# Patient Record
Sex: FEMALE
[De-identification: names, ages, dates, MRNs, and addresses within clinical notes are randomized per-mention and may not be internally consistent; named-entity substitution may affect disease eponyms.]

---

## 2018-07-06 ENCOUNTER — HOSPITAL ENCOUNTER (INPATIENT)
Dept: HOSPITAL 42 - ED | Age: 58
LOS: 3 days | Discharge: HOME | DRG: 127 | End: 2018-07-09
Attending: INTERNAL MEDICINE | Admitting: FAMILY MEDICINE
Payer: MEDICAID

## 2018-07-06 VITALS — BODY MASS INDEX: 44.9 KG/M2

## 2018-07-06 DIAGNOSIS — I50.23: ICD-10-CM

## 2018-07-06 DIAGNOSIS — R13.10: ICD-10-CM

## 2018-07-06 DIAGNOSIS — Z86.73: ICD-10-CM

## 2018-07-06 DIAGNOSIS — E11.9: ICD-10-CM

## 2018-07-06 DIAGNOSIS — I11.0: Primary | ICD-10-CM

## 2018-07-06 DIAGNOSIS — I27.20: ICD-10-CM

## 2018-07-06 DIAGNOSIS — I42.0: ICD-10-CM

## 2018-07-06 DIAGNOSIS — E66.01: ICD-10-CM

## 2018-07-06 DIAGNOSIS — I08.3: ICD-10-CM

## 2018-07-06 DIAGNOSIS — E78.5: ICD-10-CM

## 2018-07-06 DIAGNOSIS — E03.9: ICD-10-CM

## 2018-07-06 DIAGNOSIS — Z87.891: ICD-10-CM

## 2018-07-06 DIAGNOSIS — Z79.82: ICD-10-CM

## 2018-07-06 LAB
ALBUMIN SERPL-MCNC: 4.6 G/DL
ALBUMIN/GLOB SERPL: 1.3 {RATIO}
ALT SERPL-CCNC: 27 U/L
APPEARANCE UR: CLEAR
APTT BLD: 31.1 SECONDS
AST SERPL-CCNC: 30 U/L
BACTERIA #/AREA URNS HPF: (no result) /[HPF]
BASOPHILS # BLD AUTO: 0.04 K/MM3
BASOPHILS NFR BLD: 0.3 %
BILIRUB UR-MCNC: NEGATIVE MG/DL
BNP SERPL-MCNC: 3940 PG/ML
BUN SERPL-MCNC: 15 MG/DL
CALCIUM SERPL-MCNC: 9.6 MG/DL
COLOR UR: YELLOW
D DIMER PPP FEU-MCNC: 1108 NG/ML
EOSINOPHIL # BLD: 0.1 10*3/UL
EOSINOPHIL NFR BLD: 0.5 %
ERYTHROCYTE [DISTWIDTH] IN BLOOD BY AUTOMATED COUNT: 13.9 %
GFR NON-AFRICAN AMERICAN: > 60
GLUCOSE UR STRIP-MCNC: NEGATIVE MG/DL
GRANULOCYTES # BLD: 10.5 10*3/UL
GRANULOCYTES NFR BLD: 78.4 %
HDLC SERPL-MCNC: 43 MG/DL
HGB BLD-MCNC: 15.2 G/DL
INR PPP: 1.06
LDLC SERPL-MCNC: 113 MG/DL
LEUKOCYTE ESTERASE UR-ACNC: NEGATIVE LEU/UL
LIPASE SERPL-CCNC: 60 U/L
LYMPHOCYTES # BLD: 2 10*3/UL
LYMPHOCYTES NFR BLD AUTO: 14.7 %
MCH RBC QN AUTO: 30.2 PG
MCHC RBC AUTO-ENTMCNC: 33.3 G/DL
MCV RBC AUTO: 90.7 FL
MONOCYTES # BLD AUTO: 0.8 10*3/UL
MONOCYTES NFR BLD: 6.1 %
PH UR STRIP: 6.5 [PH]
PLATELET # BLD: 249 10^3/UL
PMV BLD AUTO: 12.2 FL
PROT UR STRIP-MCNC: 100 MG/DL
PROTHROMBIN TIME: 12.1 SECONDS
RBC # BLD AUTO: 5.04 10^6/UL
RBC # UR STRIP: (no result) /UL
SP GR UR STRIP: 1.02
T3 SERPL-MCNC: 1.12 NG/ML
T4 FREE SERPL-MCNC: 1.21 NG/DL
TROPONIN I SERPL-MCNC: 0.03 NG/ML
UROBILINOGEN UR STRIP-ACNC: 0.2 E.U./DL
WBC # BLD AUTO: 13.4 10^3/UL
WBC #/AREA URNS HPF: (no result) /HPF

## 2018-07-06 RX ADMIN — INSULIN HUMAN SCH: 100 INJECTION, SOLUTION PARENTERAL at 22:00

## 2018-07-06 NOTE — RAD
HISTORY:

sob  



COMPARISON:

No prior. 



FINDINGS:



LUNGS:

Technically limited due to patient body habitus. No infiltrate.



PLEURA:

No significant pleural effusion identified, no pneumothorax apparent.



CARDIOVASCULAR:

Normal.



OSSEOUS STRUCTURES:

No significant abnormalities.



VISUALIZED UPPER ABDOMEN:

Normal.



OTHER FINDINGS:

None.



IMPRESSION:

No active disease.

## 2018-07-06 NOTE — CP.PCM.HP
<Chai Aguilar - Last Filed: 18 19:24>





History of Present Illness





- History of Present Illness


History of Present Illness: 





CC: Shortness of breath, chest pain





HPI: Patient is a 59 yo F with PMH of HTN, DM, hypothyroidism, chronic systolic 

CHF, dilated cardiomyopathy, and TIA presents to the ED due to shortness of 

breath and chest pain for the past day. Patient states that the chest pain 

started last night around 9 pm last night, which is located mid-sternal, 

pressure-like, constant, but does not radiate. Patient denies prior occurrences 

or any inciting activities that caused the chest pain. Patient also complains 

of increasing shortness of breath during this period. At baseline, patient is 

unable to walk one block without becoming short of breath and requires 2 

pillows at night to sleep. Patient is also complaining of a dry cough. Patient 

denies n/v/d, abdominal pain, fever, chills, HA, dizziness, dysuria, hematuria, 

or heat/cold intolerance.





PMD: Carter


Cardio: Clemente





PMH: HTN, DM, hypothyroidism, chronic systolic CHF, dilated cardiomyopathy, and 

TIA


Surg: C-sections x4, appendectomy, tonsillectomy


All: NKDA


SH: Former smoker (3 cig/day for 15 yrs), quit 6 years ago; Denied EtOH or 

illicit drug use


FHx: Mother, , h/o DM; Father, unknown





Medications:


Lasix 20 mg daily


Venlafaxine 37.5 daily


Ambien 5 mg HS


Valsartan 160 mg daily


Coreg 25 mg daily








Present on Admission





- Present on Admission


Any Indicators Present on Admission: No





Review of Systems





- Review of Systems


All systems: reviewed and no additional remarkable complaints except (12 point 

ROS reviewed and is negative other than what is stated in HPI.)





Past Patient History





- Infectious Disease


Hx of Infectious Diseases: None





- Tetanus Immunizations


Tetanus Immunization: Unknown





- Past Social History


Smoking Status: Former Smoker





- CARDIAC


Hx Cardiac Disorders: Yes


Hx Congestive Heart Failure: Yes


Hx Hypertension: Yes


Hx Peripheral Edema: Yes





- PULMONARY


Hx Respiratory Disorders: Yes


Other/Comment: CHF





- NEUROLOGICAL


Hx Neurological Disorder: No





- HEENT


Hx HEENT Problems: No





- RENAL


Hx Chronic Kidney Disease: No





- ENDOCRINE/METABOLIC


Hx Endocrine Disorders: Yes


Hx Diabetes Mellitus Type 2: Yes





- HEMATOLOGICAL/ONCOLOGICAL


Hx Blood Disorders: No





- INTEGUMENTARY


Hx Dermatological Problems: No





- MUSCULOSKELETAL/RHEUMATOLOGICAL


Hx Musculoskeletal Disorders: Yes


Hx Arthritis: Yes


Hx Falls: Yes





- GASTROINTESTINAL


Hx Gastrointestinal Disorders: No





- GENITOURINARY/GYNECOLOGICAL


Hx Genitourinary Disorders: No





- PSYCHIATRIC


Hx Psychophysiologic Disorder: Yes


Hx Anxiety: Yes


Hx Depression: Yes


Hx Substance Use: No





- SURGICAL HISTORY


Hx Appendectomy: Yes


Hx Cholecystectomy: Yes





Meds


Allergies/Adverse Reactions: 


 Allergies











Allergy/AdvReac Type Severity Reaction Status Date / Time


 


No Known Allergies Allergy   Verified 18 21:10














Physical Exam





- Constitutional


Appears: No Acute Distress





- Head Exam


Head Exam: ATRAUMATIC, NORMAL INSPECTION, NORMOCEPHALIC





- Eye Exam


Eye Exam: EOMI, Normal appearance, PERRL





- ENT Exam


ENT Exam: Mucous Membranes Moist, Normal Exam





- Neck Exam


Neck exam: Positive for: Normal Inspection





- Respiratory Exam


Respiratory Exam: Clear to Auscultation Bilateral.  absent: Decreased Breath 

Sounds, Rales, Rhonchi, Wheezes





- Cardiovascular Exam


Cardiovascular Exam: RRR, +S1, +S2.  absent: Bradycardia, Tachycardia, 

Diastolic murmur, Gallop, JVD, Rubs, Systolic Murmur


Additional comments: 





LE edema 3+/4





- GI/Abdominal Exam


GI & Abdominal Exam: Soft, Tenderness (epigastric).  absent: Distended, Firm, 

Guarding, Mass, Rebound





- Extremities Exam


Extremities exam: Positive for: normal capillary refill, pedal edema, pedal 

pulses present.  Negative for: joint swelling, tenderness





- Back Exam


Back exam: NORMAL INSPECTION





- Neurological Exam


Neurological exam: Alert, CN II-XII Intact, Oriented x3





- Psychiatric Exam


Psychiatric exam: Normal Affect, Normal Mood





- Skin


Skin Exam: Dry, Intact, Normal Color, Warm





Results





- Vital Signs


Recent Vital Signs: 





 Last Vital Signs











Temp  98.1 F   18 18:30


 


Pulse  85   18 18:30


 


Resp  18   18 18:30


 


BP  166/97 H  18 18:30


 


Pulse Ox  92 L  18 18:30














- Labs


Result Diagrams: 


 18 16:08





 18 16:08





Assessment & Plan





- Assessment and Plan (Free Text)


Assessment: 





59 yo F with PMH of HTN, DM, hypothyroidism, dilated cardiomyopathy, and TIA 

presents to [facility] [place] for chest pain and shortness of breath. Admitted for evaluation 

and treatment for acute systolic CHF exacerbation.





Plan: 





1. Acute systolic CHF exacerbation


- BNP 3940


- Pt currently refusing ABG


- Echo () showed EF of 27.9%, 4 chamber dilation, mild-mod MR/TR/AR


- Echo ordered


- Head of bed 30 degrees


- Strict I's and O's


- Daily weights


- Lasix 40 mg IVP BID


- Coreg 25 mg PO BID


- Losartan 100 mg PO daily


- Cardio consulted





2. Chest pain r/o ACS


- EKG showed sinus tachycardia at rate of 105, LVH, no acute ST-T wave changes


- CXR showed no active disease


- Troponin 0.03, will trend x2


- AM EKG ordered


- TSH, A1c, Lipid profile ordered


- ASA 81 mg po daily


- Cardio consulted





3. Elevated D-Dimer


- D-Dimer 1108


- Wells Criteria 1.5 points, low risk for PE


- Patient refusing CTA at this time





3. LE edema/swelling


- Likely 2/2 to CHF


- B/L LE venous duplex ordered to r/o DVT





4. HTN


- Cont Losartan and Coreg





5. DM


- Hold Metoformin


- ISS-low


- Accuchecks ACHS





6. H/o Hypothyroidism


- Thyroid studies ordered





GI/DVT PPx


- Protonix


- Heparin





Pt seen and discussed in detail with Dr. Bennett.


Timo Aguilar, PGY2








<Stephanie Bennett - Last Filed: 18 16:25>





Results





- Vital Signs


Recent Vital Signs: 





 Last Vital Signs











Temp  97.7 F   18 12:00


 


Pulse  68   18 14:00


 


Resp  18   18 12:00


 


BP  140/92 H  18 12:00


 


Pulse Ox  95   18 09:00














- Labs


Result Diagrams: 


 18 04:20





 18 04:20


Labs: 





 Laboratory Results - last 24 hr











  18





  18:00 18:00 21:38


 


WBC   


 


RBC   


 


Hgb   


 


Hct   


 


MCV   


 


MCH   


 


MCHC   


 


RDW   


 


Plt Count   


 


MPV   


 


Sodium   


 


Potassium   


 


Chloride   


 


Carbon Dioxide   


 


Anion Gap   


 


BUN   


 


Creatinine   


 


Est GFR ( Amer)   


 


Est GFR (Non-Af Amer)   


 


POC Glucose (mg/dL)   


 


Random Glucose   


 


Calcium   


 


Phosphorus   


 


Magnesium   


 


Total Bilirubin   


 


AST   


 


ALT   


 


Alkaline Phosphatase   


 


Troponin I    0.05  D


 


Total Protein   


 


Albumin   


 


Globulin   


 


Albumin/Globulin Ratio   


 


Triglycerides  150  


 


Cholesterol  182  


 


LDL Cholesterol Direct  113  


 


HDL Cholesterol  43  


 


Free T4   1.21 


 


Total T3   1.12 


 


TSH 3rd Generation   0.24 L 














  18





  21:51 04:20 04:20


 


WBC    9.2  D


 


RBC    4.92


 


Hgb    14.7


 


Hct    45.7


 


MCV    92.9


 


MCH    29.9


 


MCHC    32.2


 


RDW    14.4


 


Plt Count    206


 


MPV    12.1 H


 


Sodium   143 


 


Potassium   3.7 


 


Chloride   98 


 


Carbon Dioxide   37 H 


 


Anion Gap   12 


 


BUN   16 


 


Creatinine   0.7 


 


Est GFR ( Amer)   > 60 


 


Est GFR (Non-Af Amer)   > 60 


 


POC Glucose (mg/dL)  172 H  


 


Random Glucose   121 H 


 


Calcium   9.4 


 


Phosphorus   4.8 H 


 


Magnesium   2.0 


 


Total Bilirubin   0.9 


 


AST   24 


 


ALT   33 


 


Alkaline Phosphatase   60 


 


Troponin I   0.05 


 


Total Protein   7.5 


 


Albumin   4.1 


 


Globulin   3.4 


 


Albumin/Globulin Ratio   1.2 


 


Triglycerides   


 


Cholesterol   


 


LDL Cholesterol Direct   


 


HDL Cholesterol   


 


Free T4   


 


Total T3   


 


TSH 3rd Generation   














  18





  07:31 11:27


 


WBC  


 


RBC  


 


Hgb  


 


Hct  


 


MCV  


 


MCH  


 


MCHC  


 


RDW  


 


Plt Count  


 


MPV  


 


Sodium  


 


Potassium  


 


Chloride  


 


Carbon Dioxide  


 


Anion Gap  


 


BUN  


 


Creatinine  


 


Est GFR ( Amer)  


 


Est GFR (Non-Af Amer)  


 


POC Glucose (mg/dL)  112 H  128 H


 


Random Glucose  


 


Calcium  


 


Phosphorus  


 


Magnesium  


 


Total Bilirubin  


 


AST  


 


ALT  


 


Alkaline Phosphatase  


 


Troponin I  


 


Total Protein  


 


Albumin  


 


Globulin  


 


Albumin/Globulin Ratio  


 


Triglycerides  


 


Cholesterol  


 


LDL Cholesterol Direct  


 


HDL Cholesterol  


 


Free T4  


 


Total T3  


 


TSH 3rd Generation  














Attending/Attestation





- Attestation


I have personally seen and examined this patient.: Yes


I have fully participated in the care of the patient.: Yes


I have reviewed all pertinent clinical information: Yes


Notes (Text): 


Patient seen and examined by me at 6:00PM 18 with resident at bedside. Case 

including physical assessment and plan discussed with resident. Agree with 

above with following additions/changes.


Patient is a 58-year-old female with past medical history significant for 

essential hypertension, type 2 diabetes, hypothyroidism (patient is not taking 

any medications), chronic systolic CHF, dilated cardiomyopathy, and TIA the 

presents to emergency room with chest pain and shortness of breath. She states 

that she is feeling short of breath for approximately one month. She states 

that she is short of breath at rest and with exertion. She is only able to 

ambulate a short distance. She sleeps on 2 pillows and is unable to lie flat. 

Patient does not use home oxygen. She states that she started to have pressure-

like midsternal chest pain last night. She states the pain is intermittent and 

does not radiate. She denies associated diaphoresis or nausea. No vomiting. 

Patient feels much better in the emergency room after receiving Lasix. She 

states that the oxygen via nasal cannula is also helping. No headaches or 

dizziness. No nausea, vomiting, or abdominal pain. No fevers or chills. No 

dysuria. No diarrhea or constipation.


14 point review of systems reviewed by me. Please see HPI. All other systems 

are negative. Home medications reviewed with patient by me.


Physical exam:


Gen: Patient is awake and alert sitting up in bed in no acute distress


HEENT: Normocephalic atraumatic, extraocular muscles intact, pupils equal 

reactive, oropharynx is pink and moist, no pharyngeal erythema or exudate 

appreciated,  neck is supple. Hearing grossly intact. Ears and nose externally 

unremarkable.


Cardiovascular: Normal rhythm, normal S1-S2, no murmurs rubs or gallops 

appreciated


Pulmonary: Normal respiratory effort. Decreased breath sounds. Scattered 

crackles. No rhonchi or wheezing appreciated


Gastrointestinal: Soft, obese abdomen, positive mild epigastric tenderness, 

nondistended, positive bowel sounds all 4 quadrants, no guarding


Musculoskeletal: Moves all extremities, no calf tenderness, no CVA tenderness


Central nervous system: AAO 3


Dermatologic: Skin warm and dry


Assessment and plan: Patient is a 58-year-old female that presented to 

emergency room with chest pain and shortness of breath. She found to have acute 

on current systolic CHF exacerbation and elevated d-dimer.


1. Acute on chronic systolic CHF exacerbation. 2-D echo in  showed an EF of 

27.9%. BNP elevated Repeat 2-D echo. Consult cardiology, follow-up 

recommendations. Strict I's and O's. Monitor daily weights. Place on Lasix 40 

mg IV twice a day. Continue home spironolactone, Coreg and losartan. O2 via 

nasal cannula as needed. Monitor on telemetry.


2. Chest pain. Rule out acute coronary syndrome. Follow up 2-D echo. Heart 

aortic consult a follow-up recommendations. Continue aspirin and Coreg. Follow-

up TSH, hemoglobin A1c, and lipid panel. Monitor serial troponins. Monitor on 

telemetry


3. Elevated d-dimer. Patient is refusing CTA. Importance of having this test 

done discussed at length with patient. Patient still refusing. We'll check 

lower extremity Dopplers to rule out DVT.


4. Bilateral lower extremity edema. Likely secondary to CHF. Placed on Lasix 40 

mg IV twice a day. Follow-up lower extremity Dopplers to rule out DVT.


5. Essential hypertension. Continue Coreg and losartan.


6. Type 2 diabetes, non-insulin-dependent. Place on insulin sliding scale. 

Diabetic Diet. Monitor Accu-Cheks. Patient counseled on diet and exercise. 

Follow-up hemoglobin A1c


7. History of hypothyroidism. Patient states that she stopped her medication 

because she did not want to take too many medications. Importance of compliance 

of medications discussed in detail with patient. Check TSH, free T4, and T3.


8. Leukocytosis. Likely reactive. No signs of infection. No indication for 

antibiotics. Follow up repeat labs in a.m.


9. GI and DVT prophylaxis. Protonix and heparin 5000 units subcutaneous every 8 

hours


10. Patient is a full code


Case was discussed in detail with the patient and medical resident regarding 

current diagnosis and treatment plan

## 2018-07-06 NOTE — ED PDOC
Arrival/HPI





- General


Chief Complaint: Abdominal Pain


Time Seen by Provider: 07/06/18 15:20


Historian: Patient, EMS





- Critical Care


Critical Care Minutes: 30 minutes





- History of Present Illness


Time/Duration: Other (last night)


Symptom Onset: Gradual


Symptom Course: Worsening


Quality: Pressure, Tightness


Severity Level: Severe


Activities at Onset: Rest


Associated Symptoms (Text): 





07/06/18 15:35


Patient complains of severe worsening shortness of breath since last night 

accompanied by pressure-like epigastric abdominal pain. Patient believes this 

is related to a hernia. No diaphoresis. No dizziness or lightheadedness. No 

nausea or vomiting. She is in severe distress. She is tachypneic and 

tachycardic with accessory muscle use and retractions. Morbidly obese.





Past Medical History





- Infectious Disease


Hx of Infectious Diseases: None





- Tetanus Immunization


Tetanus Immunization: Unknown





- Cardiac


Hx Cardiac Disorders: Yes


Hx Congestive Heart Failure: Yes


Hx Hypertension: Yes


Hx Peripheral Edema: Yes





- Pulmonary


Hx Respiratory Disorders: Yes


Other/Comment: CHF





- Neurological


Hx Neurological Disorder: No





- HEENT


Hx HEENT Disorder: No





- Renal


Hx Renal Disorder: No





- Endocrine/Metabolic


Hx Endocrine Disorders: Yes


Hx Diabetes Mellitus Type 2: Yes





- Hematological/Oncological


Hx Blood Disorders: No





- Integumentary


Hx Dermatological Disorder: No





- Musculoskeletal/Rheumatological


Hx Musculoskeletal Disorders: Yes


Hx Arthritis: Yes


Hx Falls: Yes





- Gastrointestinal


Hx Gastrointestinal Disorders: No





- Genitourinary/Gynecological


Hx Genitourinary Disorders: No





- Psychiatric


Hx Psychophysiologic Disorder: Yes


Hx Anxiety: Yes


Hx Depression: Yes


Hx Substance Use: No





- Surgical History


Hx Appendectomy: Yes


Hx Cholecystectomy: Yes





- Suicidal Assessment


Feels Threatened In Home Enviroment: No





Family/Social History





- Physician Review


Nursing Documentation Reviewed: Yes


Family/Social History: Unknown Family HX


Smoking Status: Former Smoker


Hx Alcohol Use: No


Hx Substance Use: No





Allergies/Home Meds


Allergies/Adverse Reactions: 


Allergies





No Known Allergies Allergy (Verified 07/06/18 15:07)


 








Home Medications: 


 Home Meds











 Medication  Instructions  Recorded  Confirmed


 


Aspirin [Aspir 81] 81 mg PO DAILY 08/06/13 07/06/18


 


Carvedilol [Coreg] 25 mg PO BID 08/06/13 07/06/18


 


Potassium [Potassimin] 20 mg PO DAILY 08/06/13 07/06/18


 


Spironolactone 25 mg PO BID 08/06/13 07/06/18














Review of Systems





- Review of Systems


Systems not reviewed;Unavailable: Respiratory Distress





Physical Exam


Vital Signs











  Temp Pulse Resp BP Pulse Ox


 


 07/06/18 17:45   87  18  172/96 H  95


 


 07/06/18 17:17   92 H   155/94 H 


 


 07/06/18 16:50    18  173/108 H 


 


 07/06/18 15:53     193/125 H 


 


 07/06/18 15:09  98.6 F  103 H  23  175/126 H  90 L











Temperature: Afebrile


Blood Pressure: Hypertensive


Pulse: Tachycardic


Respiratory Rate: Tachypneic


Appearance: Positive for: Non-Toxic, Ill-Appearing, Uncomfortable, Other (obese)


Pain Distress: Mild


Mental Status: Positive for: Alert and Oriented X 3





- Systems Exam


Head: Present: Atraumatic, Normocephalic


Pupils: Present: PERRL


Extroacular Muscles: Present: EOMI


Conjunctiva: Present: Normal


Mouth: Present: Moist Mucous Membranes


Pharnyx: No: ERYTHEMA, EXUDATE, TONSILS ENLARGED


Respiratory/Chest: Present: Respiratory Distress, Accessory Muscle Use, 

Decreased Breath Sounds, Rales, Retracting, Rhonchi, Tachypneic


Cardiovascular: Present: Regular Rate and Rhythm, Normal S1, S2, Tachycardic.  

No: Murmurs


Abdomen: No: Tenderness, Distention, Peritoneal Signs, Rebound, Guarding


Upper Extremity: Present: Normal Inspection.  No: Cyanosis, Edema


Lower Extremity: Present: Normal Inspection.  No: Edema


Neurological: Present: GCS=15, CN II-XII Intact, Speech Normal, Motor Func 

Grossly Intact


Skin: Present: Warm, Dry, Normal Color.  No: Rashes


Psychiatric: Present: Alert, Oriented x 3, Normal Insight, Normal Concentration





Medical Decision Making


ED Course and Treatment: 





07/06/18 15:37


EKG showed sinus tachycardia rate approximately 105 with LVH and no acute ST or 

T-wave changes


07/06/18 15:47


Patient adamantly refuses ABG.


07/06/18 18:00


Patient adamantly refuses CT chest. She states she has had before and she is 

afraid. I discussed in detail with the patient that if she had a pulmonary 

embolus she could potentially die from this problem. She does not care and 

still refuses the CT scan. I explained a VQ scan to her and she also refuses to 

have a VQ scan done. She is awake alert and able to make such a decision.





- Lab Interpretations


Lab Results: 








 07/06/18 16:08 





 07/06/18 16:08 





 Lab Results





07/06/18 16:15: Urine Color Yellow, Urine Appearance Clear, Urine pH 6.5, Ur 

Specific Gravity 1.020, Urine Protein 100 H, Urine Glucose (UA) Negative, Urine 

Ketones Negative, Urine Blood Trace-intact H, Urine Nitrate Negative, Urine 

Bilirubin Negative, Urine Urobilinogen 0.2, Ur Leukocyte Esterase Negative, 

Urine RBC 1 - 3, Urine WBC 0 - 2, Ur Epithelial Cells 3 - 4, Urine Bacteria Few


07/06/18 16:08: Sodium 142, Potassium 4.3, Chloride 98, Carbon Dioxide 31, 

Anion Gap 16, BUN 15, Creatinine 0.7, Est GFR (African Amer) > 60, Est GFR (Non-

Af Amer) > 60, Random Glucose 120 H, Calcium 9.6, Magnesium 2.1, Total 

Bilirubin 0.8, AST 30, ALT 27, Alkaline Phosphatase 71, Lactate Dehydrogenase 

592, Total Creatine Kinase 52, Troponin I 0.03, NT-Pro-B Natriuret Pep 3940 H, 

Total Protein 8.2, Albumin 4.6, Globulin 3.6, Albumin/Globulin Ratio 1.3, 

Lipase 60


07/06/18 16:08: PT 12.1, INR 1.06, APTT 31.1, D-Dimer, Quantitative 1108 H


07/06/18 16:08: WBC 13.4 H, RBC 5.04, Hgb 15.2, Hct 45.7, MCV 90.7, MCH 30.2, 

MCHC 33.3, RDW 13.9, Plt Count 249, MPV 12.2 H, Gran % 78.4 H, Lymph % (Auto) 

14.7 L, Mono % (Auto) 6.1 H, Eos % (Auto) 0.5 L, Baso % (Auto) 0.3, Gran # 

10.50 H, Lymph # (Auto) 2.0, Mono # (Auto) 0.8 H, Eos # (Auto) 0.1, Baso # (Auto

) 0.04











- RAD Interpretation


Radiology Orders: 








07/06/18 15:29


CHEST PORTABLE [RAD] Stat 








Chest one view shows cardiomegaly and CHF.


: ED Physician





- Medication Orders


Current Medication Orders: 











Discontinued Medications





Albuterol/Ipratropium (Duoneb 3 Mg/0.5 Mg (3 Ml) Ud)  3 ml IH STAT STA


   Stop: 07/06/18 15:30


   Last Admin: 07/06/18 15:43  Dose: 3 ml





Aspirin (Aspirin Chewable)  324 mg PO ONCE ONE


   Stop: 07/06/18 15:34


   Last Admin: 07/06/18 15:43  Dose:  





Furosemide (Lasix)  60 mg IVP STAT STA


   Stop: 07/06/18 15:30


   Last Admin: 07/06/18 15:53  Dose: 60 mg





MAR Blood Pressure


 Document     07/06/18 15:53  SF  (Rec: 07/06/18 15:53  SF  [id number])


     Blood Pressure


      Blood Pressure (100//90 mm Hg)       193/125


IVP Administration


 Document     07/06/18 15:53  SF  (Rec: 07/06/18 15:53  SF  [id number])


     Charges for Administration


      # of IVP Administrations                   1





Metoprolol Tartrate (Lopressor)  5 mg IVP STAT STA


   Stop: 07/06/18 16:50


   Last Admin: 07/06/18 17:17  Dose: 5 mg





IVP Administration


 Document     07/06/18 17:17  [facility] [place] 07/06/18 17:17  [id number]


     Charges for Administration


      # of IVP Administrations                   1


MAR Pulse and Blood Pressure


 Document     07/06/18 17:17  LM  (Rec: 07/06/18 17:17  [id number]


     Pulse


      Pulse Rate (60-90 beats/min)               92


     Blood Pressure


      Blood Pressure (100//90 mm Hg)       155/94





Nitroglycerin (Nitro-Bid 2% Oint)  1 ea TOP STAT STA


   Stop: 07/06/18 15:34


   Last Admin: 07/06/18 15:44  Dose: 1 ea











Disposition/Present on Arrival





- Present on Arrival


Any Indicators Present on Arrival: No


History of DVT/PE: No


History of Uncontrolled Diabetes: No


Urinary Catheter: No


History of Decub. Ulcer: No


History Surgical Site Infection Following: None





- Disposition


Have Diagnosis and Disposition been Completed?: Yes


Diagnosis: 


 Congestive heart failure, Hypertension, Chest pain, Elevated troponin, Acute 

dyspnea, Hypoxia, Tachycardia, Leukocytosis, Obesity





Disposition: HOSPITALIZED


Disposition Time: 16:55


Patient Plan: Admission, Telemetry


Patient Problems: 


 Current Active Problems











Problem Status Onset


 


Acute dyspnea Acute  


 


Chest pain Acute  


 


Congestive heart failure Acute  


 


Elevated troponin Acute  


 


Hypertension Acute  


 


Hypoxia Acute  


 


Leukocytosis Acute  


 


Obesity Acute  


 


Tachycardia Acute  











Condition: SERIOUS

## 2018-07-07 LAB
ALBUMIN SERPL-MCNC: 4.1 G/DL
ALBUMIN/GLOB SERPL: 1.2 {RATIO}
ALT SERPL-CCNC: 33 U/L
AST SERPL-CCNC: 24 U/L
BUN SERPL-MCNC: 16 MG/DL
CALCIUM SERPL-MCNC: 9.4 MG/DL
ERYTHROCYTE [DISTWIDTH] IN BLOOD BY AUTOMATED COUNT: 14.4 %
GFR NON-AFRICAN AMERICAN: > 60
HGB BLD-MCNC: 14.7 G/DL
MCH RBC QN AUTO: 29.9 PG
MCHC RBC AUTO-ENTMCNC: 32.2 G/DL
MCV RBC AUTO: 92.9 FL
PLATELET # BLD: 206 10^3/UL
PMV BLD AUTO: 12.1 FL
RBC # BLD AUTO: 4.92 10^6/UL
TROPONIN I SERPL-MCNC: 0.05 NG/ML
WBC # BLD AUTO: 9.2 10^3/UL

## 2018-07-07 RX ADMIN — INSULIN HUMAN SCH: 100 INJECTION, SOLUTION PARENTERAL at 11:43

## 2018-07-07 RX ADMIN — INSULIN HUMAN SCH: 100 INJECTION, SOLUTION PARENTERAL at 17:13

## 2018-07-07 RX ADMIN — INSULIN HUMAN SCH: 100 INJECTION, SOLUTION PARENTERAL at 07:34

## 2018-07-07 RX ADMIN — PANTOPRAZOLE SODIUM SCH MG: 40 TABLET, DELAYED RELEASE ORAL at 05:14

## 2018-07-07 RX ADMIN — INSULIN HUMAN SCH: 100 INJECTION, SOLUTION PARENTERAL at 21:49

## 2018-07-07 NOTE — CARD
--------------- APPROVED REPORT --------------





EKG Measurement

Heart [id number]

NE [id number]

[id number]

[id number]

[id number]



<Conclusion>

Sinus tachycardia

Biatrial enlargement

Left axis deviation

PRWP

LVH

STTW changes c/w ischemia

No change

## 2018-07-07 NOTE — CON
DATE:  07/07/2018



CARDIOLOGY CONSULTATION



REASON FOR CONSULTATION:  Exacerbation of congestive heart failure.



HISTORY OF PRESENT ILLNESS:  The patient is a 58-year-old  female

who presented because of shortness of breath and bilateral leg swelling. 

The patient is being followed by a cardiologist at [facility], who advised her cardiac catheterization, but she refused.  The

patient is unaware of any history of heart attack in the past.



SOCIAL HISTORY:  The patient is a former smoker.



MEDICATIONS:  Coreg 25 mg once a day, Cozaar 100 mg once a day, aspirin 81

mg once a day, heparin 5000 units subcutaneous every 8 hours, Lasix 40 mg

intravenous twice a day, Protonix 40 mg p.o. once a day.



REVIEW OF SYSTEMS:  No fever or chills.  The patient complained of

productive cough.



PHYSICAL EXAMINATION:

GENERAL:  The patient is a middle-aged female who is mildly tachypneic, but

does not appear to be in acute distress.

VITAL SIGNS:  Blood pressure 141/81, heart rate 66, respirations 18,

temperature 98.

HEENT:  Normocephalic.

CHEST:  Bibasilar rales.

HEART:  S1 and S2 regular.

ABDOMEN:  Mild ascites.

EXTREMITIES:  2+ pitting edema.



DIAGNOSTIC STUDIES:  EKG revealed sinus tachycardia at rate 104, biatrial

enlargement, left axis deviation, poor R wave progression, ST-T wave

changes consistent with ischemia.



Chest x-ray revealed cardiomegaly, right lower lobe infiltrate. 

Preliminary review of the echocardiographic study revealed severely

depressed ejection fraction which was measured at 20%.



LABORATORY DATA:  Today's hemoglobin and hematocrit, white count and

platelet count are within normal limit.



Today's SMA-7 is within normal limits except for glucose of 121 and carbon

dioxide of 37.



Three sets of troponins are 0.03, 0.05, and 0.05.



ProBNP is 3940.



Lipid profile is within normal limit.



TSH level is 0.24.



ASSESSMENT:

1.  Exacerbation of congestive heart failure.

2.  Consider right lower lobe pneumonia.

3.  Hypertension.

4.  Morbid obesity.



RECOMMENDATIONS:  Continue IV Lasix at 40 mg twice a day, subcutaneous

heparin 5000 units every 8 hours, Cozaar 100 mg once a day, Coreg 25 mg

twice a day.  I will start Aldactone at 25 mg orally once a day.  The

patient refuses the idea of cardiac catheterization.







__________________________________________

Carlos Friedman MD



DD:  07/07/2018 12:15:32

DT:  07/07/2018 12:16:56

[facility] # [id number]

## 2018-07-07 NOTE — CP.PCM.PN
<Deysi Dia L - Last Filed: 07/07/18 19:53>





Subjective





- Date & Time of Evaluation


Date of Evaluation: 07/07/18


Time of Evaluation: 07:00





- Subjective


Subjective: 





No acute events overnight. States that her chest pain and shortness of breath 

has improved. Denies abdominal pain, diarrhea/constipation, urinary urgency. 

Patient also expressed concern about CTA. States she cannot lie flat as this 

exacerbates her shortness of breath. 





Objective





- Vital Signs/Intake and Output


Vital Signs (last 24 hours): 


 











Temp Pulse Resp BP Pulse Ox


 


 97.7 F   68   18   140/92 H  95 


 


 07/07/18 12:00  07/07/18 14:00  07/07/18 12:00  07/07/18 12:00  07/07/18 09:00








Intake and Output: 


 











 07/07/18 07/07/18





 06:59 18:59


 


Intake Total 360 


 


Balance 360 














- Medications


Medications: 


 Current Medications





Aspirin (Ecotrin)  81 mg PO DAILY [facility]


   Last Admin: 07/07/18 09:24 Dose:  81 mg


Carvedilol (Coreg)  25 mg PO BID [facility]


   Last Admin: 07/07/18 09:24 Dose:  25 mg


Furosemide (Lasix)  40 mg IVP BID [facility]


   Last Admin: 07/07/18 09:23 Dose:  40 mg


Heparin Sodium (Porcine) (Heparin)  5,000 units SC Q8 [facility]


   PRN Reason: Protocol


   Last Admin: 07/07/18 14:54 Dose:  5,000 units


Insulin Human Regular (Humulin R Low)  0 units SC ACHS [facility]


   PRN Reason: Protocol


   Last Admin: 07/07/18 11:43 Dose:  Not Given


Losartan Potassium (Cozaar)  100 mg PO DAILY [facility]


   Last Admin: 07/07/18 09:24 Dose:  100 mg


Pantoprazole Sodium (Protonix Ec Tab)  40 mg PO 0600 [facility]


   Last Admin: 07/07/18 05:14 Dose:  40 mg


Spironolactone (Aldactone)  25 mg PO DAILY [facility]











- Labs


Labs: 


 





 07/07/18 04:20 





 07/07/18 04:20 





 











PT  12.1 SECONDS (9.4-12.5)   07/06/18  16:08    


 


INR  1.06  (0.93-1.08)   07/06/18  16:08    


 


APTT  31.1 Seconds (25.1-36.5)   07/06/18  16:08    














- Additional Findings


Additional findings: 








- Constitutional


Appears: No Acute Distress





- Head Exam


Head Exam: ATRAUMATIC, NORMAL INSPECTION, NORMOCEPHALIC





- Eye Exam


Eye Exam: EOMI, Normal appearance, PERRL





- ENT Exam


ENT Exam: Mucous Membranes Moist, Normal Exam





- Neck Exam


Neck exam: Positive for: Normal Inspection





- Respiratory Exam


Respiratory Exam: Clear to Auscultation Bilateral.  absent: Decreased Breath 

Sounds, Rales, Rhonchi, Wheezes





- Cardiovascular Exam


Cardiovascular Exam: RRR, +S1, +S2.  absent: Bradycardia, Tachycardia, 

Diastolic murmur, Gallop, JVD, Rubs, Systolic Murmur


Additional comments: 





LE edema 1+/4





- GI/Abdominal Exam


GI & Abdominal Exam: Soft, Tenderness (epigastric).  absent: Distended, Firm, 

Guarding, Mass, Rebound





- Extremities Exam


Extremities exam: Positive for: normal capillary refill, pedal edema, pedal 

pulses present.  Negative for: joint swelling, tenderness





- Back Exam


Back exam: NORMAL INSPECTION





- Neurological Exam


Neurological exam: Alert, CN II-XII Intact, Oriented x3





- Psychiatric Exam


Psychiatric exam: Normal Affect, Normal Mood





- Skin


Skin Exam: Dry, Intact, Normal Color, Warm





Assessment and Plan





- Assessment and Plan (Free Text)


Assessment: 





Acute  on chronic systolic CHF exacerbation


- BNP 3940


- Echo (2013) showed EF of 27.9%, 4 chamber dilation, mild-mod MR/TR/AR


- Echo results pending


- Head of bed 30 degrees


- Strict I's and O's


- Daily weights


- Lasix 40 mg IVP BID


- Coreg 25 mg PO BID


- Losartan 100 mg PO daily


- Cardio consulted


- Patient improving clinically 





Chest pain r/o ACS


- Resolving


- EKG showed sinus tachycardia at rate of 105, LVH, no acute ST-T wave changes


- CXR showed no active disease


- Troponin 0.05 x 2 


- TSH low, T3/T4 normal 


- A1c ordered 


- Lipid profile normal 


- Continue ASA 81 mg po daily


- Cardio consulted





Elevated D-Dimer


- D-Dimer 1108


- Wells Criteria 1.5 points, low risk for PE


- Spoke to patient regarding importance of CTA. Still refusing at this time. 

Will attempt V/Q scan. 





LE edema/swelling


- Resolving


- Likely 2/2 to CHF


- B/L LE venous duplex ordered to r/o DVT





HTN


- Continue Losartan and Coreg





DM


- POC glucose 112 this morning 


- Hold Metformin


- ISS-low


- Accuchecks ACHS





Hypothyroidism


- TSH low, T3/T4 normal 





GI/DVT PPx


- Protonix


- Heparin





Patient seen and discussed with Dr. Donald Dia PGY 1 





<Stephanie Bennett R - Last Filed: 07/07/18 21:07>





Objective





- Vital Signs/Intake and Output


Vital Signs (last 24 hours): 


 











Temp Pulse Resp BP Pulse Ox


 


 98.2 F   73   20   141/90   95 


 


 07/07/18 17:31  07/07/18 18:00  07/07/18 17:31  07/07/18 17:43  07/07/18 09:00








Intake and Output: 


 











 07/07/18 07/08/18





 18:59 06:59


 


Intake Total 1180 


 


Balance 1180 














- Medications


Medications: 


 Current Medications





Aspirin (Ecotrin)  81 mg PO DAILY [facility]


   Last Admin: 07/07/18 09:24 Dose:  81 mg


Carvedilol (Coreg)  25 mg PO BID [facility]


   Last Admin: 07/07/18 17:43 Dose:  25 mg


Furosemide (Lasix)  40 mg IVP BID [facility]


   Last Admin: 07/07/18 17:43 Dose:  40 mg


Heparin Sodium (Porcine) (Heparin)  5,000 units SC Q8 [facility]


   PRN Reason: Protocol


   Last Admin: 07/07/18 14:54 Dose:  5,000 units


Insulin Human Regular (Humulin R Low)  0 units SC ACHS [facility]


   PRN Reason: Protocol


   Last Admin: 07/07/18 17:13 Dose:  Not Given


Losartan Potassium (Cozaar)  100 mg PO DAILY [facility]


   Last Admin: 07/07/18 09:24 Dose:  100 mg


Pantoprazole Sodium (Protonix Ec Tab)  40 mg PO 0600 [facility]


   Last Admin: 07/07/18 05:14 Dose:  40 mg


Spironolactone (Aldactone)  25 mg PO DAILY [facility]











- Labs


Labs: 


 





 07/07/18 04:20 





 07/07/18 04:20 





 











PT  12.1 SECONDS (9.4-12.5)   07/06/18  16:08    


 


INR  1.06  (0.93-1.08)   07/06/18  16:08    


 


APTT  31.1 Seconds (25.1-36.5)   07/06/18  16:08    














Attending/Attestation





- Attestation


I have personally seen and examined this patient.: Yes


I have fully participated in the care of the patient.: Yes


I have reviewed all pertinent clinical information, including history, physical 

exam and plan: Yes


Notes (Text): 


Patient seen and examined by me at 11:15AM with resident at bedside. Case 

including physical assessment and plan discussed with resident. Agree with 

above with following additions/changes.


Patient states she is feeling better today. Shortness of breath improved. 

Patient states she feels like "the liquid has come off of me." Patient feels 

less swollen. Chest pain has resolved. Patient continues to refuse CTA of 

chest. Importance discussed again. Patient still refusing. No headaches or 

dizziness. No nausea, vomiting, or abdominal pain. No fevers or chills. No 

dysuria. No diarrhea or constipation.


Physical exam:


Gen: Patient is awake and alert sitting up in bed in no acute distress


HEENT: Normocephalic atraumatic, extraocular muscles intact, pupils equal 

reactive, oropharynx is pink and moist, no pharyngeal erythema or exudate 

appreciated,  neck is supple. 


Cardiovascular: Normal rhythm, normal S1-S2, no murmurs rubs or gallops 

appreciated, positive lower extremity pitting edema. 


Pulmonary: Normal respiratory effort. Decreased breath sounds. No rhonchi, rales

, or wheezing appreciated


Gastrointestinal: Soft, obese abdomen, nontender, nondistended, positive bowel 

sounds all 4 quadrants, no guarding


Musculoskeletal: Moves all extremities, no calf tenderness,


Central nervous system: AAO 3


Dermatologic: Skin warm and dry


Assessment and plan: Patient is a 58-year-old female that presented to 

emergency room with chest pain and shortness of breath. She found to have acute 

on current systolic CHF exacerbation and elevated d-dimer.


1. Acute on chronic systolic CHF exacerbation. Improving. Pending 2-D echo 

results. Cardiology consulted, recommendations appreciated. Patient refusing 

cardiac cath. Continue strict I's and O's. Continue to monitor daily weights. 

Continue Lasix 40 mg IV twice a day. Continue  spironolactone, Coreg and 

losartan. O2 via nasal cannula as needed. Continue to monitor on telemetry


2. Chest pain. Rule out acute coronary syndrome. 2d echo results pending. 

Troponins within normal limits. Cardiology following, recommendations 

appreciated. Patient refusing cardiac cath. Continue aspirin and Coreg. TSH 

within normal limits. hemoglobin A1c pending. Lipid panel within normal limits. 

Continue to monitor on telemetry


3. Elevated d-dimer. Patient continues to refuse CTA. Importance of having this 

test done discussed again at length with patient. Patient still refusing. 

Pending lower extremity Dopplers results to rule out DVT.


4. Bilateral lower extremity edema. Likely secondary to CHF. Improving. 

Continue Lasix 40 mg IV twice a day. Follow-up lower extremity Doppler results.


5. Essential hypertension. Continue Coreg and losartan.


6. Type 2 diabetes, non-insulin-dependent. Continue insulin sliding scale. 

Continue diabetic diet. Cotinue to monitor Accu-Cheks. Patient counseled on 

diet and exercise. Follow-up hemoglobin A1c


7. History of hypothyroidism. Patient states that she stopped her medication 

because she did not want to take too many medications. Importance of compliance 

of medications discussed in detail with patient. Check TSH and Free T4 within 

normal limits. 


8. Leukocytosis. Resolved. Likely reactive. 


9. GI and DVT prophylaxis. Protonix and heparin 5000 units subcutaneous every 8 

hours


10. Patient is a full code


Case was discussed in detail with the patient and medical resident regarding 

current diagnosis and treatment plan

## 2018-07-08 LAB
ALBUMIN SERPL-MCNC: 4 G/DL
ALBUMIN/GLOB SERPL: 1.2 {RATIO}
ALT SERPL-CCNC: 33 U/L
AST SERPL-CCNC: 29 U/L
BUN SERPL-MCNC: 21 MG/DL
CALCIUM SERPL-MCNC: 8.7 MG/DL
ERYTHROCYTE [DISTWIDTH] IN BLOOD BY AUTOMATED COUNT: 13.8 %
GFR NON-AFRICAN AMERICAN: > 60
HGB BLD-MCNC: 14.3 G/DL
MCH RBC QN AUTO: 29.4 PG
MCHC RBC AUTO-ENTMCNC: 32.3 G/DL
MCV RBC AUTO: 91 FL
PLATELET # BLD: 221 10^3/UL
PMV BLD AUTO: 11.9 FL
RBC # BLD AUTO: 4.87 10^6/UL
WBC # BLD AUTO: 8.5 10^3/UL

## 2018-07-08 RX ADMIN — INSULIN HUMAN SCH: 100 INJECTION, SOLUTION PARENTERAL at 21:17

## 2018-07-08 RX ADMIN — INSULIN HUMAN SCH: 100 INJECTION, SOLUTION PARENTERAL at 11:33

## 2018-07-08 RX ADMIN — PANTOPRAZOLE SODIUM SCH MG: 40 TABLET, DELAYED RELEASE ORAL at 05:00

## 2018-07-08 RX ADMIN — INSULIN HUMAN SCH: 100 INJECTION, SOLUTION PARENTERAL at 07:30

## 2018-07-08 RX ADMIN — INSULIN HUMAN SCH: 100 INJECTION, SOLUTION PARENTERAL at 16:24

## 2018-07-08 NOTE — CARD
--------------- APPROVED REPORT --------------





EXAM: Two-dimensional and M-mode echocardiogram with Doppler and 

color Doppler.



Other Information 

Quality : FairRhythm : 



INDICATION

Congestive Heart Failure 



2D DIMENSIONS 

IVSd1.2   (0.7-1.1cm)LVDd6.2   (3.9-5.9cm)

PWd1.2   (0.7-1.1cm)LVDs5.6   (2.5-4.0cm)

FS (%) 9.5   %LVEF (%)20.0   (>50%)



M-Mode DIMENSIONS 

Left Atrium (MM)4.70   (2.5-4.0cm)Aortic Root3.20   (2.2-3.7cm)

Aortic Cusp Exc.1.60   (1.5-2.0cm)



Aortic Valve

AoV Peak Tabxygkm790.0cm/Khadijah P 1/2 [id number]



Mitral Valve

MV E Dllgrncl70.8cm/sMV A Xzptahmh82.5cm/sE/A ratio1.2



TDI

Lateral E' Peak V4.14cm/sMedial E' Peak V3.14cm/sE/Lateral E'23.1

E/Medial E'30.5



Tricuspid Valve

TR Peak [id number]/sRAP [id number] Peak Gr.41mmHg

[id number]



 LEFT VENTRICLE 

The Left Ventricle is moderately dilated. There is normal left 

ventricular wall thickness. Left ventricle systolic function is 

severely impaired. The Ejection Fraction is -20%. There is severe 

global hypokinesis.



 RIGHT VENTRICLE 

The right ventricle is normal size.



 ATRIA 

The left atrium is moderately dilated. The right atrium is borderline 

dilated. The interatrial septum is intact with no evidence for an 

atrial septal defect.



 AORTIC VALVE 

The aortic valve is normal in structure. There is moderate aortic 

regurgitation.



 MITRAL VALVE 

The mitral valve is normal in structure. Mitral regurgitation is mild.



 TRICUSPID VALVE 

The tricuspid valve is normal in structure. There is mild tricuspid 

regurgitation. There is moderate pulmonary hypertension.



 PULMONIC VALVE 

The pulmonic valve is not well visualized.



 GREAT VESSELS 

The aortic root is normal in size.



 PERICARDIAL EFFUSION 

There is no pericardial effusion.



<Conclusion>

The Left Ventricle is moderately dilated. 

There is normal left ventricular wall thickness.

Left ventricle systolic function is severely impaired.

The Ejection Fraction is -20%.

There is severe global hypokinesis.

There is moderate aortic regurgitation. 

Mitral regurgitation is mild.

There is mild tricuspid regurgitation.

There is moderate pulmonary hypertension.

## 2018-07-08 NOTE — CP.PCM.PN
<Deysi Dia L - Last Filed: 07/08/18 15:21>





Subjective





- Date & Time of Evaluation


Date of Evaluation: 07/08/18


Time of Evaluation: 07:00





- Subjective


Subjective: 





No acute events overnight. Patient resting comfortably this morning. States 

continuous improvement in shortness of breath. Admit to some epigastric 

discomfort which is associated with meals. She also states a sensation of food 

being "stuck." Denies chest pain, N/V, changes in bowel movements, urinary 

urgency.





Objective





- Vital Signs/Intake and Output


Vital Signs (last 24 hours): 


 











Temp Pulse Resp BP Pulse Ox


 


 98.2 F   69   17   124/80   97 


 


 07/08/18 12:00  07/08/18 12:00  07/08/18 12:00  07/08/18 12:00  07/08/18 05:48








Intake and Output: 


 











 07/08/18 07/08/18





 06:59 18:59


 


Intake Total 1560 


 


Balance 1560 














- Medications


Medications: 


 Current Medications





Aspirin (Ecotrin)  81 mg PO DAILY [facility]


   Last Admin: 07/08/18 10:14 Dose:  81 mg


Carvedilol (Coreg)  25 mg PO BID [facility]


   Last Admin: 07/08/18 10:14 Dose:  25 mg


Furosemide (Lasix)  40 mg IVP BID [facility]


   Last Admin: 07/08/18 10:13 Dose:  40 mg


Heparin Sodium (Porcine) (Heparin)  5,000 units SC Q8 [facility]


   PRN Reason: Protocol


   Last Admin: 07/08/18 05:01 Dose:  Not Given


Insulin Human Regular (Humulin R Low)  0 units SC ACHS [facility]


   PRN Reason: Protocol


   Last Admin: 07/08/18 11:33 Dose:  Not Given


Losartan Potassium (Cozaar)  100 mg PO DAILY [facility]


   Last Admin: 07/08/18 10:14 Dose:  100 mg


Pantoprazole Sodium (Protonix Ec Tab)  40 mg PO 0600 [facility]


   Last Admin: 07/08/18 05:00 Dose:  40 mg


Spironolactone (Aldactone)  25 mg PO DAILY [facility]


   Last Admin: 07/08/18 10:14 Dose:  25 mg











- Labs


Labs: 


 





 07/08/18 06:00 





 07/08/18 06:00 





 











PT  12.1 SECONDS (9.4-12.5)   07/06/18  16:08    


 


INR  1.06  (0.93-1.08)   07/06/18  16:08    


 


APTT  31.1 Seconds (25.1-36.5)   07/06/18  16:08    














- Additional Findings


Additional findings: 





- Constitutional


Appears: No Acute Distress





- Head Exam


Head Exam: ATRAUMATIC, NORMAL INSPECTION, NORMOCEPHALIC





- Eye Exam


Eye Exam: EOMI, Normal appearance, PERRL





- ENT Exam


ENT Exam: Mucous Membranes Moist, Normal Exam





- Neck Exam


Neck exam: Positive for: Normal Inspection





- Respiratory Exam


Respiratory Exam: Bibasilar crackles.  absent: Decreased Breath Sounds, Rales, 

Rhonchi, Wheezes





- Cardiovascular Exam


Cardiovascular Exam: RRR, +S1, +S2.  absent: Bradycardia, Tachycardia, 

Diastolic murmur, Gallop, JVD, Rubs, Systolic Murmur


Additional comments: 





LE edema 1+/4





- GI/Abdominal Exam


GI & Abdominal Exam: Soft, Tenderness (epigastric).  absent: Distended, Firm, 

Guarding, Mass, Rebound





- Extremities Exam


Extremities exam: Positive for: normal capillary refill, pedal edema, pedal 

pulses present.  Negative for: joint swelling, tenderness





- Back Exam


Back exam: NORMAL INSPECTION





- Neurological Exam


Neurological exam: Alert, CN II-XII Intact, Oriented x3





- Psychiatric Exam


Psychiatric exam: Normal Affect, Normal Mood





- Skin


Skin Exam: Dry, Intact, Normal Color, Warm





Assessment and Plan





- Assessment and Plan (Free Text)


Assessment: 


Patient is a 59 yo female admitted for workup and management of SOB likely 

secondary to acute systolic CHF, chest pain. 





Plan: 





Acute  on chronic systolic CHF exacerbation


- BNP 3940


- ECHO shows EF 20%, severe global hypokinesis, mild MR/TR, mod AR/pulmonary 

hypertension 


- ECHO from 2013 showed EF 27.9%, 4 chamber dilation, mild-mod MR/TR/AR


- Head of bed 30 degrees


- Strict I's and O's


- Daily weights


- Lasix 40 mg IVP BID


- Coreg 25 mg PO BID


- Losartan 100 mg PO daily


- Cardio consulted. Recommended continuation of lasix, heparin, cozaar, and 

coreg and addition of aldactone


- Patient improving clinically 


- Physical therapy consulted





Chest pain r/o ACS


- Resolving


- EKG showed sinus tachycardia at rate of 105, LVH, no acute ST-T wave changes


- CXR showed no active disease


- Troponin 0.05 x 2 


- TSH low, T3/T4 normal 


- A1c 6.6


- Lipid profile normal 


- Continue ASA 81 mg po daily


- Cardio consulted. Recs appreciated. Per Dr. Domínguez statin is not indicated 

at this time.  





Elevated D-Dimer


- D-Dimer 1108


- Wells Criteria 1.5 points, low risk for PE


- Spoke to patient regarding importance of CTA. Still refusing at this time. 

Will attempt V/Q scan. 





Epigastric pain


- Likely due to gastroesophageal reflux disease


- GI consulted, Dr. White


- Continue protonix 





LE edema/swelling


- Resolving


- Likely 2/2 to CHF


- B/L LE venous duplex shows negative DVT bilaterally





HTN


- Continue losartan, coreg, lasix, cozaar, aldactone





DM


- POC glucose 119 this morning 


- Hold Metformin


- ISS-low


- Accuchecks ACHS





Hypothyroidism


- TSH low, T3/T4 normal 





GI/DVT PPx


- Protonix


- Heparin





Patient seen and discussed with Dr. Donald Dia PGY 1 








<Stephanie Bennett R - Last Filed: 07/08/18 16:33>





Objective





- Vital Signs/Intake and Output


Vital Signs (last 24 hours): 


 











Temp Pulse Resp BP Pulse Ox


 


 98.2 F   69   17   124/80   97 


 


 07/08/18 12:00  07/08/18 12:00  07/08/18 12:00  07/08/18 12:00  07/08/18 05:48








Intake and Output: 


 











 07/08/18 07/08/18





 06:59 18:59


 


Intake Total 1560 


 


Balance 1560 














- Medications


Medications: 


 Current Medications





Aspirin (Ecotrin)  81 mg PO DAILY [facility]


   Last Admin: 07/08/18 10:14 Dose:  81 mg


Carvedilol (Coreg)  25 mg PO BID [facility]


   Last Admin: 07/08/18 10:14 Dose:  25 mg


Furosemide (Lasix)  40 mg IVP BID [facility]


   Last Admin: 07/08/18 10:13 Dose:  40 mg


Heparin Sodium (Porcine) (Heparin)  5,000 units SC Q8 [facility]


   PRN Reason: Protocol


   Last Admin: 07/08/18 14:10 Dose:  Not Given


Insulin Human Regular (Humulin R Low)  0 units SC ACHS [facility]


   PRN Reason: Protocol


   Last Admin: 07/08/18 11:33 Dose:  Not Given


Losartan Potassium (Cozaar)  100 mg PO DAILY [facility]


   Last Admin: 07/08/18 10:14 Dose:  100 mg


Pantoprazole Sodium (Protonix Ec Tab)  40 mg PO 0600 [facility]


   Last Admin: 07/08/18 05:00 Dose:  40 mg


Spironolactone (Aldactone)  25 mg PO DAILY [facility]


   Last Admin: 07/08/18 10:14 Dose:  25 mg











- Labs


Labs: 


 





 07/08/18 06:00 





 07/08/18 06:00 





 











PT  12.1 SECONDS (9.4-12.5)   07/06/18  16:08    


 


INR  1.06  (0.93-1.08)   07/06/18  16:08    


 


APTT  31.1 Seconds (25.1-36.5)   07/06/18  16:08    














Attending/Attestation





- Attestation


I have personally seen and examined this patient.: Yes


I have fully participated in the care of the patient.: Yes


I have reviewed all pertinent clinical information, including history, physical 

exam and plan: Yes


Notes (Text): 


Patient seen and examined by me at 09:30AM with resident at bedside. Case 

including physical assessment and plan discussed with resident. Agree with 

above with following additions/changes.


Patient states she is feeling okay. States she was unable to sleep overnight. 

Patient states that she normally sleeps during the day. Shortness of breath 

improved. Patient states that she is feeling chest pressure and points to her 

epigastric region whenever she eats. She states she feels like "the food is not 

going all the way down." Patient denies chest pain. Patient continues to refuse 

CTA of chest.  No headaches or dizziness. No nausea, vomiting, or abdominal 

pain. No fevers or chills. No dysuria. No diarrhea or constipation.


Physical exam:


Gen: Patient is awake and alert sitting up in bed in no acute distress


HEENT: Normocephalic atraumatic, extraocular muscles intact, pupils equal 

reactive, oropharynx is pink and moist, no pharyngeal erythema or exudate 

appreciated,  neck is supple. 


Cardiovascular: Normal rhythm, normal S1-S2, no murmurs rubs or gallops 

appreciated, improved lower extremity pitting edema. 


Pulmonary: Normal respiratory effort. Decreased breath sounds. No rhonchi or 

wheezing appreciated. Mild crackles at bases.


Gastrointestinal: Soft, obese abdomen, nontender, nondistended, positive bowel 

sounds all 4 quadrants, no guarding


Musculoskeletal: Moves all extremities, no calf tenderness


Central nervous system: AAO 3


Dermatologic: Skin warm and dry


Assessment and plan: Patient is a 58-year-old female that presented to 

emergency room with chest pain and shortness of breath. She found to have acute 

on current systolic CHF exacerbation and elevated d-dimer.


1. Acute on chronic systolic CHF exacerbation. Continues to improve. 2-D echo 

per cardiologist shows left ventricle moderately dilated, left ventricular 

systolic function severely impaired, ejection fraction approximately 20%, 

severe global hypokinesis, moderate pulmonary hypertension. Cardiology following

, recommendations appreciated. Patient refusing cardiac cath. Continue strict I'

s and O's. Continue to monitor daily weights. Continue Lasix 40 mg IV twice a 

day. Continue spironolactone, Coreg and losartan. O2 via nasal cannula as 

needed. Continue to monitor on telemetry


2. Dysphagia. Patient complaining of chest pressure after eating food. Feels 

the food is not going all the way down. GI consult, follow-up recommendations. 

Continue Protonix.


3. Chest pain. 2-D echo as above. Troponins within normal limits. Cardiology 

following, recommendations appreciated. Patient refusing cardiac cath. Continue 

aspirin and Coreg. TSH slightly low, however free T4 and total T3 within normal 

limits. Hemoglobin A1c 6.6. Lipid panel within normal limits. Continue to 

monitor on telemetry


4. Elevated d-dimer. Patient continues to refuse CTA. Pending lower extremity 

Dopplers results to rule out DVT.


5. Bilateral lower extremity edema. Likely secondary to CHF. Improving. 

Continue Lasix 40 mg IV twice a day. Follow-up lower extremity Doppler results.


6. Essential hypertension. Continue Coreg and losartan.


7. Type 2 diabetes, non-insulin-dependent. Continue insulin sliding scale. 

Continue diabetic diet. Continue to monitor Accu-Cheks. Patient counseled on 

diet and exercise. Hemoglobin A1c 6.6


8. History of hypothyroidism. Patient states that she stopped her medication 

because she did not want to take too many medications. Importance of compliance 

of medications discussed in detail with patient. TSH slightly low, however free 

T4 and total T3 within normal limits.


9. Leukocytosis. Resolved. Likely reactive. 


10. GI and DVT prophylaxis. Protonix and heparin 5000 units subcutaneous every 

8 hours


11. Patient is a full code


Case was discussed in detail with the patient and medical resident regarding 

current diagnosis and treatment plan

## 2018-07-08 NOTE — PN
DATE:  07/08/2018



FOLLOWUP



SUBJECTIVE:  The patient's shortness of breath and leg swelling have

improved.



PHYSICAL EXAMINATION:

VITAL SIGNS:  Blood pressure 124/80, heart rate 69, temperature 98.2,

respirations 17.

HEENT:  Normocephalic.

CHEST:  Minimal basal rhonchi.

HEART:  S1 and S2 regular.

ABDOMEN:  Soft.

EXTREMITIES:  Improved leg edema.



LABORATORY DATA:  Today's hemoglobin and hematocrit, white count and

platelet count are within normal limit.  Today's SMA-7 is within normal

limit except for glucose of 126, chloride 95, CO2 of 35.  Echocardiography

study revealed ejection fraction was measured at 20% with severe global

hypokinesis, moderate aortic insufficiency and moderate pulmonary

hypertension.



ASSESSMENT:

1.  Cardiomyopathy.

2.  Right lower lobe pneumonia.

3.  Hypertension.

4.  Morbid obesity.



RECOMMENDATIONS:  Continue Aldactone 25 mg once a day, Coreg 25 mg twice a

day, Cozaar 100 mg once a day, aspirin 81 mg once a day, subcutaneous

heparin 5000 units every 8 hours, Lasix 40 mg intravenously twice a day. 

The patient was advised cardiac catheterization; however, at this time she

refuses this idea.





__________________________________________

Carlos Friedman MD





DD:  07/08/2018 14:25:52

DT:  07/08/2018 15:46:21

Job # [id number]

## 2018-07-08 NOTE — RAD
HISTORY:

Pneumonia.



COMPARISON:

07/06/2018.



TECHNIQUE:

Chest PA and lateral



FINDINGS:



LUNGS:

No active pulmonary disease.



PLEURA:

No significant pleural effusion identified. No pneumothorax apparent.



CARDIOVASCULAR:

Cardiomegaly.  No evidence of acute, significant cardiovascular 

disease. 



OSSEOUS STRUCTURES:

No significant abnormalities.



VISUALIZED UPPER ABDOMEN:

Normal.



OTHER FINDINGS:

None.



IMPRESSION:

No active disease. No significant interval change compared to the 

prior examination(s).

## 2018-07-09 VITALS
DIASTOLIC BLOOD PRESSURE: 87 MMHG | HEART RATE: 75 BPM | SYSTOLIC BLOOD PRESSURE: 126 MMHG | RESPIRATION RATE: 19 BRPM | TEMPERATURE: 98.2 F

## 2018-07-09 VITALS — OXYGEN SATURATION: 95 %

## 2018-07-09 VITALS — HEART RATE: 72 BPM

## 2018-07-09 LAB
ALBUMIN SERPL-MCNC: 4.3 G/DL
ALBUMIN/GLOB SERPL: 1.2 {RATIO}
ALT SERPL-CCNC: 30 U/L
AST SERPL-CCNC: 26 U/L
BUN SERPL-MCNC: 22 MG/DL
CALCIUM SERPL-MCNC: 9.1 MG/DL
ERYTHROCYTE [DISTWIDTH] IN BLOOD BY AUTOMATED COUNT: 13.7 %
GFR NON-AFRICAN AMERICAN: > 60
HGB BLD-MCNC: 15.5 G/DL
MCH RBC QN AUTO: 30 PG
MCHC RBC AUTO-ENTMCNC: 33.1 G/DL
MCV RBC AUTO: 90.7 FL
PLATELET # BLD: 243 10^3/UL
PMV BLD AUTO: 12.1 FL
RBC # BLD AUTO: 5.16 10^6/UL
WBC # BLD AUTO: 8.2 10^3/UL

## 2018-07-09 RX ADMIN — INSULIN HUMAN SCH: 100 INJECTION, SOLUTION PARENTERAL at 08:24

## 2018-07-09 RX ADMIN — PANTOPRAZOLE SODIUM SCH MG: 40 TABLET, DELAYED RELEASE ORAL at 05:30

## 2018-07-09 RX ADMIN — INSULIN HUMAN SCH: 100 INJECTION, SOLUTION PARENTERAL at 12:35

## 2018-07-09 NOTE — US
HISTORY:

Leg pain and swelling. Evaluate for DVT



PHYSICIAN(S):  Rakesh Mclain MD.



TECHNIQUE:

Duplex sonography and color-flow Doppler with graded compression were 

used to evaluate the deep venous systems of both lower extremities. 

The lower femoral veins and tibial veins are not well seen due to 

body habitus and edema.



FINDINGS:

The visualized deep venous systems of both lower extremities are 

sonographically normal and compressible. Normal wave forms and 

augmentation are seen. There is no sonographic evidence for deep 

venous thrombosis in the visualized segments of both lower 

extremities.



IMPRESSION:

No sonographic evidence for deep venous thrombosis in the visualized 

segments of both lower extremities. 



Very limited study.

## 2018-07-09 NOTE — CP.PCM.CON
<Julio Barrios - Last Filed: 07/09/18 09:48>





History of Present Illness





- History of Present Illness


History of Present Illness: 


PGY-4 GI Fellow Consult Note





Mrs. Gonzalez is a 59 yo Hisp Female with h/o Systolic CHF, HTN, DM, Hypothyroid 

admitted for decompensated CHF.  GI consulted for sensation of items stuck in 

throat when swallowing.  Patient states that about a year ago she noticed that 

she intermittently feels as though certain pills are "slow to move" in her 

throat when swallowing.  When asked about certain pills she reports that she 

feels as though potassium causes her some occasional trouble.    However, she 

denies any trouble with swallowing food or water.  She cannot identify any 

precipitating or alleviating factors.Furthermore, she states that she has not 

felt the sensation of any pills getting stuck for the last several days.  She 

denies any h/o food impaction, EGDs/CSPY, melena, hematochezia nor weight loss.





12 point ROS negative other than stated above





MHx


Systolic CHF, Dilated Cardiomyopathy (with last EF 20%)


DM2


HTN


HLD


Hypothyroid


TIA


SurgHx


Appendectomy, Tonsillectomy, C-sectionx4


Meds


Zolpidem


Venlafaxine


Valsartan


Furosemide


Carvedilol


ASA


FamHx


M: DM


SocHx: Former smoker, Denied EtOH and Illicits


All: NKDA





Past Patient History





- Infectious Disease


Hx of Infectious Diseases: None





- Tetanus Immunizations


Tetanus Immunization: Unknown





- Past Social History


Smoking Status: Former Smoker





- CARDIAC


Hx Cardiac Disorders: Yes


Hx Circulatory Problems: Yes


Hx Congestive Heart Failure: Yes


Hx Hypertension: Yes


Hx Peripheral Edema: Yes





- PULMONARY


Hx Respiratory Disorders: Yes (USED TO SMOKE CIGARETTES 3 CIG A DAY FOR 15 YRS.)





- NEUROLOGICAL


Hx Neurological Disorder: No





- HEENT


Hx HEENT Problems: No





- RENAL


Hx Chronic Kidney Disease: No





- ENDOCRINE/METABOLIC


Hx Endocrine Disorders: Yes


Hx Diabetes Mellitus Type 2: Yes





- HEMATOLOGICAL/ONCOLOGICAL


Hx Blood Disorders: No





- INTEGUMENTARY


Hx Dermatological Problems: No





- MUSCULOSKELETAL/RHEUMATOLOGICAL


Hx Musculoskeletal Disorders: Yes


Hx Arthritis: Yes


Hx Falls: Yes





- GASTROINTESTINAL


Hx Gastrointestinal Disorders: Yes (APPENDECTOMY,TONSILLECTOMY)


Hx Gall Bladder Disease: Yes (CHOLEYCYSTECTOMY)





- GENITOURINARY/GYNECOLOGICAL


Hx Genitourinary Disorders: No





- PSYCHIATRIC


Hx Psychophysiologic Disorder: Yes


Hx Anxiety: Yes


Hx Depression: Yes


Hx Substance Use: No





- SURGICAL HISTORY


Hx Surgeries: Yes (TONSILLECTOMY,C SECTIONS X 4)


Hx Appendectomy: Yes


Hx Cholecystectomy: Yes





Meds


Allergies/Adverse Reactions: 


 Allergies











Allergy/AdvReac Type Severity Reaction Status Date / Time


 


No Known Allergies Allergy   Verified 07/06/18 21:10














- Medications


Medications: 


 Current Medications





Aspirin (Ecotrin)  81 mg PO DAILY [facility]


   Last Admin: 07/08/18 10:14 Dose:  81 mg


Carvedilol (Coreg)  25 mg PO BID [facility]


   Last Admin: 07/08/18 17:24 Dose:  25 mg


Furosemide (Lasix)  40 mg IVP BID [facility]


   Last Admin: 07/08/18 17:24 Dose:  40 mg


Heparin Sodium (Porcine) (Heparin)  5,000 units SC Q8 [facility]


   PRN Reason: Protocol


   Last Admin: 07/09/18 05:26 Dose:  Not Given


Insulin Human Regular (Humulin R Low)  0 units SC ACHS [facility]


   PRN Reason: Protocol


   Last Admin: 07/08/18 21:17 Dose:  Not Given


Losartan Potassium (Cozaar)  100 mg PO DAILY [facility]


   Last Admin: 07/08/18 10:14 Dose:  100 mg


Pantoprazole Sodium (Protonix Ec Tab)  40 mg PO 0600 [facility]


   Last Admin: 07/09/18 05:30 Dose:  40 mg


Spironolactone (Aldactone)  25 mg PO DAILY [facility]


   Last Admin: 07/08/18 10:14 Dose:  25 mg











Physical Exam





- Constitutional


Appears: No Acute Distress, Chronically Ill





- Head Exam


Head Exam: ATRAUMATIC, NORMOCEPHALIC





- Eye Exam


Eye Exam: EOMI.  absent: Scleral icterus





- ENT Exam


ENT Exam: Mucous Membranes Dry, Normal External Ear Exam





- Neck Exam


Additional comments: 





Soft, round palpable mass under R mandible (chronic for years per pt)





- Respiratory Exam


Respiratory Exam: Clear to Auscultation Bilateral.  absent: Accessory Muscle Use

, Prolonged Expiratory Phase





- Cardiovascular Exam


Cardiovascular Exam: RRR.  absent: Systolic Murmur





- GI/Abdominal Exam


GI & Abdominal Exam: Normal Bowel Sounds, Soft.  absent: Bruit, Distended, Firm

, Guarding, Rigid, Tenderness





- Rectal Exam


Rectal Exam: Deferred





- Neurological Exam


Neurological exam: Alert, CN II-XII Intact, Oriented x3





- Psychiatric Exam


Psychiatric exam: Normal Affect, Normal Mood





- Skin


Skin Exam: Dry, Normal Color





Results





- Vital Signs


Recent Vital Signs: 


 Last Vital Signs











Temp  98.0 F   07/09/18 05:36


 


Pulse  75   07/09/18 05:36


 


Resp  20   07/09/18 05:36


 


BP  123/81   07/09/18 05:36


 


Pulse Ox  95   07/09/18 05:36














- Labs


Result Diagrams: 


 07/09/18 05:30





 07/09/18 05:30


Labs: 


 Laboratory Results - last 24 hr











  07/06/18 07/07/18 07/08/18





  18:00 21:44 07:26


 


WBC   


 


RBC   


 


Hgb   


 


Hct   


 


MCV   


 


MCH   


 


MCHC   


 


RDW   


 


Plt Count   


 


MPV   


 


Sodium   


 


Potassium   


 


Chloride   


 


Carbon Dioxide   


 


Anion Gap   


 


BUN   


 


Creatinine   


 


Est GFR ( Amer)   


 


Est GFR (Non-Af Amer)   


 


POC Glucose (mg/dL)   141 H  119 H


 


Random Glucose   


 


Hemoglobin A1c  6.6 H  


 


Calcium   


 


Phosphorus   


 


Magnesium   


 


Total Bilirubin   


 


AST   


 


ALT   


 


Alkaline Phosphatase   


 


Total Protein   


 


Albumin   


 


Globulin   


 


Albumin/Globulin Ratio   














  07/08/18 07/08/18 07/08/18





  11:16 15:52 21:13


 


WBC   


 


RBC   


 


Hgb   


 


Hct   


 


MCV   


 


MCH   


 


MCHC   


 


RDW   


 


Plt Count   


 


MPV   


 


Sodium   


 


Potassium   


 


Chloride   


 


Carbon Dioxide   


 


Anion Gap   


 


BUN   


 


Creatinine   


 


Est GFR ( Amer)   


 


Est GFR (Non-Af Amer)   


 


POC Glucose (mg/dL)  96  99  111 H


 


Random Glucose   


 


Hemoglobin A1c   


 


Calcium   


 


Phosphorus   


 


Magnesium   


 


Total Bilirubin   


 


AST   


 


ALT   


 


Alkaline Phosphatase   


 


Total Protein   


 


Albumin   


 


Globulin   


 


Albumin/Globulin Ratio   














  07/09/18 07/09/18





  05:30 05:30


 


WBC  8.2 


 


RBC  5.16 


 


Hgb  15.5 


 


Hct  46.8 


 


MCV  90.7 


 


MCH  30.0 


 


MCHC  33.1 


 


RDW  13.7 


 


Plt Count  243 


 


MPV  12.1 H 


 


Sodium   143


 


Potassium   3.8


 


Chloride   95 L


 


Carbon Dioxide   39 H


 


Anion Gap   13


 


BUN   22 H


 


Creatinine   0.9


 


Est GFR ( Amer)   > 60


 


Est GFR (Non-Af Amer)   > 60


 


POC Glucose (mg/dL)  


 


Random Glucose   122 H


 


Hemoglobin A1c  


 


Calcium   9.1


 


Phosphorus   4.2


 


Magnesium   2.0


 


Total Bilirubin   0.8


 


AST   26


 


ALT   30


 


Alkaline Phosphatase   64


 


Total Protein   8.0


 


Albumin   4.3


 


Globulin   3.7


 


Albumin/Globulin Ratio   1.2














Assessment & Plan





- Assessment and Plan (Free Text)


Assessment: 





59 yo Hisp Female with Systolic CHF, HTN, DM complaining of intermittent 

sensation of pills getting stuck with swallowing.





Intermittent Dysphagia: Pt gestures to area around suprasternal notch 

suggestive of upper esophageal dysphagia.  However, pt denies any symptoms with 

food or water but rather with pills that are known to be larger and difficult 

to swallow such as potassium pills.  Reassuringly, pt denies any h/o weight loss

, melena nor hematochezia.  Symtoms possible related to candidada esophagitis 

given pt a diabetic.


Plan: 





Continue to treat decompensated CHF as you are


No acute indications for EGD at this time as pt tolerated PO w/o difficulty


Plan to see patient as outpatient with Dr. White





Pt seen and examined with Dr. White





<Eric White - Last Filed: 07/09/18 19:05>





Results





- Vital Signs


Recent Vital Signs: 


 Last Vital Signs











Temp  98.2 F   07/09/18 12:00


 


Pulse  75   07/09/18 12:00


 


Resp  19   07/09/18 12:00


 


BP  126/87   07/09/18 12:00


 


Pulse Ox  95   07/09/18 05:36














- Labs


Result Diagrams: 


 07/09/18 05:30





 07/09/18 05:30


Labs: 


 Laboratory Results - last 24 hr











  07/08/18 07/08/18 07/09/18





  15:52 21:13 05:30


 


WBC    8.2


 


RBC    5.16


 


Hgb    15.5


 


Hct    46.8


 


MCV    90.7


 


MCH    30.0


 


MCHC    33.1


 


RDW    13.7


 


Plt Count    243


 


MPV    12.1 H


 


Sodium   


 


Potassium   


 


Chloride   


 


Carbon Dioxide   


 


Anion Gap   


 


BUN   


 


Creatinine   


 


Est GFR ( Amer)   


 


Est GFR (Non-Af Amer)   


 


POC Glucose (mg/dL)  99  111 H 


 


Random Glucose   


 


Calcium   


 


Phosphorus   


 


Magnesium   


 


Total Bilirubin   


 


AST   


 


ALT   


 


Alkaline Phosphatase   


 


Total Protein   


 


Albumin   


 


Globulin   


 


Albumin/Globulin Ratio   














  07/09/18 07/09/18





  05:30 11:13


 


WBC  


 


RBC  


 


Hgb  


 


Hct  


 


MCV  


 


MCH  


 


MCHC  


 


RDW  


 


Plt Count  


 


MPV  


 


Sodium  143 


 


Potassium  3.8 


 


Chloride  95 L 


 


Carbon Dioxide  39 H 


 


Anion Gap  13 


 


BUN  22 H 


 


Creatinine  0.9 


 


Est GFR ( Amer)  > 60 


 


Est GFR (Non-Af Amer)  > 60 


 


POC Glucose (mg/dL)   107


 


Random Glucose  122 H 


 


Calcium  9.1 


 


Phosphorus  4.2 


 


Magnesium  2.0 


 


Total Bilirubin  0.8 


 


AST  26 


 


ALT  30 


 


Alkaline Phosphatase  64 


 


Total Protein  8.0 


 


Albumin  4.3 


 


Globulin  3.7 


 


Albumin/Globulin Ratio  1.2 














Attending/Attestation





- Attestation


I have personally seen and examined this patient.: Yes


I have fully participated in the care of the patient.: Yes


I have reviewed all pertinent clinical information: Yes


Notes (Text): 





07/09/18 19:04


This is a 58 year old  Female with Systolic CHF, HTN, DM complaining of 

intermittent sensation of pills getting stuck with swallowing intermittently. 

May have component of candida due to DM - can get outpatient EGD once cardiac 

issues have resolved.

## 2018-07-09 NOTE — PN
DATE:  07/09/2018



REASON FOR THE CONSULTATION AND FOLLOWUP:  Cardiac evaluation,

cardiomyopathy, admitted with right lower lobe pneumonia, hypertension and

morbid obesity.



SUBJECTIVE:  The patient denies any chest pain, shortness of breath or any

palpitation.



OBJECTIVE:

GENERAL:  Not in any apparent distress.

VITAL SIGNS:  Temperature afebrile, heart rate 89, blood pressure 121/67.

HEENT:  PERRLA.  Extraocular muscles intact.

NECK:  Supple.  No carotid bruit.  No thyromegaly.

CHEST:  Clear to auscultation.

HEART:  S1 and S2 regular.

ABDOMEN:  Soft.

EXTREMITIES:  Clubbing and cyanosis negative.



LABORATORY DATA:  Blood workup as follows:  WBC 8.2, hemoglobin 15.3,

hematocrit 46.8, platelet count 243.  Chemistry shows sodium 143, potassium

_____, chloride 95, carbon dioxide 39, anion gap of 13, BUN 22, creatinine

0.9.



IMPRESSION:  A 58-year-old female with past medical history of

cardiomyopathy, hypertension, admitted with right lower lobe pneumonia,

morbid obesity.  The patient is being followed by Dr. Boyer.  Cardiac

catheterization was offered by them, but the patient refused cardiac

catheterization.  Discussed this morning, again the patient does not want

cardiac catheterization.  Treat medically.  Upon discharge, the patient

will follow up with Dr. Boyer.  Continue carvedilol 25 mg twice a day,

continue Aldactone 25 mg daily, continue baby aspirin, continue deep venous

thrombosis prophylaxis, continue Lasix.  Start low dose of

angiotensin-converting enzyme as blood pressure and kidney function is

tolerated.  As mentioned, the patient had echocardiography on 07/07/2018

that shows ejection fraction 20%, left ventricular systolic function

severely impaired.  Moderately dilated left ventricle, inferior global

hypokinesis, moderate aortic regurgitation, mild mitral regurgitation, mild

tricuspid regurgitation, right ventricular systolic pressure of 51. 

Discussed in length with the patient that _____ coronary anatomy because of

severely decreased left ventricular function, the patient does not want

cardiac catheterization.  It was offered also by Dr. Boyer.  So, we will

adjust the medication and upon discharge, the patient will be followed with

Dr. Boyer.  In the interim, continue spironolactone, continue diuretics,

continue losartan, angiotensin receptor blocker inhibitor, continue Coreg,

continue baby aspirin.  We will add low dose of digoxin and discontinue

telemetry.  The patient is okay to be discharged.  Upon followup, the

patient will be followed up with Dr. Boyer.



Thank you, Dr. Mendez, for providing us the opportunity in taking care of

the patient, Sunshine Gonzalez.





__________________________________________

Booker Peck MD





DD:  07/09/2018 12:06:28

DT:  07/09/2018 13:37:27

Job # [id number]

## 2018-07-09 NOTE — CP.PCM.DIS
Addendum entered and electronically signed by Deysi Dia DO  07/09/18 19:36: 





On discharge exam, patient physical is as follows: 





- Constitutional


Appears: No Acute Distress





- Head Exam


Head Exam: ATRAUMATIC, NORMAL INSPECTION, NORMOCEPHALIC





- Eye Exam


Eye Exam: EOMI, Normal appearance, PERRL





- ENT Exam


ENT Exam: Mucous Membranes Moist, Normal Exam





- Neck Exam


Neck exam: Positive for: Normal Inspection





- Respiratory Exam


Respiratory Exam: Clear to auscultation bilaterally.  absent: Decreased Breath 

Sounds, Rales, Rhonchi, Wheezes





- Cardiovascular Exam


Cardiovascular Exam: RRR, +S1, +S2.  absent: Bradycardia, Tachycardia, 

Diastolic murmur, Gallop, JVD, Rubs, Systolic Murmur





- GI/Abdominal Exam


GI & Abdominal Exam: Soft, nontender with no organomegaly.  absent: Distended, 

Firm, Guarding, Mass, Rebound





- Extremities Exam


Extremities exam: Positive for: normal capillary refill, pedal edema, pedal 

pulses present.  Negative for: joint swelling, tenderness





- Back Exam


Back exam: NORMAL INSPECTION





- Neurological Exam


Neurological exam: Alert, CN II-XII Intact, Oriented x3





- Psychiatric Exam


Psychiatric exam: Normal Affect, Normal Mood





- Skin


Skin Exam: Dry, Intact, Normal Color, Warm








Original Note:








<Deysi Dia - Last Filed: 07/09/18 19:22>





Provider





- Provider


Date of Admission: 


07/06/18 16:50





Attending physician: 


Haven Gould MD





Primary care physician: 


Cierra Machado DO





Time Spent in preparation of Discharge (in minutes): 45





Diagnosis





- Discharge Diagnosis


(1) Acute exacerbation of CHF (congestive heart failure)


Status: Acute   Priority: High   





(2) Chest pain


Status: Resolved   Priority: High   





(3) History of diabetes mellitus


Status: Chronic   Priority: Medium   





(4) History of hypertension


Status: Chronic   Priority: Medium   





(5) History of hypothyroidism


Status: Chronic   Priority: Medium   





(6) History of TIA (transient ischemic attack)


Status: Resolved   Priority: Medium   





Hospital Course





- Lab Results


Lab Results: 


 Most Recent Lab Values











WBC  8.2 10^3/ul (4.5-11.0)   07/09/18  05:30    


 


RBC  5.16 10^6/uL (3.5-6.1)   07/09/18  05:30    


 


Hgb  15.5 g/dL (12.0-16.0)   07/09/18  05:30    


 


Hct  46.8 % (36.0-48.0)   07/09/18  05:30    


 


MCV  90.7 fl (80.0-105.0)   07/09/18  05:30    


 


MCH  30.0 pg (25.0-35.0)   07/09/18  05:30    


 


MCHC  33.1 g/dl (31.0-37.0)   07/09/18  05:30    


 


RDW  13.7 % (11.5-14.5)   07/09/18  05:30    


 


Plt Count  243 10^3/uL (120.0-450.0)   07/09/18  05:30    


 


MPV  12.1 fl (7.0-11.0)  H  07/09/18  05:30    


 


Gran %  78.4 % (50.0-68.0)  H  07/06/18  16:08    


 


Lymph % (Auto)  14.7 % (22.0-35.0)  L  07/06/18  16:08    


 


Mono % (Auto)  6.1 % (1.0-6.0)  H  07/06/18  16:08    


 


Eos % (Auto)  0.5 % (1.5-5.0)  L  07/06/18  16:08    


 


Baso % (Auto)  0.3 % (0.0-3.0)   07/06/18  16:08    


 


Gran #  10.50  (1.4-6.5)  H  07/06/18  16:08    


 


Lymph # (Auto)  2.0  (1.2-3.4)   07/06/18  16:08    


 


Mono # (Auto)  0.8  (0.1-0.6)  H  07/06/18  16:08    


 


Eos # (Auto)  0.1  (0.0-0.7)   07/06/18  16:08    


 


Baso # (Auto)  0.04 K/mm3 (0.0-2.0)   07/06/18  16:08    


 


PT  12.1 SECONDS (9.4-12.5)   07/06/18  16:08    


 


INR  1.06  (0.93-1.08)   07/06/18  16:08    


 


APTT  31.1 Seconds (25.1-36.5)   07/06/18  16:08    


 


D-Dimer, Quantitative  1108 ng/mL (0-243)  H  07/06/18  16:08    


 


Sodium  143 mmol/L (132-148)   07/09/18  05:30    


 


Potassium  3.8 mmol/L (3.6-5.0)   07/09/18  05:30    


 


Chloride  95 mmol/L ()  L  07/09/18  05:30    


 


Carbon Dioxide  39 mmol/L (21-33)  H  07/09/18  05:30    


 


Anion Gap  13  (10-20)   07/09/18  05:30    


 


BUN  22 mg/dL (7-21)  H  07/09/18  05:30    


 


Creatinine  0.9 mg/dl (0.7-1.2)   07/09/18  05:30    


 


Est GFR ( Amer)  > 60   07/09/18  05:30    


 


Est GFR (Non-Af Amer)  > 60   07/09/18  05:30    


 


POC Glucose (mg/dL)  107 mg/dL ()   07/09/18  11:13    


 


Random Glucose  122 mg/dL ()  H  07/09/18  05:30    


 


Hemoglobin A1c  6.6 % (4.2-6.5)  H  07/06/18  18:00    


 


Calcium  9.1 mg/dL (8.4-10.5)   07/09/18  05:30    


 


Phosphorus  4.2 mg/dL (2.5-4.5)   07/09/18  05:30    


 


Magnesium  2.0 mg/dL (1.7-2.2)   07/09/18  05:30    


 


Total Bilirubin  0.8 mg/dL (0.2-1.3)   07/09/18  05:30    


 


AST  26 U/L (14-36)   07/09/18  05:30    


 


ALT  30 U/L (7-56)   07/09/18  05:30    


 


Alkaline Phosphatase  64 U/L ()   07/09/18  05:30    


 


Lactate Dehydrogenase  592 U/L (333-699)   07/06/18  16:08    


 


Total Creatine Kinase  52 U/L ()   07/06/18  16:08    


 


Troponin I  0.05 ng/mL  07/07/18  04:20    


 


NT-Pro-B Natriuret Pep  3940 pg/mL (0-450)  H  07/06/18  16:08    


 


Total Protein  8.0 g/dL (5.8-8.3)   07/09/18  05:30    


 


Albumin  4.3 g/dL (3.0-4.8)   07/09/18  05:30    


 


Globulin  3.7 gm/dL  07/09/18  05:30    


 


Albumin/Globulin Ratio  1.2  (1.1-1.8)   07/09/18  05:30    


 


Triglycerides  150 mg/dL ()   07/06/18  18:00    


 


Cholesterol  182 mg/dL (130-200)   07/06/18  18:00    


 


LDL Cholesterol Direct  113 mg/dL (0-129)   07/06/18  18:00    


 


HDL Cholesterol  43 mg/dL (29-60)   07/06/18  18:00    


 


Lipase  60 U/L ()   07/06/18  16:08    


 


Free T4  1.21 ng/dL (0.78-2.19)   07/06/18  18:00    


 


Total T3  1.12 ng/mL (0.97-1.69)   07/06/18  18:00    


 


TSH 3rd Generation  0.24 mIU/mL (0.46-4.68)  L  07/06/18  18:00    


 


Urine Color  Yellow  (YELLOW)   07/06/18  16:15    


 


Urine Appearance  Clear  (CLEAR)   07/06/18  16:15    


 


Urine pH  6.5  (4.7-8.0)   07/06/18  16:15    


 


Ur Specific Gravity  1.020  (1.005-1.035)   07/06/18  16:15    


 


Urine Protein  100 mg/dL (<30 mg/dL)  H  07/06/18  16:15    


 


Urine Glucose (UA)  Negative mg/dL (NEGATIVE)   07/06/18  16:15    


 


Urine Ketones  Negative mg/dL (NEGATIVE)   07/06/18  16:15    


 


Urine Blood  Trace-intact  (NEGATIVE)  H  07/06/18  16:15    


 


Urine Nitrate  Negative  (NEGATIVE)   07/06/18  16:15    


 


Urine Bilirubin  Negative  (NEGATIVE)   07/06/18  16:15    


 


Urine Urobilinogen  0.2 E.U./dL (<1 E.U./dL)   07/06/18  16:15    


 


Ur Leukocyte Esterase  Negative Alaina/uL (NEGATIVE)   07/06/18  16:15    


 


Urine RBC  1 - 3 /hpf (0-2)   07/06/18  16:15    


 


Urine WBC  0 - 2 /hpf (0-6)   07/06/18  16:15    


 


Ur Epithelial Cells  3 - 4 /hpf (0-5)   07/06/18  16:15    


 


Urine Bacteria  Few  (NEG)   07/06/18  16:15    














- Hospital Course


Hospital Course: 





57 yo F with PMH of HTN, DM, hypothyroidism, dilated cardiomyopathy, and TIA 

presents to [facility] [place] for chest pain and shortness of breath. She was admitted for 

evaluation and treatment for acute systolic CHF exacerbation in addition to her 

chest pain and elevated d-dimer on labs. She was treated with Lasix 40 mg IVP 

BID, Coreg 25 mg PO BID, Losartan 100 mg PO dailly. Cardiology was consulted. 

Echocardiogram was ordered which showed global hypokinesis, EF of 20%, moderate 

AR, mild MR/TR, right ventricular systolic pressure of 51. Cardiac 

catheterization was offered however patient refused. Aldactone and low dose of 

digoxin were started in addition to patient's home medications. 





To rule out ACS, patient had EKG done in ED which showed sinus tachy at rate of 

105, LVH, no acute ST-T wave changes. Troponins were 0.05 x2. CXR showed no 

active disease. TSH was low, T3/T4 were normal. Hgba1c was 6.6. Lipid profile 

within normal limits. Per cardiology consult, statin was not indicated at this 

time. The patient also had an elevated d-dimer level of 1108. Per Well's 

criteria of 1.5 points, there was low risk for PE. Venous dopplers were 

negative for DVT bilaterally. The patient was advised on importance of CTA; 

however patient refused imaging. 





Patient also complained of epigastric discomfort during her admission. GI was 

consulted, stated no acute indications for EGD at the time as patient tolerated 

oral intake without difficulty. Patient was advised to follow up outpatient 

with Dr. White.





Patient was medically optimized for discharge and advised to take medications 

as instructed and asked to follow up with her PMD within a week. Patient was 

also advised to come back to the ED if symptoms return.  





Discharge Exam





- Head Exam


Head Exam: ATRAUMATIC, NORMOCEPHALIC





Discharge Plan





- Discharge Medications


Prescriptions: 


Digoxin [Lanoxin] 0.25 mg PO 1400 14 Days  tab


Spironolactone [Aldactone] 25 mg PO DAILY 14 Days  tab





- Follow Up Plan


Condition: SERIOUS


Disposition: HOME/ ROUTINE


Instructions:  Heart Failure, Adult, Chest Pain


Additional Instructions: 


Patient Instructions:


Take medications as prescribed. You have been started digoxin and aldactone. 


Follow up with primary care physician Dr. Machado and cardiologist Dr. Boyer 

within three to five days from discharge.


Return to the emergency room for evaluation of intractable headache, fever, 

chills, dizziness, chest pain, shortness of breath, abdominal pain, nausea, 

vomiting, diarrhea, constipation, and urinary symptoms. 


Referrals: 


Eric White MD [Medical Doctor] - 


Gerardo Boyer MD [Medical Doctor] - 


Cierra Machado DO [Primary Care Provider] - 





<Haven Gould - Last Filed: 07/10/18 14:15>





Provider





- Provider


Date of Admission: 


07/06/18 16:50





Attending physician: 


Haven Gould MD





Primary care physician: 


Cierra Machado DO








Hospital Course





- Lab Results


Lab Results: 


 Most Recent Lab Values











WBC  8.2 10^3/ul (4.5-11.0)   07/09/18  05:30    


 


RBC  5.16 10^6/uL (3.5-6.1)   07/09/18  05:30    


 


Hgb  15.5 g/dL (12.0-16.0)   07/09/18  05:30    


 


Hct  46.8 % (36.0-48.0)   07/09/18  05:30    


 


MCV  90.7 fl (80.0-105.0)   07/09/18  05:30    


 


MCH  30.0 pg (25.0-35.0)   07/09/18  05:30    


 


MCHC  33.1 g/dl (31.0-37.0)   07/09/18  05:30    


 


RDW  13.7 % (11.5-14.5)   07/09/18  05:30    


 


Plt Count  243 10^3/uL (120.0-450.0)   07/09/18  05:30    


 


MPV  12.1 fl (7.0-11.0)  H  07/09/18  05:30    


 


Gran %  78.4 % (50.0-68.0)  H  07/06/18  16:08    


 


Lymph % (Auto)  14.7 % (22.0-35.0)  L  07/06/18  16:08    


 


Mono % (Auto)  6.1 % (1.0-6.0)  H  07/06/18  16:08    


 


Eos % (Auto)  0.5 % (1.5-5.0)  L  07/06/18  16:08    


 


Baso % (Auto)  0.3 % (0.0-3.0)   07/06/18  16:08    


 


Gran #  10.50  (1.4-6.5)  H  07/06/18  16:08    


 


Lymph # (Auto)  2.0  (1.2-3.4)   07/06/18  16:08    


 


Mono # (Auto)  0.8  (0.1-0.6)  H  07/06/18  16:08    


 


Eos # (Auto)  0.1  (0.0-0.7)   07/06/18  16:08    


 


Baso # (Auto)  0.04 K/mm3 (0.0-2.0)   07/06/18  16:08    


 


PT  12.1 SECONDS (9.4-12.5)   07/06/18  16:08    


 


INR  1.06  (0.93-1.08)   07/06/18  16:08    


 


APTT  31.1 Seconds (25.1-36.5)   07/06/18  16:08    


 


D-Dimer, Quantitative  1108 ng/mL (0-243)  H  07/06/18  16:08    


 


Sodium  143 mmol/L (132-148)   07/09/18  05:30    


 


Potassium  3.8 mmol/L (3.6-5.0)   07/09/18  05:30    


 


Chloride  95 mmol/L ()  L  07/09/18  05:30    


 


Carbon Dioxide  39 mmol/L (21-33)  H  07/09/18  05:30    


 


Anion Gap  13  (10-20)   07/09/18  05:30    


 


BUN  22 mg/dL (7-21)  H  07/09/18  05:30    


 


Creatinine  0.9 mg/dl (0.7-1.2)   07/09/18  05:30    


 


Est GFR ( Amer)  > 60   07/09/18  05:30    


 


Est GFR (Non-Af Amer)  > 60   07/09/18  05:30    


 


POC Glucose (mg/dL)  107 mg/dL ()   07/09/18  11:13    


 


Random Glucose  122 mg/dL ()  H  07/09/18  05:30    


 


Hemoglobin A1c  6.6 % (4.2-6.5)  H  07/06/18  18:00    


 


Calcium  9.1 mg/dL (8.4-10.5)   07/09/18  05:30    


 


Phosphorus  4.2 mg/dL (2.5-4.5)   07/09/18  05:30    


 


Magnesium  2.0 mg/dL (1.7-2.2)   07/09/18  05:30    


 


Total Bilirubin  0.8 mg/dL (0.2-1.3)   07/09/18  05:30    


 


AST  26 U/L (14-36)   07/09/18  05:30    


 


ALT  30 U/L (7-56)   07/09/18  05:30    


 


Alkaline Phosphatase  64 U/L ()   07/09/18  05:30    


 


Lactate Dehydrogenase  592 U/L (333-699)   07/06/18  16:08    


 


Total Creatine Kinase  52 U/L ()   07/06/18  16:08    


 


Troponin I  0.05 ng/mL  07/07/18  04:20    


 


NT-Pro-B Natriuret Pep  3940 pg/mL (0-450)  H  07/06/18  16:08    


 


Total Protein  8.0 g/dL (5.8-8.3)   07/09/18  05:30    


 


Albumin  4.3 g/dL (3.0-4.8)   07/09/18  05:30    


 


Globulin  3.7 gm/dL  07/09/18  05:30    


 


Albumin/Globulin Ratio  1.2  (1.1-1.8)   07/09/18  05:30    


 


Triglycerides  150 mg/dL ()   07/06/18  18:00    


 


Cholesterol  182 mg/dL (130-200)   07/06/18  18:00    


 


LDL Cholesterol Direct  113 mg/dL (0-129)   07/06/18  18:00    


 


HDL Cholesterol  43 mg/dL (29-60)   07/06/18  18:00    


 


Lipase  60 U/L ()   07/06/18  16:08    


 


Free T4  1.21 ng/dL (0.78-2.19)   07/06/18  18:00    


 


Total T3  1.12 ng/mL (0.97-1.69)   07/06/18  18:00    


 


TSH 3rd Generation  0.24 mIU/mL (0.46-4.68)  L  07/06/18  18:00    


 


Urine Color  Yellow  (YELLOW)   07/06/18  16:15    


 


Urine Appearance  Clear  (CLEAR)   07/06/18  16:15    


 


Urine pH  6.5  (4.7-8.0)   07/06/18  16:15    


 


Ur Specific Gravity  1.020  (1.005-1.035)   07/06/18  16:15    


 


Urine Protein  100 mg/dL (<30 mg/dL)  H  07/06/18  16:15    


 


Urine Glucose (UA)  Negative mg/dL (NEGATIVE)   07/06/18  16:15    


 


Urine Ketones  Negative mg/dL (NEGATIVE)   07/06/18  16:15    


 


Urine Blood  Trace-intact  (NEGATIVE)  H  07/06/18  16:15    


 


Urine Nitrate  Negative  (NEGATIVE)   07/06/18  16:15    


 


Urine Bilirubin  Negative  (NEGATIVE)   07/06/18  16:15    


 


Urine Urobilinogen  0.2 E.U./dL (<1 E.U./dL)   07/06/18  16:15    


 


Ur Leukocyte Esterase  Negative Alaina/uL (NEGATIVE)   07/06/18  16:15    


 


Urine RBC  1 - 3 /hpf (0-2)   07/06/18  16:15    


 


Urine WBC  0 - 2 /hpf (0-6)   07/06/18  16:15    


 


Ur Epithelial Cells  3 - 4 /hpf (0-5)   07/06/18  16:15    


 


Urine Bacteria  Few  (NEG)   07/06/18  16:15    














Attending/Attestation





- Attestation


I have personally seen and examined this patient.: Yes


I have fully participated in the care of the patient.: Yes


I have reviewed all pertinent clinical information, including history, physical 

exam and plan: Yes


Notes (Text): 





07/10/18 14:07





Attending note;





Patient seen and examined with resident.





Denies any chest pain, shortness of breath.


No signs of volume overload noted.





Patient is a 58-year-old female admitted with shortness of breath.





1. Acute on chronic systolic CHF exacerbation. Resolved.


Currently no shortness of breath. Ambulating without difficulty.


Leg swelling improved.


Echo showed left ventricle moderately dilated, left ventricular systolic 

function severely impaired, ejection fraction approximately 20%, severe global 

hypokinesis, moderate pulmonary hypertension. Cardiology evaluation appreciated.


 Patient refusing cardiac cath. 


2. Dysphagia. Patient complaining of chest pressure after eating food. GI 

evaluation appreciated. Continue Protonix.


Follow-up with Dr. White as outpatient.





3. Elevated d-dimer. Patient continues to refuse CTA. Doppler is negative for 

DVT.


Patient is not tachycardic or hypoxic now. Low WElls score.





4. Essential hypertension. Continue Coreg and losartan. Started on digoxin and 

Aldactone.


5. Elevated blood sugar with hemoglobin A1c of 6.6. Started on carbohydrate 

restricted  diet.


 


She will be discharged home today.


Patient wants to follow-up with her primary cardiologist Dr. Luna.





Case was discussed in detail with the patient regarding current diagnosis and 

treatment plan.





Upon discharge the patient will follow-up with PMD Dr. Machado.











07/10/18 14:15

## 2018-07-09 NOTE — CP.PCM.PN
Objective





- Vital Signs/Intake and Output


Vital Signs (last 24 hours): 


 











Temp Pulse Resp BP Pulse Ox


 


 98.0 F   75   20   123/81   95 


 


 07/09/18 05:36  07/09/18 05:36  07/09/18 05:36  07/09/18 05:36  07/09/18 05:36








Intake and Output: 


 











 07/09/18 07/09/18





 06:59 18:59


 


Intake Total 360 


 


Output Total 1200 


 


Balance -840 














- Medications


Medications: 


 Current Medications





Aspirin (Ecotrin)  81 mg PO DAILY [facility]


   Last Admin: 07/08/18 10:14 Dose:  81 mg


Carvedilol (Coreg)  25 mg PO BID [facility]


   Last Admin: 07/08/18 17:24 Dose:  25 mg


Furosemide (Lasix)  40 mg IVP BID [facility]


   Last Admin: 07/08/18 17:24 Dose:  40 mg


Heparin Sodium (Porcine) (Heparin)  5,000 units SC Q8 [facility]


   PRN Reason: Protocol


   Last Admin: 07/09/18 05:26 Dose:  Not Given


Insulin Human Regular (Humulin R Low)  0 units SC ACHS [facility]IM


   PRN Reason: Protocol


   Last Admin: 07/08/18 21:17 Dose:  Not Given


Losartan Potassium (Cozaar)  100 mg PO DAILY [facility]


   Last Admin: 07/08/18 10:14 Dose:  100 mg


Pantoprazole Sodium (Protonix Ec Tab)  40 mg PO 0600 [facility]


   Last Admin: 07/09/18 05:30 Dose:  40 mg


Spironolactone (Aldactone)  25 mg PO DAILY [facility]


   Last Admin: 07/08/18 10:14 Dose:  25 mg











- Labs


Labs: 


 





 07/09/18 05:30 





 07/09/18 05:30 





 











PT  12.1 SECONDS (9.4-12.5)   07/06/18  16:08    


 


INR  1.06  (0.93-1.08)   07/06/18  16:08    


 


APTT  31.1 Seconds (25.1-36.5)   07/06/18  16:08

## 2018-10-04 ENCOUNTER — HOSPITAL ENCOUNTER (INPATIENT)
Dept: HOSPITAL 42 - ED | Age: 58
LOS: 5 days | DRG: 123 | End: 2018-10-09
Attending: INTERNAL MEDICINE | Admitting: INTERNAL MEDICINE
Payer: MEDICAID

## 2018-10-04 VITALS — BODY MASS INDEX: 40.9 KG/M2 | HEART RATE: 72 BPM

## 2018-10-04 DIAGNOSIS — E78.5: ICD-10-CM

## 2018-10-04 DIAGNOSIS — J96.91: ICD-10-CM

## 2018-10-04 DIAGNOSIS — E03.9: ICD-10-CM

## 2018-10-04 DIAGNOSIS — E11.9: ICD-10-CM

## 2018-10-04 DIAGNOSIS — J44.9: ICD-10-CM

## 2018-10-04 DIAGNOSIS — I42.0: ICD-10-CM

## 2018-10-04 DIAGNOSIS — I48.0: ICD-10-CM

## 2018-10-04 DIAGNOSIS — R65.10: ICD-10-CM

## 2018-10-04 DIAGNOSIS — E87.5: ICD-10-CM

## 2018-10-04 DIAGNOSIS — G40.901: ICD-10-CM

## 2018-10-04 DIAGNOSIS — I47.2: ICD-10-CM

## 2018-10-04 DIAGNOSIS — I46.2: ICD-10-CM

## 2018-10-04 DIAGNOSIS — I25.10: ICD-10-CM

## 2018-10-04 DIAGNOSIS — Z90.49: ICD-10-CM

## 2018-10-04 DIAGNOSIS — J69.0: ICD-10-CM

## 2018-10-04 DIAGNOSIS — Z86.73: ICD-10-CM

## 2018-10-04 DIAGNOSIS — Z83.3: ICD-10-CM

## 2018-10-04 DIAGNOSIS — Z87.891: ICD-10-CM

## 2018-10-04 DIAGNOSIS — I21.3: Primary | ICD-10-CM

## 2018-10-04 DIAGNOSIS — J98.11: ICD-10-CM

## 2018-10-04 DIAGNOSIS — I11.0: ICD-10-CM

## 2018-10-04 DIAGNOSIS — I82.432: ICD-10-CM

## 2018-10-04 DIAGNOSIS — E66.01: ICD-10-CM

## 2018-10-04 DIAGNOSIS — I48.2: ICD-10-CM

## 2018-10-04 DIAGNOSIS — G93.1: ICD-10-CM

## 2018-10-04 DIAGNOSIS — Z99.11: ICD-10-CM

## 2018-10-04 DIAGNOSIS — Z86.79: ICD-10-CM

## 2018-10-04 DIAGNOSIS — I50.22: ICD-10-CM

## 2018-10-04 DIAGNOSIS — I49.01: ICD-10-CM

## 2018-10-04 LAB
ALBUMIN SERPL-MCNC: 4.2 G/DL (ref 3–4.8)
ALBUMIN SERPL-MCNC: 4.3 G/DL (ref 3–4.8)
ALBUMIN/GLOB SERPL: 1.2 {RATIO} (ref 1.1–1.8)
ALBUMIN/GLOB SERPL: 1.2 {RATIO} (ref 1.1–1.8)
ALT SERPL-CCNC: 31 U/L (ref 7–56)
ALT SERPL-CCNC: 39 U/L (ref 7–56)
AMORPH SED URNS QL MICRO: (no result)
APPEARANCE UR: CLEAR
APTT BLD: 27.3 SECONDS (ref 25.1–36.5)
ARTERIAL BLOOD GAS HEMOGLOBIN: 14.2 G/DL (ref 11.7–17.4)
ARTERIAL BLOOD GAS O2 SAT: 88 % (ref 95–98)
ARTERIAL BLOOD GAS O2 SAT: 94.5 % (ref 95–98)
ARTERIAL BLOOD GAS O2 SAT: 94.6 % (ref 95–98)
ARTERIAL BLOOD GAS PCO2: 42 MM/HG (ref 35–45)
ARTERIAL BLOOD GAS PCO2: 53 MM/HG (ref 35–45)
ARTERIAL BLOOD GAS PCO2: 55 MM/HG (ref 35–45)
ARTERIAL BLOOD GAS TCO2: 24.7 MMOL.L (ref 22–28)
ARTERIAL BLOOD GAS TCO2: 26.5 MMOL.L (ref 22–28)
ARTERIAL BLOOD GAS TCO2: 27.9 MMOL.L (ref 22–28)
AST SERPL-CCNC: 32 U/L (ref 14–36)
AST SERPL-CCNC: 38 U/L (ref 14–36)
BACTERIA #/AREA URNS HPF: (no result) /[HPF]
BASE EXCESS BLDV CALC-SCNC: 2.8 MMOL/L (ref 0–2)
BASOPHILS # BLD AUTO: 0.01 K/MM3 (ref 0–2)
BASOPHILS # BLD AUTO: 0.02 K/MM3 (ref 0–2)
BASOPHILS NFR BLD: 0.1 % (ref 0–3)
BASOPHILS NFR BLD: 0.2 % (ref 0–3)
BILIRUB UR-MCNC: NEGATIVE MG/DL
BNP SERPL-MCNC: 5280 PG/ML (ref 0–450)
BUN SERPL-MCNC: 21 MG/DL (ref 7–21)
BUN SERPL-MCNC: 22 MG/DL (ref 7–21)
CALCIUM SERPL-MCNC: 9 MG/DL (ref 8.4–10.5)
CALCIUM SERPL-MCNC: 9.1 MG/DL (ref 8.4–10.5)
COLOR UR: YELLOW
EOSINOPHIL # BLD: 0 10*3/UL (ref 0–0.7)
EOSINOPHIL # BLD: 0.1 10*3/UL (ref 0–0.7)
EOSINOPHIL NFR BLD: 0 % (ref 1.5–5)
EOSINOPHIL NFR BLD: 1 % (ref 1.5–5)
ERYTHROCYTE [DISTWIDTH] IN BLOOD BY AUTOMATED COUNT: 14.2 % (ref 11.5–14.5)
ERYTHROCYTE [DISTWIDTH] IN BLOOD BY AUTOMATED COUNT: 14.2 % (ref 11.5–14.5)
GFR NON-AFRICAN AMERICAN: > 60
GFR NON-AFRICAN AMERICAN: > 60
GLUCOSE UR STRIP-MCNC: 100 MG/DL
GRANULOCYTES # BLD: 13.5 10*3/UL (ref 1.4–6.5)
GRANULOCYTES # BLD: 7.57 10*3/UL (ref 1.4–6.5)
GRANULOCYTES NFR BLD: 65.1 % (ref 50–68)
GRANULOCYTES NFR BLD: 87.3 % (ref 50–68)
HCO3 BLDA-SCNC: 23 MMOL/L (ref 21–28)
HCO3 BLDA-SCNC: 24.9 MMOL/L (ref 21–28)
HCO3 BLDA-SCNC: 26.6 MMOL/L (ref 21–28)
HGB BLD-MCNC: 14.3 G/DL (ref 12–16)
HGB BLD-MCNC: 15.3 G/DL (ref 12–16)
HGB OTHER MFR BLD ELPH: (no result) /HPF (ref 0–2)
INHALED O2 CONCENTRATION: 100 %
INHALED O2 CONCENTRATION: 50 %
INHALED O2 CONCENTRATION: 80 %
INR PPP: 1.1
LEUKOCYTE ESTERASE UR-ACNC: NEGATIVE LEU/UL
LYMPHOCYTES # BLD: 0.9 10*3/UL (ref 1.2–3.4)
LYMPHOCYTES # BLD: 3.3 10*3/UL (ref 1.2–3.4)
LYMPHOCYTES NFR BLD AUTO: 28 % (ref 22–35)
LYMPHOCYTES NFR BLD AUTO: 5.6 % (ref 22–35)
MCH RBC QN AUTO: 30.1 PG (ref 25–35)
MCH RBC QN AUTO: 30.4 PG (ref 25–35)
MCHC RBC AUTO-ENTMCNC: 32.1 G/DL (ref 31–37)
MCHC RBC AUTO-ENTMCNC: 33.6 G/DL (ref 31–37)
MCV RBC AUTO: 90.4 FL (ref 80–105)
MCV RBC AUTO: 93.9 FL (ref 80–105)
MONOCYTES # BLD AUTO: 0.7 10*3/UL (ref 0.1–0.6)
MONOCYTES # BLD AUTO: 1.1 10*3/UL (ref 0.1–0.6)
MONOCYTES NFR BLD: 5.7 % (ref 1–6)
MONOCYTES NFR BLD: 7 % (ref 1–6)
O2 CAP BLDA-SCNC: 19.1 ML/DL (ref 16–24)
O2 CT BLDA-SCNC: 16.8 ML/DL (ref 15–23)
PH BLDA: 7.23 [PH] (ref 7.35–7.45)
PH BLDA: 7.28 [PH] (ref 7.35–7.45)
PH BLDA: 7.41 [PH] (ref 7.35–7.45)
PH BLDV: 7.39 [PH] (ref 7.32–7.43)
PH UR STRIP: 6 [PH] (ref 4.7–8)
PLATELET # BLD: 213 10^3/UL (ref 120–450)
PLATELET # BLD: 99 10^3/UL (ref 120–450)
PMV BLD AUTO: 12.3 FL (ref 7–11)
PMV BLD AUTO: 12.8 FL (ref 7–11)
PO2 BLDA: 53 MM/HG (ref 80–100)
PO2 BLDA: 62 MM/HG (ref 80–100)
PO2 BLDA: 66 MM/HG (ref 80–100)
PROT UR STRIP-MCNC: >=300 MG/DL
PROTHROMBIN TIME: 12.7 SECONDS (ref 9.4–12.5)
RBC # BLD AUTO: 4.7 10^6/UL (ref 3.5–6.1)
RBC # BLD AUTO: 5.08 10^6/UL (ref 3.5–6.1)
RBC # UR STRIP: (no result) /UL
RBC #/AREA URNS HPF: (no result) /HPF (ref 0–2)
SP GR UR STRIP: 1.02 (ref 1–1.03)
TROPONIN I SERPL-MCNC: 0.05 NG/ML
TROPONIN I SERPL-MCNC: 0.08 NG/ML
URINE FINE GRANULAR CAST: (no result) /HPF (ref 0–2)
URINE HYALINE CAST: (no result) /HPF
UROBILINOGEN UR STRIP-ACNC: 0.2 E.U./DL
VENOUS BLOOD FIO2: 21 %
VENOUS BLOOD GAS PCO2: 47 (ref 40–60)
VENOUS BLOOD GAS PO2: 52 MM/HG (ref 30–55)
WBC # BLD AUTO: 11.6 10^3/UL (ref 4.5–11)
WBC # BLD AUTO: 15.5 10^3/UL (ref 4.5–11)

## 2018-10-04 PROCEDURE — B2111ZZ FLUOROSCOPY OF MULTIPLE CORONARY ARTERIES USING LOW OSMOLAR CONTRAST: ICD-10-PCS

## 2018-10-04 PROCEDURE — 5A1955Z RESPIRATORY VENTILATION, GREATER THAN 96 CONSECUTIVE HOURS: ICD-10-PCS

## 2018-10-04 PROCEDURE — 0BH17EZ INSERTION OF ENDOTRACHEAL AIRWAY INTO TRACHEA, VIA NATURAL OR ARTIFICIAL OPENING: ICD-10-PCS

## 2018-10-04 PROCEDURE — 4A023N8 MEASUREMENT OF CARDIAC SAMPLING AND PRESSURE, BILATERAL, PERCUTANEOUS APPROACH: ICD-10-PCS

## 2018-10-04 PROCEDURE — B2151ZZ FLUOROSCOPY OF LEFT HEART USING LOW OSMOLAR CONTRAST: ICD-10-PCS

## 2018-10-04 RX ADMIN — PROPOFOL PRN MLS/HR: 10 INJECTION, EMULSION INTRAVENOUS at 11:44

## 2018-10-04 RX ADMIN — PROPOFOL PRN MLS/HR: 10 INJECTION, EMULSION INTRAVENOUS at 21:46

## 2018-10-04 RX ADMIN — INSULIN HUMAN SCH: 100 INJECTION, SOLUTION PARENTERAL at 19:15

## 2018-10-04 RX ADMIN — LEVETIRACETAM SCH MLS/HR: 5 INJECTION INTRAVENOUS at 21:51

## 2018-10-04 RX ADMIN — DOBUTAMINE HYDROCHLORIDE PRN MLS/HR: 200 INJECTION INTRAVENOUS at 11:43

## 2018-10-04 RX ADMIN — LEVETIRACETAM SCH MLS/HR: 5 INJECTION INTRAVENOUS at 11:42

## 2018-10-04 RX ADMIN — INSULIN HUMAN SCH UNIT: 100 INJECTION, SOLUTION PARENTERAL at 13:55

## 2018-10-04 RX ADMIN — INSULIN HUMAN SCH: 100 INJECTION, SOLUTION PARENTERAL at 21:51

## 2018-10-04 RX ADMIN — METRONIDAZOLE SCH MLS/HR: 500 INJECTION, SOLUTION INTRAVENOUS at 22:03

## 2018-10-04 NOTE — CP.PCM.HP
Addendum entered and electronically signed by SalvadorMitziDO  10/04/18 

16:46: 


Under plan Flagyl was discontinued


Heparin currently on hold


Valsartan Dc'ed; Cozaar intitaited


Nimbex started








Original Note:








<Mitzi Franco - Last Filed: 10/04/18 15:21>





History of Present Illness





- History of Present Illness


History of Present Illness: 





PGY-1 H&P for Dr. Prajapati's service





HPI: Patient is a 59 yo female with PMH of systolic CHF, HTN, DM, COPD, AFib who

presents to hospital s/p cardiac arrest. Patient currently sedated/intubated 

making information difficult to obtain. Most information was collected by chart 

review. Patient was at home when she experienced chest pain and called EMS. By 

the time of arrival of EMS patient was noted to be pulseless and diaphoretic. 

Patient was found to have PEA. Patient was given 4 aspirins and CPR was started.

ROSC was obtained after 2 minutes of chest compressions and patient was intubat

ed in the field. No epinephrine was obtained. 12 point ROS unable to collect as 

patient is sedated/intubated s/p cardiac arrest. 





PMH: HTN, DM, hypothyroidism, chronic systolic CHF, dilated cardiomyopathy, and 

TIA


Surg: C-sections x4, appendectomy, tonsillectomy


All: NKDA


SH: Former smoker (3 cig/day for 15 yrs), quit 6 years ago; Denied EtOH or 

illicit drug use


FHx: Mother, , h/o DM; Father, unknown


Medications: Lasix 20 mg daily, Venlafaxine 37.5 daily, Ambien 5 mg HS, 

Valsartan 160 mg daily, Coreg 25 mg daily





PMD: Carter


Cardio: Andrewadi





Present on Admission





- Present on Admission


Any Indicators Present on Admission: No





Review of Systems





- Review of Systems


Review of Systems: 





12 point ROS limited as patient sedated/intubated





Past Patient History





- Infectious Disease


Hx of Infectious Diseases: None





- Tetanus Immunizations


Tetanus Immunization: Unknown





- Past Social History


Smoking Status: Former Smoker





- CARDIAC


Hx Congestive Heart Failure: Yes


Hx Hypertension: Yes





- PULMONARY


Hx Respiratory Disorders: Yes (USED TO SMOKE CIGARETTES 3 CIG A DAY FOR 15 YRS.)





- NEUROLOGICAL


HX Cerebrovascular Accident: Yes (TIA)





- HEENT


Hx HEENT Problems: No





- RENAL


Hx Chronic Kidney Disease: No





- ENDOCRINE/METABOLIC


Hx Diabetes Mellitus Type 2: Yes


Hx Hypothyroidism: Yes





- HEMATOLOGICAL/ONCOLOGICAL


Hx Blood Disorders: No





- INTEGUMENTARY


Hx Dermatological Problems: No





- MUSCULOSKELETAL/RHEUMATOLOGICAL


Hx Arthritis: Yes





- GASTROINTESTINAL


Hx Gastrointestinal Disorders: Yes (APPENDECTOMY,TONSILLECTOMY)


Hx Gall Bladder Disease: Yes (CHOLEYCYSTECTOMY)





- GENITOURINARY/GYNECOLOGICAL


Hx Genitourinary Disorders: No





- PSYCHIATRIC


Hx Psychophysiologic Disorder: Yes


Hx Anxiety: Yes


Hx Depression: Yes


Hx Substance Use: No





- SURGICAL HISTORY


Hx Appendectomy: Yes


Hx Cholecystectomy: Yes





Meds


Allergies/Adverse Reactions: 


                                    Allergies











Allergy/AdvReac Type Severity Reaction Status Date / Time


 


No Known Allergies Allergy   Verified 10/04/18 14:47














Physical Exam





- Constitutional


Appears: Non-toxic, No Acute Distress





- Head Exam


Head Exam: NORMAL INSPECTION, NORMOCEPHALIC





- Eye Exam


Eye Exam: EOMI, Normal appearance.  absent: Nystagmus, Scleral icterus





- ENT Exam


ENT Exam: Mucous Membranes Moist





- Respiratory Exam


Respiratory Exam: Clear to Auscultation Bilateral, NORMAL BREATHING PATTERN.  

absent: Rales, Rhonchi, Wheezes





- Cardiovascular Exam


Cardiovascular Exam: REGULAR RHYTHM, +S1, +S2





- GI/Abdominal Exam


GI & Abdominal Exam: Normal Bowel Sounds.  absent: Firm, Rigid





- Extremities Exam


Extremities exam: Positive for: normal inspection, pedal edema





- Neurological Exam


Additional comments: 





patient sedated and intubated


slight gag reflex








- Skin


Skin Exam: Intact, Normal Color





Results





- Vital Signs


Recent Vital Signs: 





                                Last Vital Signs











Temp  98.6 F   10/04/18 09:20


 


Pulse  88   10/04/18 12:30


 


Resp  20   10/04/18 12:30


 


BP  149/108 H  10/04/18 12:30


 


Pulse Ox  98   10/04/18 12:30














- Labs


Result Diagrams: 


                                 10/04/18 09:15





                                 10/04/18 09:15


Labs: 





                         Laboratory Results - last 24 hr











  10/04/18 10/04/18 10/04/18





  09:15 09:15 09:15


 


WBC  11.6 H D  


 


RBC  5.08  


 


Hgb  15.3  


 


Hct  47.7  


 


MCV  93.9  D  


 


MCH  30.1  


 


MCHC  32.1  


 


RDW  14.2  


 


Plt Count  213  


 


MPV  12.3 H  


 


Gran %  65.1  


 


Lymph % (Auto)  28.0  


 


Mono % (Auto)  5.7  


 


Eos % (Auto)  1.0 L  


 


Baso % (Auto)  0.2  


 


Gran #  7.57 H  


 


Lymph # (Auto)  3.3  


 


Mono # (Auto)  0.7 H  


 


Eos # (Auto)  0.1  


 


Baso # (Auto)  0.02  


 


PT   12.7 H 


 


INR   1.10 


 


APTT   27.3 


 


pCO2   


 


pO2   


 


HCO3   


 


ABG pH   


 


ABG Total CO2   


 


ABG O2 Saturation   


 


ABG O2 Content   


 


ABG Base Excess   


 


ABG Hemoglobin   


 


ABG Carboxyhemoglobin   


 


POC ABG HHb (Measured)   


 


ABG Methemoglobin   


 


ABG O2 Capacity   


 


ABG Potassium   


 


Hgb O2 Saturation   


 


Glucose   


 


Lactate   


 


Mechanical Rate   


 


FiO2   


 


Tidal Volume   


 


PEEP   


 


Sodium    139


 


Potassium    3.9


 


Chloride    99


 


Carbon Dioxide    24


 


Anion Gap    19


 


BUN    21


 


Creatinine    0.9


 


Est GFR ( Amer)    > 60


 


Est GFR (Non-Af Amer)    > 60


 


Random Glucose    291 H


 


Calcium    9.1


 


Total Bilirubin    0.7


 


AST    32


 


ALT    31


 


Alkaline Phosphatase    79


 


Troponin I    0.05


 


NT-Pro-B Natriuret Pep    5280 H


 


Total Protein    7.8


 


Albumin    4.3


 


Globulin    3.5


 


Albumin/Globulin Ratio    1.2


 


Arterial Blood Potassium   


 


Urine Color   


 


Urine Appearance   


 


Urine pH   


 


Ur Specific Gravity   


 


Urine Protein   


 


Urine Glucose (UA)   


 


Urine Ketones   


 


Urine Blood   


 


Urine Nitrate   


 


Urine Bilirubin   


 


Urine Urobilinogen   


 


Ur Leukocyte Esterase   


 


Urine RBC   


 


Urine WBC   


 


Ur Epithelial Cells   


 


Amorphous Sediment   


 


Urine Bacteria   


 


Hyaline Casts   


 


Fine Granular Casts   


 


Coarse Granular Casts   


 


Urine Other   














  10/04/18 10/04/18 10/04/18





  09:16 11:21 12:02


 


WBC   


 


RBC   


 


Hgb   


 


Hct   


 


MCV   


 


MCH   


 


MCHC   


 


RDW   


 


Plt Count   


 


MPV   


 


Gran %   


 


Lymph % (Auto)   


 


Mono % (Auto)   


 


Eos % (Auto)   


 


Baso % (Auto)   


 


Gran #   


 


Lymph # (Auto)   


 


Mono # (Auto)   


 


Eos # (Auto)   


 


Baso # (Auto)   


 


PT   


 


INR   


 


APTT   


 


pCO2  55 H   53 H


 


pO2  53.0 L   66.0 L


 


HCO3  23.0   24.9


 


ABG pH  7.23 L   7.28 L


 


ABG Total CO2  24.7   26.5


 


ABG O2 Saturation  88.0 L   94.6 L


 


ABG O2 Content  16.8  


 


ABG Base Excess  -5.3 L   -2.6 L


 


ABG Hemoglobin  14.2  


 


ABG Carboxyhemoglobin  3.4 H  


 


POC ABG HHb (Measured)  11.5 H  


 


ABG Methemoglobin  0.5  


 


ABG O2 Capacity  19.1  


 


ABG Potassium    4.2


 


Hgb O2 Saturation  84.5 L  


 


Glucose    284 H


 


Lactate    2.2 H


 


Mechanical Rate    20


 


FiO2  50.0   100.0


 


Tidal Volume    400


 


PEEP    5


 


Sodium    134.0


 


Potassium   


 


Chloride    101.0


 


Carbon Dioxide   


 


Anion Gap   


 


BUN   


 


Creatinine   


 


Est GFR ( Amer)   


 


Est GFR (Non-Af Amer)   


 


Random Glucose   


 


Calcium   


 


Total Bilirubin   


 


AST   


 


ALT   


 


Alkaline Phosphatase   


 


Troponin I   


 


NT-Pro-B Natriuret Pep   


 


Total Protein   


 


Albumin   


 


Globulin   


 


Albumin/Globulin Ratio   


 


Arterial Blood Potassium    4.2


 


Urine Color   Yellow 


 


Urine Appearance   Clear 


 


Urine pH   6.0 


 


Ur Specific Gravity   1.020 


 


Urine Protein   >=300 H 


 


Urine Glucose (UA)   100 H 


 


Urine Ketones   Negative 


 


Urine Blood   Moderate H 


 


Urine Nitrate   Negative 


 


Urine Bilirubin   Negative 


 


Urine Urobilinogen   0.2 


 


Ur Leukocyte Esterase   Negative 


 


Urine RBC   20 - 25 


 


Urine WBC   1 - 3 


 


Ur Epithelial Cells   6 - 8 


 


Amorphous Sediment   Few 


 


Urine Bacteria   Many 


 


Hyaline Casts   0 - 2 


 


Fine Granular Casts   0 - 2 


 


Coarse Granular Casts   Trace H 


 


Urine Other   Uyeast 














Assessment & Plan





- Assessment and Plan (Free Text)


Assessment: 





Patient is a 59 yo female with PMH of systolic CHF, HTN, DM, COPD, AFib who 

presents to hospital s/p cardiac arrest. Patient currently intubated/sedated. 

Cardiac cath pending


Plan: 





Cardiac Arrest


Cardio Consult- Dr. Peck- recommendations appreciated


Neuro Consult- Dr. Man- Video EEG am; MRI brain; Aspirin 325mg rectally; likely 

anoxic brain injury


ICU consult- Dr. Danielson- Keppra 500mg


EKG- sinus tachycardia with T wave inversions


CT head no acute findings


Cardiac Cath pending


Dobutamine; Heparin drip


Patient intubated with ET tube; NG tube; Propofol drip; ABG shows primary 

respiratory acidosis


Aspirin 81 NG


NS @ 100mls/hr


Lipitor 80mg 


Dig level pending; Echo pending





Cholecystitis


ID consulted- Dr. Gleason- recommendations appreciated


CT abd/pelvis- Possible cholecystitis


Cxray- severe cardiomegaly, layering pleural effusions/pulmonary edema in the 

right lung


Bedside U/S pending


Zosyn and Flagyl





Aspiration Pneumonia


ID consulted- recs appreciated


Zosyn and Flagyl


PNA studies pending


Bcx, Ucx, Sputum cx pending


Leukocytosis noted; Repeat CMP in AM





HTN


Coreg 25mg NG daily


Valsartan 160mg NG daily





DM2


ISS low dose


ACHS





Hx of COPD


Patient currently vented


RR- 16; TV- 400; Peep- 5; FiO2- 50





PPx


GI ppx- Protonix 40mg IVP daily


DVT ppx- Heparin Drip














<Booker Prajapati - Last Filed: 10/05/18 07:46>





Results





- Vital Signs


Recent Vital Signs: 





                                Last Vital Signs











Temp  92.8 F L  10/05/18 06:18


 


Pulse  58 L  10/05/18 06:18


 


Resp  20   10/05/18 06:54


 


BP  181/112 H  10/05/18 06:16


 


Pulse Ox  100   10/05/18 06:54














- Labs


Result Diagrams: 


                                 10/05/18 05:30





                                 10/05/18 00:05


Labs: 





                         Laboratory Results - last 24 hr











  10/04/18 10/04/18 10/04/18





  09:15 09:15 09:15


 


WBC  11.6 H D  


 


RBC  5.08  


 


Hgb  15.3  


 


Hct  47.7  


 


MCV  93.9  D  


 


MCH  30.1  


 


MCHC  32.1  


 


RDW  14.2  


 


Plt Count  213  


 


MPV  12.3 H  


 


Gran %  65.1  


 


Lymph % (Auto)  28.0  


 


Mono % (Auto)  5.7  


 


Eos % (Auto)  1.0 L  


 


Baso % (Auto)  0.2  


 


Gran #  7.57 H  


 


Lymph # (Auto)  3.3  


 


Mono # (Auto)  0.7 H  


 


Eos # (Auto)  0.1  


 


Baso # (Auto)  0.02  


 


PT   12.7 H 


 


INR   1.10 


 


APTT   27.3 


 


pCO2   


 


pO2   


 


HCO3   


 


ABG pH   


 


ABG Total CO2   


 


ABG O2 Saturation   


 


ABG O2 Content   


 


ABG Base Excess   


 


ABG Hemoglobin   


 


ABG Carboxyhemoglobin   


 


POC ABG HHb (Measured)   


 


ABG Methemoglobin   


 


ABG O2 Capacity   


 


ABG Potassium   


 


VBG pH   


 


VBG pCO2   


 


VBG HCO3   


 


VBG Total CO2   


 


VBG O2 Sat (Calc)   


 


VBG Base Excess   


 


VBG Potassium   


 


Hgb O2 Saturation   


 


Glucose   


 


Lactate   


 


Mechanical Rate   


 


FiO2   


 


Tidal Volume   


 


PEEP   


 


Sodium    139


 


Potassium    3.9


 


Chloride    99


 


Carbon Dioxide    24


 


Anion Gap    19


 


BUN    21


 


Creatinine    0.9


 


Est GFR ( Amer)    > 60


 


Est GFR (Non-Af Amer)    > 60


 


POC Glucose (mg/dL)   


 


Random Glucose    291 H


 


Calcium    9.1


 


Phosphorus   


 


Magnesium   


 


Total Bilirubin    0.7


 


AST    32


 


ALT    31


 


Alkaline Phosphatase    79


 


Troponin I    0.05


 


NT-Pro-B Natriuret Pep    5280 H


 


Total Protein    7.8


 


Albumin    4.3


 


Globulin    3.5


 


Albumin/Globulin Ratio    1.2


 


Procalcitonin   


 


Arterial Blood Potassium   


 


Venous Blood Potassium   


 


Urine Color   


 


Urine Appearance   


 


Urine pH   


 


Ur Specific Gravity   


 


Urine Protein   


 


Urine Glucose (UA)   


 


Urine Ketones   


 


Urine Blood   


 


Urine Nitrate   


 


Urine Bilirubin   


 


Urine Urobilinogen   


 


Ur Leukocyte Esterase   


 


Urine RBC   


 


Urine WBC   


 


Ur Epithelial Cells   


 


Amorphous Sediment   


 


Urine Bacteria   


 


Hyaline Casts   


 


Fine Granular Casts   


 


Coarse Granular Casts   


 


Urine Other   


 


Digoxin   














  10/04/18 10/04/18 10/04/18





  09:16 11:21 12:02


 


WBC   


 


RBC   


 


Hgb   


 


Hct   


 


MCV   


 


MCH   


 


MCHC   


 


RDW   


 


Plt Count   


 


MPV   


 


Gran %   


 


Lymph % (Auto)   


 


Mono % (Auto)   


 


Eos % (Auto)   


 


Baso % (Auto)   


 


Gran #   


 


Lymph # (Auto)   


 


Mono # (Auto)   


 


Eos # (Auto)   


 


Baso # (Auto)   


 


PT   


 


INR   


 


APTT   


 


pCO2  55 H   53 H


 


pO2  53.0 L   66.0 L


 


HCO3  23.0   24.9


 


ABG pH  7.23 L   7.28 L


 


ABG Total CO2  24.7   26.5


 


ABG O2 Saturation  88.0 L   94.6 L


 


ABG O2 Content  16.8  


 


ABG Base Excess  -5.3 L   -2.6 L


 


ABG Hemoglobin  14.2  


 


ABG Carboxyhemoglobin  3.4 H  


 


POC ABG HHb (Measured)  11.5 H  


 


ABG Methemoglobin  0.5  


 


ABG O2 Capacity  19.1  


 


ABG Potassium    4.2


 


VBG pH   


 


VBG pCO2   


 


VBG HCO3   


 


VBG Total CO2   


 


VBG O2 Sat (Calc)   


 


VBG Base Excess   


 


VBG Potassium   


 


Hgb O2 Saturation  84.5 L  


 


Glucose    284 H


 


Lactate    2.2 H


 


Mechanical Rate    20


 


FiO2  50.0   100.0


 


Tidal Volume    400


 


PEEP    5


 


Sodium    134.0


 


Potassium   


 


Chloride    101.0


 


Carbon Dioxide   


 


Anion Gap   


 


BUN   


 


Creatinine   


 


Est GFR ( Amer)   


 


Est GFR (Non-Af Amer)   


 


POC Glucose (mg/dL)   


 


Random Glucose   


 


Calcium   


 


Phosphorus   


 


Magnesium   


 


Total Bilirubin   


 


AST   


 


ALT   


 


Alkaline Phosphatase   


 


Troponin I   


 


NT-Pro-B Natriuret Pep   


 


Total Protein   


 


Albumin   


 


Globulin   


 


Albumin/Globulin Ratio   


 


Procalcitonin   


 


Arterial Blood Potassium    4.2


 


Venous Blood Potassium   


 


Urine Color   Yellow 


 


Urine Appearance   Clear 


 


Urine pH   6.0 


 


Ur Specific Gravity   1.020 


 


Urine Protein   >=300 H 


 


Urine Glucose (UA)   100 H 


 


Urine Ketones   Negative 


 


Urine Blood   Moderate H 


 


Urine Nitrate   Negative 


 


Urine Bilirubin   Negative 


 


Urine Urobilinogen   0.2 


 


Ur Leukocyte Esterase   Negative 


 


Urine RBC   20 - 25 


 


Urine WBC   1 - 3 


 


Ur Epithelial Cells   6 - 8 


 


Amorphous Sediment   Few 


 


Urine Bacteria   Many 


 


Hyaline Casts   0 - 2 


 


Fine Granular Casts   0 - 2 


 


Coarse Granular Casts   Trace H 


 


Urine Other   Uyeast 


 


Digoxin   














  10/04/18 10/04/18 10/04/18





  13:33 16:15 16:50


 


WBC   


 


RBC   


 


Hgb   


 


Hct   


 


MCV   


 


MCH   


 


MCHC   


 


RDW   


 


Plt Count   


 


MPV   


 


Gran %   


 


Lymph % (Auto)   


 


Mono % (Auto)   


 


Eos % (Auto)   


 


Baso % (Auto)   


 


Gran #   


 


Lymph # (Auto)   


 


Mono # (Auto)   


 


Eos # (Auto)   


 


Baso # (Auto)   


 


PT   


 


INR   


 


APTT   


 


pCO2   42 


 


pO2   62.0 L 


 


HCO3   26.6 


 


ABG pH   7.41 


 


ABG Total CO2   27.9 


 


ABG O2 Saturation   94.5 L 


 


ABG O2 Content   


 


ABG Base Excess   1.7 


 


ABG Hemoglobin   


 


ABG Carboxyhemoglobin   


 


POC ABG HHb (Measured)   


 


ABG Methemoglobin   


 


ABG O2 Capacity   


 


ABG Potassium   3.3 L 


 


VBG pH   


 


VBG pCO2   


 


VBG HCO3   


 


VBG Total CO2   


 


VBG O2 Sat (Calc)   


 


VBG Base Excess   


 


VBG Potassium   


 


Hgb O2 Saturation   


 


Glucose   134 H 


 


Lactate   1.4 


 


Mechanical Rate   30 


 


FiO2   80.0 


 


Tidal Volume   400 


 


PEEP   10 


 


Sodium   137.0 


 


Potassium   


 


Chloride   103.0 


 


Carbon Dioxide   


 


Anion Gap   


 


BUN   


 


Creatinine   


 


Est GFR ( Amer)   


 


Est GFR (Non-Af Amer)   


 


POC Glucose (mg/dL)  184 H  


 


Random Glucose   


 


Calcium   


 


Phosphorus   


 


Magnesium   


 


Total Bilirubin   


 


AST   


 


ALT   


 


Alkaline Phosphatase   


 


Troponin I   


 


NT-Pro-B Natriuret Pep   


 


Total Protein   


 


Albumin   


 


Globulin   


 


Albumin/Globulin Ratio   


 


Procalcitonin    0.22


 


Arterial Blood Potassium   3.3 L 


 


Venous Blood Potassium   


 


Urine Color   


 


Urine Appearance   


 


Urine pH   


 


Ur Specific Gravity   


 


Urine Protein   


 


Urine Glucose (UA)   


 


Urine Ketones   


 


Urine Blood   


 


Urine Nitrate   


 


Urine Bilirubin   


 


Urine Urobilinogen   


 


Ur Leukocyte Esterase   


 


Urine RBC   


 


Urine WBC   


 


Ur Epithelial Cells   


 


Amorphous Sediment   


 


Urine Bacteria   


 


Hyaline Casts   


 


Fine Granular Casts   


 


Coarse Granular Casts   


 


Urine Other   


 


Digoxin   














  10/04/18 10/04/18 10/04/18





  16:50 16:50 16:50


 


WBC   


 


RBC   


 


Hgb   


 


Hct   


 


MCV   


 


MCH   


 


MCHC   


 


RDW   


 


Plt Count   


 


MPV   


 


Gran %   


 


Lymph % (Auto)   


 


Mono % (Auto)   


 


Eos % (Auto)   


 


Baso % (Auto)   


 


Gran #   


 


Lymph # (Auto)   


 


Mono # (Auto)   


 


Eos # (Auto)   


 


Baso # (Auto)   


 


PT   


 


INR   


 


APTT   


 


pCO2   


 


pO2  52  


 


HCO3   


 


ABG pH   


 


ABG Total CO2   


 


ABG O2 Saturation   


 


ABG O2 Content   


 


ABG Base Excess   


 


ABG Hemoglobin   


 


ABG Carboxyhemoglobin   


 


POC ABG HHb (Measured)   


 


ABG Methemoglobin   


 


ABG O2 Capacity   


 


ABG Potassium   


 


VBG pH  7.39  


 


VBG pCO2  47.0  


 


VBG HCO3  28.5 H  


 


VBG Total CO2  29.9 H  


 


VBG O2 Sat (Calc)  89.4 H  


 


VBG Base Excess  2.8 H  


 


VBG Potassium  3.6  


 


Hgb O2 Saturation   


 


Glucose  136 H  


 


Lactate  1.6  


 


Mechanical Rate   


 


FiO2  21.0  


 


Tidal Volume   


 


PEEP   


 


Sodium  138.0   138


 


Potassium    3.7


 


Chloride  101.0   101


 


Carbon Dioxide    26


 


Anion Gap    15


 


BUN    22 H


 


Creatinine    0.9


 


Est GFR ( Amer)    > 60


 


Est GFR (Non-Af Amer)    > 60


 


POC Glucose (mg/dL)   


 


Random Glucose    138 H


 


Calcium    9.0


 


Phosphorus    3.6


 


Magnesium    1.9


 


Total Bilirubin    0.6


 


AST    38 H


 


ALT    39


 


Alkaline Phosphatase    69


 


Troponin I    0.08  D


 


NT-Pro-B Natriuret Pep   


 


Total Protein    7.5


 


Albumin    4.2


 


Globulin    3.3


 


Albumin/Globulin Ratio    1.2


 


Procalcitonin   


 


Arterial Blood Potassium   


 


Venous Blood Potassium  3.6  


 


Urine Color   


 


Urine Appearance   


 


Urine pH   


 


Ur Specific Gravity   


 


Urine Protein   


 


Urine Glucose (UA)   


 


Urine Ketones   


 


Urine Blood   


 


Urine Nitrate   


 


Urine Bilirubin   


 


Urine Urobilinogen   


 


Ur Leukocyte Esterase   


 


Urine RBC   


 


Urine WBC   


 


Ur Epithelial Cells   


 


Amorphous Sediment   


 


Urine Bacteria   


 


Hyaline Casts   


 


Fine Granular Casts   


 


Coarse Granular Casts   


 


Urine Other   


 


Digoxin   < 0.4 L 














  10/04/18 10/04/18 10/04/18





  17:32 21:21 22:40


 


WBC    15.5 H D


 


RBC    4.70


 


Hgb    14.3


 


Hct    42.5


 


MCV    90.4  D


 


MCH    30.4


 


MCHC    33.6


 


RDW    14.2


 


Plt Count    99 L


 


MPV    12.8 H


 


Gran %    87.3 H


 


Lymph % (Auto)    5.6 L


 


Mono % (Auto)    7.0 H


 


Eos % (Auto)    0.0 L


 


Baso % (Auto)    0.1


 


Gran #    13.50 H


 


Lymph # (Auto)    0.9 L


 


Mono # (Auto)    1.1 H


 


Eos # (Auto)    0.0


 


Baso # (Auto)    0.01


 


PT   


 


INR   


 


APTT   


 


pCO2   


 


pO2   


 


HCO3   


 


ABG pH   


 


ABG Total CO2   


 


ABG O2 Saturation   


 


ABG O2 Content   


 


ABG Base Excess   


 


ABG Hemoglobin   


 


ABG Carboxyhemoglobin   


 


POC ABG HHb (Measured)   


 


ABG Methemoglobin   


 


ABG O2 Capacity   


 


ABG Potassium   


 


VBG pH   


 


VBG pCO2   


 


VBG HCO3   


 


VBG Total CO2   


 


VBG O2 Sat (Calc)   


 


VBG Base Excess   


 


VBG Potassium   


 


Hgb O2 Saturation   


 


Glucose   


 


Lactate   


 


Mechanical Rate   


 


FiO2   


 


Tidal Volume   


 


PEEP   


 


Sodium   


 


Potassium   


 


Chloride   


 


Carbon Dioxide   


 


Anion Gap   


 


BUN   


 


Creatinine   


 


Est GFR ( Amer)   


 


Est GFR (Non-Af Amer)   


 


POC Glucose (mg/dL)  130 H  135 H 


 


Random Glucose   


 


Calcium   


 


Phosphorus   


 


Magnesium   


 


Total Bilirubin   


 


AST   


 


ALT   


 


Alkaline Phosphatase   


 


Troponin I   


 


NT-Pro-B Natriuret Pep   


 


Total Protein   


 


Albumin   


 


Globulin   


 


Albumin/Globulin Ratio   


 


Procalcitonin   


 


Arterial Blood Potassium   


 


Venous Blood Potassium   


 


Urine Color   


 


Urine Appearance   


 


Urine pH   


 


Ur Specific Gravity   


 


Urine Protein   


 


Urine Glucose (UA)   


 


Urine Ketones   


 


Urine Blood   


 


Urine Nitrate   


 


Urine Bilirubin   


 


Urine Urobilinogen   


 


Ur Leukocyte Esterase   


 


Urine RBC   


 


Urine WBC   


 


Ur Epithelial Cells   


 


Amorphous Sediment   


 


Urine Bacteria   


 


Hyaline Casts   


 


Fine Granular Casts   


 


Coarse Granular Casts   


 


Urine Other   


 


Digoxin   














  10/04/18 10/05/18 10/05/18





  23:22 00:05 03:37


 


WBC   


 


RBC   


 


Hgb   


 


Hct   


 


MCV   


 


MCH   


 


MCHC   


 


RDW   


 


Plt Count   


 


MPV   


 


Gran %   


 


Lymph % (Auto)   


 


Mono % (Auto)   


 


Eos % (Auto)   


 


Baso % (Auto)   


 


Gran #   


 


Lymph # (Auto)   


 


Mono # (Auto)   


 


Eos # (Auto)   


 


Baso # (Auto)   


 


PT   


 


INR   


 


APTT   


 


pCO2   


 


pO2   


 


HCO3   


 


ABG pH   


 


ABG Total CO2   


 


ABG O2 Saturation   


 


ABG O2 Content   


 


ABG Base Excess   


 


ABG Hemoglobin   


 


ABG Carboxyhemoglobin   


 


POC ABG HHb (Measured)   


 


ABG Methemoglobin   


 


ABG O2 Capacity   


 


ABG Potassium   


 


VBG pH   


 


VBG pCO2   


 


VBG HCO3   


 


VBG Total CO2   


 


VBG O2 Sat (Calc)   


 


VBG Base Excess   


 


VBG Potassium   


 


Hgb O2 Saturation   


 


Glucose   


 


Lactate   


 


Mechanical Rate   


 


FiO2   


 


Tidal Volume   


 


PEEP   


 


Sodium   137 


 


Potassium   3.8 


 


Chloride   97 L 


 


Carbon Dioxide   29 


 


Anion Gap   15 


 


BUN   20 


 


Creatinine   0.8 


 


Est GFR ( Amer)   > 60 


 


Est GFR (Non-Af Amer)   > 60 


 


POC Glucose (mg/dL)  150 H   132 H


 


Random Glucose   134 H 


 


Calcium   8.8 


 


Phosphorus   4.6 H 


 


Magnesium   1.8 


 


Total Bilirubin   


 


AST   


 


ALT   


 


Alkaline Phosphatase   


 


Troponin I   


 


NT-Pro-B Natriuret Pep   


 


Total Protein   


 


Albumin   


 


Globulin   


 


Albumin/Globulin Ratio   


 


Procalcitonin   


 


Arterial Blood Potassium   


 


Venous Blood Potassium   


 


Urine Color   


 


Urine Appearance   


 


Urine pH   


 


Ur Specific Gravity   


 


Urine Protein   


 


Urine Glucose (UA)   


 


Urine Ketones   


 


Urine Blood   


 


Urine Nitrate   


 


Urine Bilirubin   


 


Urine Urobilinogen   


 


Ur Leukocyte Esterase   


 


Urine RBC   


 


Urine WBC   


 


Ur Epithelial Cells   


 


Amorphous Sediment   


 


Urine Bacteria   


 


Hyaline Casts   


 


Fine Granular Casts   


 


Coarse Granular Casts   


 


Urine Other   


 


Digoxin   














  10/05/18 10/05/18 10/05/18





  05:29 05:30 06:00


 


WBC   17.9 H 


 


RBC   4.90 


 


Hgb   15.1 


 


Hct   44.7 


 


MCV   91.2 


 


MCH   30.8 


 


MCHC   33.8 


 


RDW   14.3 


 


Plt Count   180 


 


MPV   12.5 H 


 


Gran %   84.5 H 


 


Lymph % (Auto)   4.2 L 


 


Mono % (Auto)   11.2 H 


 


Eos % (Auto)   0.0 L 


 


Baso % (Auto)   0.1 


 


Gran #   15.17 H 


 


Lymph # (Auto)   0.8 L 


 


Mono # (Auto)   2.0 H 


 


Eos # (Auto)   0.0 


 


Baso # (Auto)   0.01 


 


PT   


 


INR   


 


APTT   


 


pCO2    29 L


 


pO2    151.0 H


 


HCO3    22.6


 


ABG pH    7.50 H


 


ABG Total CO2    23.5


 


ABG O2 Saturation    99.7 H


 


ABG O2 Content    21.8


 


ABG Base Excess    0.6


 


ABG Hemoglobin    15.8


 


ABG Carboxyhemoglobin    1.5


 


POC ABG HHb (Measured)    0.3


 


ABG Methemoglobin    1.1


 


ABG O2 Capacity    21.9


 


ABG Potassium   


 


VBG pH   


 


VBG pCO2   


 


VBG HCO3   


 


VBG Total CO2   


 


VBG O2 Sat (Calc)   


 


VBG Base Excess   


 


VBG Potassium   


 


Hgb O2 Saturation    97.1


 


Glucose   


 


Lactate   


 


Mechanical Rate   


 


FiO2    80.0


 


Tidal Volume   


 


PEEP   


 


Sodium   


 


Potassium   


 


Chloride   


 


Carbon Dioxide   


 


Anion Gap   


 


BUN   


 


Creatinine   


 


Est GFR ( Amer)   


 


Est GFR (Non-Af Amer)   


 


POC Glucose (mg/dL)  161 H  


 


Random Glucose   


 


Calcium   


 


Phosphorus   


 


Magnesium   


 


Total Bilirubin   


 


AST   


 


ALT   


 


Alkaline Phosphatase   


 


Troponin I   


 


NT-Pro-B Natriuret Pep   


 


Total Protein   


 


Albumin   


 


Globulin   


 


Albumin/Globulin Ratio   


 


Procalcitonin   


 


Arterial Blood Potassium   


 


Venous Blood Potassium   


 


Urine Color   


 


Urine Appearance   


 


Urine pH   


 


Ur Specific Gravity   


 


Urine Protein   


 


Urine Glucose (UA)   


 


Urine Ketones   


 


Urine Blood   


 


Urine Nitrate   


 


Urine Bilirubin   


 


Urine Urobilinogen   


 


Ur Leukocyte Esterase   


 


Urine RBC   


 


Urine WBC   


 


Ur Epithelial Cells   


 


Amorphous Sediment   


 


Urine Bacteria   


 


Hyaline Casts   


 


Fine Granular Casts   


 


Coarse Granular Casts   


 


Urine Other   


 


Digoxin   














Attending/Attestation





- Attestation


I have personally seen and examined this patient.: Yes


I have fully participated in the care of the patient.: Yes


I have reviewed all pertinent clinical information: Yes


Notes (Text): 





10/05/18 07:37





Medical record note made by the resident after discussion with my direction and 

input after the patient was personally seen and examined by me. I have reviewed 

the chart and agree that the record accurately reflects by personal performance 

of the history, physical exam, data review, and medical decision-making, in the 

course for the patient. I have also personally directed the plan of care.





58 yrs old  female with PMH of  CHF with systolic dysfunction EF 17%, HTN, DM, 

COPD,Proxysmal AF and Obesity with SP Cardiac arrest, SP CPR by EMS.Patient is 

intubated.


EKG showed LVH with ST changes.CT scan of head is negative for acute infarct or 

bleeding.Chest CT showed Possible Pneumonia.


Patient has been evaluated by Cardiology and is scheduled to go for cardiac 

cath, on ASA/Metoprolol/Statin and IV heparin.


Patient is On IV antibiotics for possible Aspiration Pneumonia.Patient is going 

to be started on Hypothermic Protocol.


Case was discussed with ICU attending.


Prognosis is guarded.





10/05/18 07:40

## 2018-10-04 NOTE — ED PDOC
Arrival/HPI





- General


Chief Complaint: Cardiac Arrest


Time Seen by Provider: 10/04/18 09:12


Historian: Patient (patient called ems complaining of chest pain prior to cardi

ac arrest), Family, EMS





- Critical Care


Critical Care Minutes: 90 minutes





- History of Present Illness


Narrative History of Present Illness (Text): 








57 y/o F w/PMH of diabetes, HTN, COPD and atrial fibrillation brought into the 

ED via ambulance after witnessed cardiac arrest prior to arrival. Per EMS, the 

patient was on the bus when she felt unwell and dyspneic, sustaining a syncopal 

episode. She was found to be in an initial rhythm of PEA with compressions 

started immediately. Epinephrine was given and an IO was placed in the right 

tibia. She was also given 4 aspirin and intubated prior to arrival. After  2 

minutes of CPR and one round of epinephrine give, ROSC was obtained.





A more complete HPI was unable to be obtained due to patient's clinical 

condition











Time/Duration: Prior to Arrival


Symptom Onset: Sudden


Symptom Course: Unchanged


Severity Level: Severe


Context: Passenger





Past Medical History





- Provider Review


Nursing Documentation Reviewed: Yes





- Travel History


Have you recently traveled outside [place] w/in the past 3 mons?: No





- Infectious Disease


Hx of Infectious Diseases: None





- Tetanus Immunization


Tetanus Immunization: Unknown





- Cardiac


Hx Congestive Heart Failure: Yes


Hx Hypertension: Yes





- Pulmonary


Hx Respiratory Disorders: Yes (USED TO SMOKE CIGARETTES 3 CIG A DAY FOR 15 YRS.)





- Neurological


HX Cerebrovascular Accident: Yes (TIA)





- HEENT


Hx HEENT Disorder: No





- Renal


Hx Renal Disorder: No





- Endocrine/Metabolic


Hx Diabetes Mellitus Type 2: Yes


Hx Hypothyroidism: Yes





- Hematological/Oncological


Hx Blood Disorders: No





- Integumentary


Hx Dermatological Disorder: No





- Musculoskeletal/Rheumatological


Hx Arthritis: Yes





- Gastrointestinal


Hx Gastrointestinal Disorders: Yes (APPENDECTOMY,TONSILLECTOMY)


Hx Gall Bladder Disease: Yes (CHOLEYCYSTECTOMY)





- Genitourinary/Gynecological


Hx Genitourinary Disorders: No





- Psychiatric


Hx Psychophysiologic Disorder: Yes


Hx Anxiety: Yes


Hx Depression: Yes


Hx Substance Use: No





- Surgical History


Hx Appendectomy: Yes


Hx Cholecystectomy: Yes





- Suicidal Assessment


Feels Threatened In Home Enviroment: No





Family/Social History





- Physician Review


Nursing Documentation Reviewed: Yes


Family/Social History: No Known Family HX


Smoking Status: Former Smoker


Hx Alcohol Use: No


Hx Substance Use: No





Allergies/Home Meds


Allergies/Adverse Reactions: 


Allergies





No Known Allergies Allergy (Verified 10/04/18 14:47)


   








Home Medications: 


                                    Home Meds











 Medication  Instructions  Recorded  Confirmed


 


RX: Aspirin [Aspir 81] 81 mg PO DAILY 08/06/13 07/06/18


 


RX: Carvedilol [Coreg] 25 mg PO BID 08/06/13 07/06/18


 


RX: Furosemide [Lasix] 40 mg PO DAILY 07/06/18 07/06/18


 


RX: Valsartan [Diovan] 160 mg PO DAILY 07/06/18 07/06/18


 


RX: Venlafaxine [Effexor] 37.5 mg PO DAILY 07/06/18 07/06/18


 


RX: Zolpidem [Ambien] 5 mg PO HS 07/06/18 07/06/18














Review of Systems





- Physician Review


All systems were reviewed & negative as marked: Yes





- Review of Systems


Respiratory: SOB (Family notes pt has been feeling unwell for last couple of 

days with SOB).  absent: Normal


Cardiovascular: Chest Pain (As per EMS, pt called complaining of chest pain 

prior to cardiac arrest).  absent: Normal


Endocrine: Diaphoresis (As per EMS, pt diaphoretic).  absent: Normal





Physical Exam





- Physical Exam


Narrative Physical Exam (Text): 





PE limited due to patient's condition. 





Physical Exam Limitations: Clinical Condition


Vital Signs Reviewed: Yes





Vital Signs











  Temp Pulse Resp BP Pulse Ox


 


 10/04/18 09:35   65  20  112/69  98


 


 10/04/18 09:20  98.6 F  75  20  139/103 H  98


 


 10/04/18 09:05   91 H  20  139/103 H  97











Blood Pressure: Hypertensive (at 139/103)


Pulse: Tachycardic (at 91)


Respiratory Rate: Normal


Appearance: Positive for: Other (Intubated. Sedated. )


Mental Status: Positive for: other (Intubated).  No: Alert and Oriented X 3


Finger Stick Blood Glucose: 284





- Systems Exam


Head: Present: Atraumatic, Other (Intubated)


Pupils: Present: Sluggish


Conjunctiva: Present: Normal


Respiratory/Chest: Present: Clear to Auscultation (equal breath sounds 

bilaterally), Good Air Exchange.  No: Respiratory Distress, Accessory Muscle Use


Cardiovascular: Present: Peripheal Pulses Present, Tachycardic


Abdomen: No: Tenderness, Distention, Peritoneal Signs


Back: Present: Normal Inspection


Upper Extremity: Present: Normal Inspection.  No: Cyanosis, Edema


Lower Extremity: Present: Other (IO in right tibia ).  No: Normal Inspection, 

Edema


Neurological: Present: Other (Intubated & sedated)


Skin: Present: Warm, Dry, Normal Color.  No: Rashes


Psychiatric: No: Alert, Oriented x 3





Medical Decision Making


ED Course and Treatment: 





10/04/18 10:08


Impression: 58 year old female who was brought in to the emergency department 

via ambulance after witnessed cardiac arrest prior to arrival, PEA.





Differential Diagnosis included but are not limited to:  





- PE


- STEMI


- Intracranial hemorrhage


- Hyperkalemia 








Plan:


-- ABG 


-- CT of cervical spine w/o contrast


-- CT of chest, abd, pelvis w/IV contrast only


-- CT of head w/o contrast


-- EKG


-- Labs


-- Initiation of Hypothermia protocol 


--Cardiology consult


-- CBC (with differential)


-- Partial thromboplastin


-- Prothrombin time 


-- Nexterone 


-- Urinalysis


-- Reassess and disposition





Prior Visits:


Notes and results from previous visits were reviewed. Patient was last seen in 

the emergency department on 07/06/18 for severe worsening shortness of breath 

since the previous night, accompanied by pressure-like epigastric abdominal 

pain. Attempt at cardiac catherization was pursued, but patient adamantly 

refused at this time. She was discharged form the hospital on 07/9/18, diagnosed

 with acute on chronic CHF with ejection fraction of 20%.  





Progress Notes:


10/04/18 09:09


Dr. Peck(interventional cardiology) at the bedside who requests CTH to be 

performed prior to transfer to the cath lab. He requests another [facility] to 

be called once patient has a repeat EKG performed. 


Spoke to Dr. Cruz(intensivist)who requests patient to be worked up and to 

update him on the results.


-----------------------------------------

--------------------------------------------------------------------------------


----------------


Spoke to family who states patient has been feeling unwell for last couple of 

days with shortness of breath. CTH performed & reviewed by me showing no 

evidence of intracranial hemorrhage. CT c/a/p pending. Discussed case with Dr. Peck who requests patient to be brought to the cath lab and to redact [facility] 

[facility]. 


10/04/18 12:02


Spoke to Dr. Prajapati(hospitalist) who accepts the patient under his service. Will 

initiate hypothermia protocol with ice packs and cooled IVF fluids per 

intensivist request. 


--

--------------------------------------------------------------------------------


-------------------------------------------------------











- Critical Care


Critical Care Minutes: 75 minutes





- Lab Interpretations


Lab Results: 











                                 10/04/18 09:15 





                                 10/04/18 09:15 





                                   Lab Results





10/04/18 09:16: pCO2 55 H, pO2 53.0 L, HCO3 23.0, ABG pH 7.23 L, ABG Total CO2 

24.7, ABG O2 Saturation 88.0 L, ABG O2 Content 16.8, ABG Base Excess -5.3 L, ABG

 Hemoglobin 14.2, ABG Carboxyhemoglobin 3.4 H, POC ABG HHb (Measured) 11.5 H, 

ABG Methemoglobin 0.5, ABG O2 Capacity 19.1, Hgb O2 Saturation 84.5 L, FiO2 50.0


10/04/18 09:15: Sodium 139, Potassium 3.9, Chloride 99, Carbon Dioxide 24, Anion

 Gap 19, BUN 21, Creatinine 0.9, Est GFR (African Amer) > 60, Est GFR (Non-Af 

Amer) > 60, Random Glucose 291 H, Calcium 9.1, Total Bilirubin 0.7, AST 32, ALT 

31, Alkaline Phosphatase 79, Troponin I Pending, NT-Pro-B Natriuret Pep Pending,

 Total Protein 7.8, Albumin 4.3, Globulin 3.5, Albumin/Globulin Ratio 1.2


10/04/18 09:15: PT 12.7 H, INR 1.10, APTT 27.3


10/04/18 09:15: WBC 11.6 H D, RBC 5.08, Hgb 15.3, Hct 47.7, MCV 93.9  D, MCH 

30.1, MCHC 32.1, RDW 14.2, Plt Count 213, MPV 12.3 H, Gran % 65.1, Lymph % 

(Auto) 28.0, Mono % (Auto) 5.7, Eos % (Auto) 1.0 L, Baso % (Auto) 0.2, Gran # 

7.57 H, Lymph # (Auto) 3.3, Mono # (Auto) 0.7 H, Eos # (Auto) 0.1, Baso # (Auto)

 0.02











- RAD Interpretation


Narrative RAD Interpretations (Text): 





CT of chest, abd, pelvis reviewed by radiologist, shows: 


Dictator : Jaime Barth MD


Report Date : 10/04/2018 11:38:02


FINDINGS:


CT CHEST WITH CONTRAST:





LUNGS:


There is dense consolidation and atelectasis in the left upper lobe medially.  

There is volume loss with mild mediastinal shift to the left.  There is no 

obvious endobronchial lesion.  Minimal consolidation is seen at both lung bases 

posteriorly.





MEDIASTINUM:


Unremarkable. Normal caliber aorta and pulmonary arterial trunk. No aortic 

dissection.  Moderate to severe cardiomegaly there is vascular crowding around 

the left hilum.  There is no evidence of pulmonary embolus or aortic dissection.





Findings were discussed with Dr. Gaona.





LYMPH NODES:


Unremarkable.





PLEURA:


Unremarkable. No pneumothorax. No pleural fluid.





BONES:


Unremarkable.





OTHER FINDINGS:


None.





CT ABDOMEN AND PELVIS:





LIVER:


Unremarkable. No gross lesion or ductal dilatation. 





GALLBLADDER AND BILE DUCTS:


There is some fluid around the gallbladder.  Possible cholecystitis. 





PANCREAS:


Unremarkable. No gross lesion or ductal dilatation.





SPLEEN:


Unremarkable. 





ADRENALS:


Unremarkable. No mass. 





KIDNEYS AND URETERS:


Unremarkable. No hydronephrosis. No solid mass. 





VASCULATURE:


Unremarkable. No aortic aneurysm. 





BOWEL:


Unremarkable. No obstruction. No gross mural thickening. 





APPENDIX:


Normal appendix. 





PERITONEUM:


Unremarkable. No free fluid. No free air. 





LYMPH NODES:


Unremarkable. No enlarged lymph nodes. 





BLADDER:


Unremarkable. 





REPRODUCTIVE:


Unremarkable. 





BONES:


No acute fracture. 





OTHER FINDINGS:


None.





IMPRESSION:


Left upper lobe atelectasis and consolidation with volume loss in the left lung.

  No evidence of pulmonary embolus or dissection.





Fluid surrounding the gallbladder, possible cholecystitis.


-------------------------------------------------------------

----------------------------------------------------------------------------


CT of head reviewed by radiologist, shows:


Dictator : Jaime Barth MD


Report Date : 10/04/2018 11:53:08





FINDINGS:


HEMORRHAGE:


No intracranial hemorrhage. 





BRAIN:


No mass effect or edema.  No atrophy or chronic microvascular ischemic changes.





VENTRICLES:


Unremarkable. No hydrocephalus. 





CALVARIUM:


Unremarkable.





PARANASAL SINUSES:


Unremarkable as visualized. No significant inflammatory changes.





MASTOID AIR CELLS:


Unremarkable as visualized. No inflammatory changes.





OTHER FINDINGS:


None.





IMPRESSION:


No acute findings


 

--------------------------------------------------------------------------------


---------------------------------------------------------


X-Ray of chest reviewed by radiologist, shows:


Dictator : Kerri Beavers MD


Report Date : 10/04/2018 10:25:32





FINDINGS:


Endotracheal tube terminates 3.5 cm proximal to the ann-marie. 





LUNGS:


There is diffuse haziness in the right lung. There is multifocal linear 

atelectasis in the right lung.





PLEURA:


No significant pleural effusion identified, no pneumothorax apparent.





CARDIOVASCULAR:


Again seen is severe cardiomegaly 





OSSEOUS STRUCTURES:


No significant abnormalities.





VISUALIZED UPPER ABDOMEN:


Normal.





OTHER FINDINGS:


None.





IMPRESSION:


Severe cardiomegaly and layering pleural effusions/pulmonary edema in the right 

lung.


----------------

--------------------------------------------------------------------------------


-----------------------------------------


CT of cervical spine reviewed by radiologist, shows:


Dictator : Jaime Barth MD


Report Date : 10/04/2018 11:55:27





FINDINGS:


VERTEBRAE:


No fracture. Normal alignment. No destructive bony lesion.





DISCS/SPINAL CANAL/NEURAL FORAMINA:


No significant central canal or neural foraminal stenosis. Discs heights are 

grossly preserved.





PARASPINAL SOFT TISSUES:


Unremarkable. 





OTHER FINDINGS:


None.





IMPRESSION:


No acute findings.








Radiology Orders: 











10/04/18 09:14


CHEST PORTABLE [RAD] Stat 





10/04/18 09:16


CERVICAL SPINE W/O CONTRAST [CT] Stat 


HEAD W/O CONTRAST [CT] Stat 





10/04/18 09:29


CHEST,ABD,PEL W/IV CONT ONLY [CT] Stat 











: Radiologist





- EKG Interpretation


EKG Interpretation (Text): 





10/04/18 


EKG 1: 


Rhythm: Sinus Tachycardia 


Rate:101 BPM. ST depressions seen V5-V6.


Interpretation: T-wave inversions in V2, V5, V6, Lead I and AVL.





EKG 2: 


Rate :  103 BPM


Rhythm : Sinus tachycardia


Interpretation : ST depressions in V4-V6. T-wave inversions in AVL. Left axis 

deviation.





EKG 3: 


EKG: Ordered, reviewed, and independently interpreted the EKG.


Rate :  75 BPM


Interpretation : Normal sinus rhythm. Prolonged QT waves. T-wave inversion in 

AVL.








Interpreted by ED Physician: Yes


Type: 12 lead EKG





- Medication Orders


Current Medication Orders: 














Discontinued Medications





Amiodarone HCl/Dextrose (Nexterone 150 Mg In Dextrose 100 Ml (Premix))  150 mg 

in 100 mls @ 600 mls/hr IVPB ONCE ONE; Protocol


   Stop: 10/04/18 09:25


   Last Admin: 10/04/18 09:20  Dose: 600 mls/hr





eMAR Start Stop


 Document     10/04/18 09:20  LAURA  (Rec: 10/04/18 09:34  LAURA  [id number])


     Intravenous Solution


      Start Date                                 10/04/18


      Start Time                                 09:20


      End Date                                   10/04/18


      End time                                   09:30


      Total Infusion Time                        10














- Scribe Statement


The provider has reviewed the documentation as recorded by the Scribe





Meghan Mcghee 





All medical record entries made by the Scribe were at my direction and person

ally dictated by me. I have reviewed the chart and agree that the record 

accurately reflects my personal performance of the history, physical exam, 

medical decision making, and the department course for this patient. I have also

 personally directed, reviewed, and agree with the discharge instructions and 

disposition.





Disposition/Present on Arrival





- Present on Arrival


Any Indicators Present on Arrival: No


History of DVT/PE: No


History of Uncontrolled Diabetes: No


Urinary Catheter: No


History of Decub. Ulcer: No


History Surgical Site Infection Following: None





- Disposition


Have Diagnosis and Disposition been Completed?: Yes


Diagnosis: 


 Cardiopulmonary arrest with successful resuscitation





Disposition: HOSPITALIZED


Disposition Time: 12:10


Patient Plan: Admission, ICU


Condition: CRITICAL

## 2018-10-04 NOTE — CPOSTOP
DATE:  10/04/2018



CARDIOVASCULAR LAB POST PROCEDURE NOTE



DICTATING PHYSICIAN:  Booker Peck MD



SCRUB TECHNICIAN:  Leann cardiovascular technician.



TYPE OF ANESTHESIA:  The patient is on ventilator, on propofol drip.



PRE-PROCEDURE DIAGNOSES:  Cardiogenic shock, rule out ST-segment myocardial

infarction, the patient collapsed, possible ventricular tachycardia and

ventricular fibrillation.



PROCEDURE PERFORMED:  Left heart catheterization.



FINDINGS:

1.  Mild nonobstructive coronary artery disease, limited only to mid

circumflex 60%-70%.

2.  Severely decreased LV function, ejection fraction 15%-20%.



FINAL DIAGNOSIS:  Nonischemic cardiomyopathy.



VASCULAR ACCESS SITE:  Right femoral groin.



CLOSURE DEVICE:  Angio-Seal.



TOTAL RADIATION DOSE:  6123.5 milligray unit.



TOTAL FLUORO TIME:  1.8 minute.





__________________________________________

Booker Peck MD



DD:  10/04/2018 15:34:49

DT:  10/04/2018 16:42:26

Job # [id number]

## 2018-10-04 NOTE — CT
Date of service: 



10/04/2018



PROCEDURE:  CT Cervical Spine without contrast



HISTORY:

s/p cardiac arrest



COMPARISON:

None available.



TECHNIQUE:

Axial computed tomography images were obtained of the cervical spine 

without the use of intravenous contrast. Coronal and sagittal 

reformatted images were created and reviewed.



Radiation dose: 



Total exam DLP = 635 mGy-cm.



This CT exam was performed using one or more of the following dose 

reduction techniques: Automated exposure control, adjustment of the 

mA and/or kV according to patient size, and/or use of iterative 

reconstruction technique.



FINDINGS:



VERTEBRAE:

No fracture. Normal alignment. No destructive bony lesion.



DISCS/SPINAL CANAL/NEURAL FORAMINA:

No significant central canal or neural foraminal stenosis. Discs 

heights are grossly preserved.



PARASPINAL SOFT TISSUES:

Unremarkable. 



OTHER FINDINGS:

None.



IMPRESSION:

No acute findings

## 2018-10-04 NOTE — CT
Date of service: 



10/04/2018



PROCEDURE:  CT Chest, Abdomen and Pelvis with intravenous contrast



HISTORY:

s/p cardiac arrest



COMPARISON:

None available.



TECHNIQUE:

IV dose administered:  150 cc of Omni 350 



Radiation dose:



Total exam DLP = 1715 mGy-cm.



This CT exam was performed using one or more of the following dose 

reduction techniques: Automated exposure control, adjustment of the 

mA and/or kV according to patient size, and/or use of iterative 

reconstruction technique.



FINDINGS:



CT CHEST WITH CONTRAST:



LUNGS:

There is dense consolidation and atelectasis in the left upper lobe 

medially.  There is volume loss with mild mediastinal shift to the 

left.  There is no obvious endobronchial lesion.  Minimal 

consolidation is seen at both lung bases posteriorly.



MEDIASTINUM:

Unremarkable. Normal caliber aorta and pulmonary arterial trunk. No 

aortic dissection.  Moderate to severe cardiomegaly there is vascular 

crowding around the left hilum.  There is no evidence of pulmonary 

embolus or aortic dissection.



Findings were discussed with Dr. Gaona.



LYMPH NODES:

Unremarkable.



PLEURA:

Unremarkable. No pneumothorax. No pleural fluid.



BONES:

Unremarkable.



OTHER FINDINGS:

None.



CT ABDOMEN AND PELVIS:



LIVER:

Unremarkable. No gross lesion or ductal dilatation. 



GALLBLADDER AND BILE DUCTS:

There is some fluid around the gallbladder.  Possible cholecystitis. 



PANCREAS:

Unremarkable. No gross lesion or ductal dilatation.



SPLEEN:

Unremarkable. 



ADRENALS:

Unremarkable. No mass. 



KIDNEYS AND URETERS:

Unremarkable. No hydronephrosis. No solid mass. 



VASCULATURE:

Unremarkable. No aortic aneurysm. 



BOWEL:

Unremarkable. No obstruction. No gross mural thickening. 



APPENDIX:

Normal appendix. 



PERITONEUM:

Unremarkable. No free fluid. No free air. 



LYMPH NODES:

Unremarkable. No enlarged lymph nodes. 



BLADDER:

Unremarkable. 



REPRODUCTIVE:

Unremarkable. 



BONES:

No acute fracture. 



OTHER FINDINGS:

None.



IMPRESSION:

Left upper lobe atelectasis and consolidation with volume loss in the 

left lung.  No evidence of pulmonary embolus or dissection.



Fluid surrounding the gallbladder, possible cholecystitis.

## 2018-10-04 NOTE — RAD
Date of service: 



10/04/2018



HISTORY:

Cardiac arrest, ETT placement 



COMPARISON:

07/08/2018 



FINDINGS:

Endotracheal tube terminates 3.5 cm proximal to the ann-marie. 



LUNGS:

There is diffuse haziness in the right lung. There is multifocal 

linear atelectasis in the right lung.



PLEURA:

No significant pleural effusion identified, no pneumothorax apparent.



CARDIOVASCULAR:

Again seen is severe cardiomegaly 



OSSEOUS STRUCTURES:

No significant abnormalities.



VISUALIZED UPPER ABDOMEN:

Normal.



OTHER FINDINGS:

None.



IMPRESSION:

Severe cardiomegaly and layering pleural effusions/pulmonary edema in 

the right lung.

## 2018-10-04 NOTE — CARD
--------------- APPROVED REPORT --------------





Date of service: 10/04/2018



EXAM: Two-dimensional and M-mode echocardiogram with Doppler and 

color Doppler.



INDICATION

CARDIAC ARREST



2D DIMENSIONS 

Left Atrium (2D)5.7   (1.6-4.0cm)IVSd1.4   (0.7-1.1cm)

LVDd7.0   (3.9-5.9cm)PWd1.4   (0.7-1.1cm)

LVDs6.4   (2.5-4.0cm)FS (%) 8.1   %

LVEF (%)17.1   (>50%)



M-Mode DIMENSIONS 

Aortic Root2.80   (2.2-3.7cm)Aortic Cusp Exc.2.10   (1.5-2.0cm)



Aortic Valve

AoV Peak Khnbuuzi768.0cm/Pavan Peak GR.7mmHg



Mitral Valve

MV E Gygdiesq64.0cm/sMV A Dkcvlvsq05.4cm/sE/A ratio1.2



TDI

E/Lateral E'0.0E/Medial E'0.0



Tricuspid Valve

TR Peak Tjvnejpo782te/sRAP [id number] Peak Gr.56mmHg

[id number]



 LEFT VENTRICLE 

The Left Ventricle is severely dilated. There is mild concentric left 

ventricular hypertrophy. The systolic function is severely impaired. 

There is global hypokinesis of the left ventricle. Transmitral 

Doppler flow pattern is Grade I-abnormal relaxation pattern. No left 

ventricle thrombus noted on this study.



 RIGHT VENTRICLE 

The right ventricle is mildly dilated. There is normal right 

ventricular wall thickness. Systolic function is moderately reduced.



 ATRIA 

The left atrium is severely dilated. The right atrium is moderately 

dilated.



 AORTIC VALVE 

The aortic valve is mildly thickened. There is moderate aortic 

regurgitation. There is no aortic valvular stenosis. 



 MITRAL VALVE 

The mitral valve is mildly thickened. Mitral regurgitation is 

moderate. There is no mitral valve stenosis.



 TRICUSPID VALVE 

The tricuspid valve is normal in structure. There is moderate 

tricuspid regurgitation. There is severe pulmonary hypertension.



 PULMONIC VALVE 

The pulmonary valve is normal in structure. There is no pulmonic 

valvular regurgitation. 



 GREAT VESSELS 

The aortic root is normal in size. The IVC is dilated.



 PERICARDIAL EFFUSION 

There is no pericardial effusion.



<Conclusion>

The Left Ventricle is severely dilated.

There is mild concentric left ventricular hypertrophy.

The systolic function is severely impaired.

There is global hypokinesis of the left ventricle.

Transmitral Doppler flow pattern is Grade I-abnormal relaxation 

pattern.

No left ventricle thrombus noted on this study.

There is moderate aortic regurgitation.

Mitral regurgitation is moderate.

There is moderate tricuspid regurgitation.

There is severe pulmonary hypertension.

## 2018-10-04 NOTE — CP.PCM.CON
History of Present Illness





- History of Present Illness


History of Present Illness: 





  Neurology consult dictated. 


Chart reviewed and patient examined. 


A/P: Patient is on diprivan and currently only has a brisk gag, no corneals, no 

gag, no dolls eyes. 


She is not posturing at this time. 


no gtc activity noted. She may have and most likely does have anoxic brain 

injury. 





Plan: 


1. video eeg am. 


2. MRI brain when stable, as she may have had a massive stroke in addition. 


3. aspirin 325 mg po rectally. 





Thank you for consulting neurology


Dr. larsen 





Past Patient History





- Infectious Disease


Hx of Infectious Diseases: None





- Tetanus Immunizations


Tetanus Immunization: Unknown





- Past Social History


Smoking Status: Former Smoker





- CARDIAC


Hx Congestive Heart Failure: Yes


Hx Hypertension: Yes





- PULMONARY


Hx Respiratory Disorders: Yes (USED TO SMOKE CIGARETTES 3 CIG A DAY FOR 15 YRS.)





- NEUROLOGICAL


HX Cerebrovascular Accident: Yes (TIA)





- HEENT


Hx HEENT Problems: No





- RENAL


Hx Chronic Kidney Disease: No





- ENDOCRINE/METABOLIC


Hx Diabetes Mellitus Type 2: Yes


Hx Hypothyroidism: Yes





- HEMATOLOGICAL/ONCOLOGICAL


Hx Blood Disorders: No





- INTEGUMENTARY


Hx Dermatological Problems: No





- MUSCULOSKELETAL/RHEUMATOLOGICAL


Hx Arthritis: Yes





- GASTROINTESTINAL


Hx Gastrointestinal Disorders: Yes (APPENDECTOMY,TONSILLECTOMY)


Hx Gall Bladder Disease: Yes (CHOLEYCYSTECTOMY)





- GENITOURINARY/GYNECOLOGICAL


Hx Genitourinary Disorders: No





- PSYCHIATRIC


Hx Psychophysiologic Disorder: Yes


Hx Anxiety: Yes


Hx Depression: Yes


Hx Substance Use: No





- SURGICAL HISTORY


Hx Appendectomy: Yes


Hx Cholecystectomy: Yes





Meds


Allergies/Adverse Reactions: 


                                    Allergies











Allergy/AdvReac Type Severity Reaction Status Date / Time


 


No Known Allergies Allergy   Verified 10/04/18 11:37














- Medications


Medications: 


                               Current Medications





Atorvastatin Calcium (Lipitor)  80 mg PO DIN JIM


Dobutamine HCl/Dextrose (Dobutamine/Dextrose 5% 500mg/250ml)  500 mg in 250 mls 

@ 7.62 mls/hr IV .Q24H PRN; Protocol


   PRN Reason: TITRATE PER PROTOCOL


   Last Admin: 10/04/18 11:43 Dose:  2 mcg/kg/min, 7.62 mls/hr


Propofol (Diprivan)  1,000 mg in 100 mls @ 3.81 mls/hr IV .Q24H PRN; Protocol


   PRN Reason: TITRATE PER MD ORDER


   Last Admin: 10/04/18 11:44 Dose:  5 mcg/kg/min, 3.81 mls/hr


Heparin Sodium/Sodium Chloride (Heparin 77916 Units/250ml 1/2 Normal Saline)  

25,000 units in 250 mls @ 15.241 mls/hr IV .L47Q56M JIM; Protocol


   Last Admin: 10/04/18 11:30 Dose:  Not Given


Levetiracetam (Keppra 500mg Ivpb)  500 mg in 100 mls @ 400 mls/hr IVPB Q12 JIM


   Last Admin: 10/04/18 11:42 Dose:  400 mls/hr


Ceftriaxone Sodium (Rocephin 1 Gram Ivpb)  1 gm in 100 mls @ 100 mls/hr IVPB 

DAILY JIM; Protocol


   Last Admin: 10/04/18 12:31 Dose:  100 mls/hr


Doxycycline Hyclate 100 mg/ (Sodium Chloride)  100 mls @ 100 mls/hr IVPB Q12 

JIM; Protocol


   Last Admin: 10/04/18 13:28 Dose:  100 mls/hr


Insulin Human Regular (Humulin R Med)  0 units SC Q2H JIM; Protocol


   Last Admin: 10/04/18 13:55 Dose:  1 unit


Pantoprazole Sodium (Protonix Inj)  40 mg IVP DAILY JIM


   Last Admin: 10/04/18 12:31 Dose:  40 mg











Results





- Vital Signs


Recent Vital Signs: 


                                Last Vital Signs











Temp  98.6 F   10/04/18 09:20


 


Pulse  83   10/04/18 13:30


 


Resp  20   10/04/18 13:30


 


BP  159/107 H  10/04/18 13:30


 


Pulse Ox  96   10/04/18 13:30














- Labs


Result Diagrams: 


                                 10/04/18 09:15





                                 10/04/18 09:15


Labs: 


                         Laboratory Results - last 24 hr











  10/04/18 10/04/18 10/04/18





  09:15 09:15 09:15


 


WBC  11.6 H D  


 


RBC  5.08  


 


Hgb  15.3  


 


Hct  47.7  


 


MCV  93.9  D  


 


MCH  30.1  


 


MCHC  32.1  


 


RDW  14.2  


 


Plt Count  213  


 


MPV  12.3 H  


 


Gran %  65.1  


 


Lymph % (Auto)  28.0  


 


Mono % (Auto)  5.7  


 


Eos % (Auto)  1.0 L  


 


Baso % (Auto)  0.2  


 


Gran #  7.57 H  


 


Lymph # (Auto)  3.3  


 


Mono # (Auto)  0.7 H  


 


Eos # (Auto)  0.1  


 


Baso # (Auto)  0.02  


 


PT   12.7 H 


 


INR   1.10 


 


APTT   27.3 


 


pCO2   


 


pO2   


 


HCO3   


 


ABG pH   


 


ABG Total CO2   


 


ABG O2 Saturation   


 


ABG O2 Content   


 


ABG Base Excess   


 


ABG Hemoglobin   


 


ABG Carboxyhemoglobin   


 


POC ABG HHb (Measured)   


 


ABG Methemoglobin   


 


ABG O2 Capacity   


 


ABG Potassium   


 


Hgb O2 Saturation   


 


Glucose   


 


Lactate   


 


Mechanical Rate   


 


FiO2   


 


Tidal Volume   


 


PEEP   


 


Sodium    139


 


Potassium    3.9


 


Chloride    99


 


Carbon Dioxide    24


 


Anion Gap    19


 


BUN    21


 


Creatinine    0.9


 


Est GFR ( Amer)    > 60


 


Est GFR (Non-Af Amer)    > 60


 


POC Glucose (mg/dL)   


 


Random Glucose    291 H


 


Calcium    9.1


 


Total Bilirubin    0.7


 


AST    32


 


ALT    31


 


Alkaline Phosphatase    79


 


Troponin I    0.05


 


NT-Pro-B Natriuret Pep    5280 H


 


Total Protein    7.8


 


Albumin    4.3


 


Globulin    3.5


 


Albumin/Globulin Ratio    1.2


 


Arterial Blood Potassium   


 


Urine Color   


 


Urine Appearance   


 


Urine pH   


 


Ur Specific Gravity   


 


Urine Protein   


 


Urine Glucose (UA)   


 


Urine Ketones   


 


Urine Blood   


 


Urine Nitrate   


 


Urine Bilirubin   


 


Urine Urobilinogen   


 


Ur Leukocyte Esterase   


 


Urine RBC   


 


Urine WBC   


 


Ur Epithelial Cells   


 


Amorphous Sediment   


 


Urine Bacteria   


 


Hyaline Casts   


 


Fine Granular Casts   


 


Coarse Granular Casts   


 


Urine Other   














  10/04/18 10/04/18 10/04/18





  09:16 11:21 12:02


 


WBC   


 


RBC   


 


Hgb   


 


Hct   


 


MCV   


 


MCH   


 


MCHC   


 


RDW   


 


Plt Count   


 


MPV   


 


Gran %   


 


Lymph % (Auto)   


 


Mono % (Auto)   


 


Eos % (Auto)   


 


Baso % (Auto)   


 


Gran #   


 


Lymph # (Auto)   


 


Mono # (Auto)   


 


Eos # (Auto)   


 


Baso # (Auto)   


 


PT   


 


INR   


 


APTT   


 


pCO2  55 H   53 H


 


pO2  53.0 L   66.0 L


 


HCO3  23.0   24.9


 


ABG pH  7.23 L   7.28 L


 


ABG Total CO2  24.7   26.5


 


ABG O2 Saturation  88.0 L   94.6 L


 


ABG O2 Content  16.8  


 


ABG Base Excess  -5.3 L   -2.6 L


 


ABG Hemoglobin  14.2  


 


ABG Carboxyhemoglobin  3.4 H  


 


POC ABG HHb (Measured)  11.5 H  


 


ABG Methemoglobin  0.5  


 


ABG O2 Capacity  19.1  


 


ABG Potassium    4.2


 


Hgb O2 Saturation  84.5 L  


 


Glucose    284 H


 


Lactate    2.2 H


 


Mechanical Rate    20


 


FiO2  50.0   100.0


 


Tidal Volume    400


 


PEEP    5


 


Sodium    134.0


 


Potassium   


 


Chloride    101.0


 


Carbon Dioxide   


 


Anion Gap   


 


BUN   


 


Creatinine   


 


Est GFR ( Amer)   


 


Est GFR (Non-Af Amer)   


 


POC Glucose (mg/dL)   


 


Random Glucose   


 


Calcium   


 


Total Bilirubin   


 


AST   


 


ALT   


 


Alkaline Phosphatase   


 


Troponin I   


 


NT-Pro-B Natriuret Pep   


 


Total Protein   


 


Albumin   


 


Globulin   


 


Albumin/Globulin Ratio   


 


Arterial Blood Potassium    4.2


 


Urine Color   Yellow 


 


Urine Appearance   Clear 


 


Urine pH   6.0 


 


Ur Specific Gravity   1.020 


 


Urine Protein   >=300 H 


 


Urine Glucose (UA)   100 H 


 


Urine Ketones   Negative 


 


Urine Blood   Moderate H 


 


Urine Nitrate   Negative 


 


Urine Bilirubin   Negative 


 


Urine Urobilinogen   0.2 


 


Ur Leukocyte Esterase   Negative 


 


Urine RBC   20 - 25 


 


Urine WBC   1 - 3 


 


Ur Epithelial Cells   6 - 8 


 


Amorphous Sediment   Few 


 


Urine Bacteria   Many 


 


Hyaline Casts   0 - 2 


 


Fine Granular Casts   0 - 2 


 


Coarse Granular Casts   Trace H 


 


Urine Other   Uyeast 














  10/04/18





  13:33


 


WBC 


 


RBC 


 


Hgb 


 


Hct 


 


MCV 


 


MCH 


 


MCHC 


 


RDW 


 


Plt Count 


 


MPV 


 


Gran % 


 


Lymph % (Auto) 


 


Mono % (Auto) 


 


Eos % (Auto) 


 


Baso % (Auto) 


 


Gran # 


 


Lymph # (Auto) 


 


Mono # (Auto) 


 


Eos # (Auto) 


 


Baso # (Auto) 


 


PT 


 


INR 


 


APTT 


 


pCO2 


 


pO2 


 


HCO3 


 


ABG pH 


 


ABG Total CO2 


 


ABG O2 Saturation 


 


ABG O2 Content 


 


ABG Base Excess 


 


ABG Hemoglobin 


 


ABG Carboxyhemoglobin 


 


POC ABG HHb (Measured) 


 


ABG Methemoglobin 


 


ABG O2 Capacity 


 


ABG Potassium 


 


Hgb O2 Saturation 


 


Glucose 


 


Lactate 


 


Mechanical Rate 


 


FiO2 


 


Tidal Volume 


 


PEEP 


 


Sodium 


 


Potassium 


 


Chloride 


 


Carbon Dioxide 


 


Anion Gap 


 


BUN 


 


Creatinine 


 


Est GFR ( Amer) 


 


Est GFR (Non-Af Amer) 


 


POC Glucose (mg/dL)  184 H


 


Random Glucose 


 


Calcium 


 


Total Bilirubin 


 


AST 


 


ALT 


 


Alkaline Phosphatase 


 


Troponin I 


 


NT-Pro-B Natriuret Pep 


 


Total Protein 


 


Albumin 


 


Globulin 


 


Albumin/Globulin Ratio 


 


Arterial Blood Potassium 


 


Urine Color 


 


Urine Appearance 


 


Urine pH 


 


Ur Specific Gravity 


 


Urine Protein 


 


Urine Glucose (UA) 


 


Urine Ketones 


 


Urine Blood 


 


Urine Nitrate 


 


Urine Bilirubin 


 


Urine Urobilinogen 


 


Ur Leukocyte Esterase 


 


Urine RBC 


 


Urine WBC 


 


Ur Epithelial Cells 


 


Amorphous Sediment 


 


Urine Bacteria 


 


Hyaline Casts 


 


Fine Granular Casts 


 


Coarse Granular Casts 


 


Urine Other

## 2018-10-04 NOTE — CON
DATE:  10/04/2018



HISTORY OF PRESENT ILLNESS:  This is a 58-year-old lady, ex-smoker, with

severe left ventricular systolic dysfunction, hypertension, who first

presented to [facility] ER after witnessed cardiac arrest. 

Initially, the patient was at her home and called EMS due to her abrupt

onset of chest pain.  No more history about quality, location, aggravating

or alleviating factors of the chest pain available, as the patient now is

intubated and unresponsive.  As per available records, cardiac arrest

rhythm was non shockable.  The patient had ROSC within a few minutes after

initiation of CPR and epinephrine.  The patient received aspirin on the

field prior to collapsing and losing pulse.  As per family members at

bedside, the patient was feeling unwell with shortness of breath for a few

days prior to the episode.  Upon admission to ER, the patient had pan CT of

the entire body, which revealed no acute intracranial pathology and no

cervical fracture.  CT chest showed left upper lobe and lingular

atelectasis with some bibasilar consolidation.  No PE was observed.  No

intra-abdominal catastrophe was followed; however, some pericholecystic

fluid with some gallbladder dilatation was found.  Of note, no LFT

abnormalities or bilirubinemia was also found on the patient's labs.  No

fever, no leukocytosis reported.  No nausea, no vomiting, no diarrhea, no

constipation.



PAST MEDICAL HISTORY:  Hypertension, COPD, atrial fibrillation, diabetes

mellitus, left ventricular systolic dysfunction.



FAMILY HISTORY:  Noncontributory.



SOCIAL HISTORY:  The patient is ex-smoker; however, no alcohol or illicit

drug abuse.



ALLERGIES:  NKDA.



MEDICATIONS AT HOME:  Ambien, Effexor, Diovan, spironolactone, Lasix,

digoxin, Coreg, aspirin.



REVIEW OF SYSTEMS:  Review of 12-organ system other than mentioned in

history of present illness is negative.



PHYSICAL EXAMINATION:

VITAL SIGNS:  Blood pressure 146/86, heart rate 68, respiratory rate 20,

oxygen saturation 96%.

ENT:  Head and neck atraumatic.

LUNGS:  Decreased breath sounds bilaterally.

HEART:  Regular rate and rhythm.  S1, S2 distant.

ABDOMEN:  Soft, nontender, nondistended.

MUSCULOSKELETAL:  Trace bilateral pedal and ankle edema.

NEUROLOGICAL:  The patient is not moving all extremities.  Some myoclonic

movement noted.

PSYCHIATRIC:  The patient is not responsive to painful or touch stimuli.



LABORATORY DATA:  WBC 11.6, hemoglobin 15.3, platelet count 213. 

Eosinophils 1.  Sodium 139, potassium 3.9, chloride 99, carbon dioxide 24,

BUN 21, creatinine 0.9, glucose 291.  ProBNP 5280.  Troponin 0.05, albumin

4.3.  INR 1.10.  ABG showed pH 7.23, pCO2 55, pO2 53.





Chest x-ray showed severe cardiomegaly,  pleural effusion/pulmonary

edema in the right lung.



ASSESSMENT AND PLAN:  This is a 58-year-old lady who presented with cardiac

arrest with non-shockable rhythm with return of spontaneous circulation

within a few minutes after initiation of advanced cardiac life

support/cardiopulmonary resuscitation with subsequent restoration of

relative hemodynamic stability.



Neuro:  CT head did not reveal any intracranial pathology.  No

intracerebral hemorrhage.  Myoclonic movements of the tongue suspicious for

anoxic brain injury.  The patient will be started on propofol, Keppra. 

Neurology consult will be called.  Continuous EEG monitoring started.  We

will proceed with therapeutic hypothermia protocol, maintaining temperature

between 34 -36 degrees Celsius.



Pulmonary:  The patient has some left upper and mid lobe atelectasis of

unclear etiology.  No endobronchial lesion observed on the CT scan

(discussed with Dr. Mosley).  We will proceed with protective lung

ventilation strategy, maintaining tidal volume 4-8 mL per predicted body

weight and plateau pressure less than 30 cm of water to avoid ventilator

induced lung injury.  We will waork with higher PEEP/FiO2 ratio, which hopefully
allow to open up ASH and Lingula.  We will

continue with conservative fluid (after hypothermia protocol completes, 
otherwise IVF will continue to compensate for autodiuresis during hypothermia 
phase) and oxygen management.  Daily sedation and

weaning vacation.  Head of bed elevated >35 degrees.  Oral hygiene. Maintain ph 
7.35-7.45



Cardiovascular:  The patient experienced cardiac arrest.  Bedside

echocardiogram revealed significantly depressed ejection fraction (?10%)

which is worse comparing with previous echocardiogram done in July this

year (20%).  We will proceed with formal echocardiogram to better visualize

and assess right and left ventricle.  Cardiology, Dr. Peck, is on board and

we will proceed with cardiac catheterization.  The patient will be started

on dobutamine, aspirin and Plavix, statins will be initiated.  Afterload

reduction will be provided with positive pressure ventilation as well as

propofol.  Once weaned off of ionotropic support, beta-blockers will be

considered.



GI:  The patient will be n.p.o until completion of hypothermia phase.  GI 
prophylaxis will be instituted.



ID:  The patient had some pericholecystic fluid and mild leukocytosis.  We

will proceed with therapeutic hypothermia.  We will be having lower

threshold for initiation of empiric antibiotics.  I will start with

ceftriaxone.  Blood, urine, sputum culture will be sent.  Urine for

Legionella and streptococcal antigen will be sent as well.  Procalcitonin

will be ordered.  ID consult will be requested.



Endocrine:  We will proceed with Accu-Chek every 2 hours with medium

protocol coverage.  We will maintain blood glucose within 140-180 range

according to NICE sugar trial.



We will proceed with therapeutic hypothermia.  Maintain euvolemia,

euglycemia, normothermia and oxygen saturation more than 90%.



ccm time 40 min







__________________________________________

Matthew Gaona MD





DD:  10/04/2018 11:51:33

DT:  10/04/2018 12:48:59

Job # [id number]



TODD

## 2018-10-04 NOTE — CARD
--------------- APPROVED REPORT --------------





Date of service: 10/04/2018



EKG Measurement

Heart [id number]

OR [id number]

[id number]

[id number]

[id number]



<Conclusion>

Sinus tachycardia

Biatrial enlargement

Left axis deviation

Left ventricular hypertrophy with repolarization abnormality

Abnormal ECG

## 2018-10-04 NOTE — CARD
--------------- APPROVED REPORT --------------





Date of service: 10/04/2018



EKG Measurement

Heart [id number]

AK [id number]

[id number]

[id number]

[id number]



<Conclusion>

Normal sinus rhythm

Left atrial enlargement

Left axis deviation

Left ventricular hypertrophy with repolarization abnormality

Prolonged QT

Abnormal ECG

## 2018-10-04 NOTE — RAD
Date of service: 



10/04/2018



HISTORY:

 NGT placement 



COMPARISON:

No prior. 



FINDINGS:



LUNGS:

Left upper lobe infiltrate.



PLEURA:

No significant pleural effusion identified, no pneumothorax apparent.



CARDIOVASCULAR:

Normal.



OSSEOUS STRUCTURES:

No significant abnormalities.



VISUALIZED UPPER ABDOMEN:

Normal.



OTHER FINDINGS:

None.



IMPRESSION:

Endotracheal tube and nasogastric tube in satisfactory position

## 2018-10-04 NOTE — CARD
--------------- APPROVED REPORT --------------





Date of service: 10/04/2018



Procedure(s) performed: Left Heart Catheterization



HISTORY

The patient is a 58 year-old female with a history of : most recent 

EF: 20%. (EF Method: Echocardiogram), previous CHF, previous CVA 

remote >= 2 weeks, diabetes mellitus with oral treatment , 

hypertension , cerebrovascular disease , Admitted after Cardiac 

arrest S/p Intubated, Vt/ Vfib, Resuusscitaed, intubated initial EKG 

on tele tranmission ST elevation as Per ER Physician, Brought to Cath 

Lab as Code STEMI after CT scan of Head and Chest..



INDICATION

The indication(s) include : STEMI .



CASE TECHNIQUE

The patient was brought salvage to the Cardiac Catheterization 

Laboratory in a fasting state and was prepped and draped in a sterile 

manner. The right femoral groin was infiltrated with 2% Lidocaine 

subcutaneous anesthesia. A 6 Fr x 11 cm Renuka sheath was inserted 

into the right femoral artery without difficulty. Coronary 

angiography was performed using coronary diagnostic catheters. The 

left coronary system was accessed and visualized with a Diagnostic ,

6F JL4 CATH  CM catheter. The right coronary system was 

accessed and visualized with a Diagnostic ,6F JR 4 CATH  CM 

catheter. The left ventricle was accessed and visualized with a 6F 

PIGTAIL 145 CATH  CM catheter. Left ventricular/Aortic Valve 

gradient assessed on pullback. Left ventriculogram was performed in 

FOSS projection. Closure device was deployed with a 6 Fr Angio-Seal 

without any complications. The patient tolerated the procedure well 

and there were no complications associated with the procedure. 



Vessel Analysis

The patient's coronary anatomy is right dominant.



The left main coronary artery is a large size vessel with intimal 

irregularities and without significant stenosis. The left main 

bifurcates to the left anterior descending and circumflex.



The left anterior descending artery is a large size vessel with 

diffuse calcification noted throughout this vessel and without 

significant stenosis. The first diagonal branch is a small size 

vessel with diffuse calcification noted throughout this vessel and 

without significant stenosis. The second diagonal branch is a small 

size vessel with diffuse calcification noted throughout this vessel 

and without significant stenosis. 



The circumflex artery is a large size vessel with diffuse 

calcification noted throughout this vessel and without significant 

stenosis. There is a 60-70% stenosis in the mid segment. The first 

obtuse marginal branch is a small size vessel with diffuse 

calcification noted throughout this vessel and without significant 

stenosis. The second obtuse marginal branch is a medium size vessel 

with diffuse calcification noted throughout this vessel and without 

significant stenosis. 



The right coronary artery is a medium size vessel with diffuse 

calcification noted throughout this vessel and without significant 

stenosis. There is a 30-40% stenosis in the mid segment. The right 

posterior descending artery is a medium size vessel with diffuse 

calcification noted throughout this vessel and without significant 

stenosis. The right posterolateral branch is a medium size vessel 

with diffuse calcification noted throughout this vessel and without 

significant stenosis. 



Left Ventricle

The left ventricle is enlarged in size with severely decreased 

contractility. Non-Ischemic cardiomyopathy. The left ventricular 

ejection fraction is estimated to be 15-20%. The left ventricular end 

diastolic pressure is 30 mmHg. 



Conclusion

Non obstructive CAD limited to Mid Cx 60-70% very large calibre vessel

Non ishemic CMP

EF-15-20%, EDP-30 mmof hg



Recommendations

Aggressive Medical Therapy

Weight Loss Reduction Program

Neuro Eval, 

wean off Vent as tolerated

Once stable Consider AICD in 3-6 months if EF remains  less than 35%.

Consider EP Eval if Neurogically remains intact.



Cc; Booker Prajapati MD

## 2018-10-04 NOTE — CT
Date of service: 



10/04/2018



PROCEDURE:  CT HEAD WITHOUT CONTRAST.



HISTORY:

s/p cardiac arrest



COMPARISON:

None available.



TECHNIQUE:

Axial computed tomography images were obtained through the head/brain 

without intravenous contrast.  



Radiation dose:



Total exam DLP = 987 mGy-cm.



This CT exam was performed using one or more of the following dose 

reduction techniques: Automated exposure control, adjustment of the 

mA and/or kV according to patient size, and/or use of iterative 

reconstruction technique.



FINDINGS:



HEMORRHAGE:

No intracranial hemorrhage. 



BRAIN:

No mass effect or edema.  No atrophy or chronic microvascular 

ischemic changes.



VENTRICLES:

Unremarkable. No hydrocephalus. 



CALVARIUM:

Unremarkable.



PARANASAL SINUSES:

Unremarkable as visualized. No significant inflammatory changes.



MASTOID AIR CELLS:

Unremarkable as visualized. No inflammatory changes.



OTHER FINDINGS:

None.



IMPRESSION:

No acute findings

## 2018-10-04 NOTE — PCM.EEG
Electroencephalogram Report





- Electroencephalogram Report


Procedure Date: 10/04/18


Interpretation: Video EEg so far is flat, with no normal rhythms, no seizures, 

no interictal epileptiform dischages, no seizures. There is no normal 

background, just ekg artifact.


Impression: EEG for >6 hours showing no normal cerebral activity. This could be 

a reflection of deep sedation , or be secondary to profound anoxic 

encephalopathy.

## 2018-10-05 LAB
ALBUMIN SERPL-MCNC: 3.7 G/DL (ref 3–4.8)
ALBUMIN/GLOB SERPL: 1.1 {RATIO} (ref 1.1–1.8)
ALT SERPL-CCNC: 28 U/L (ref 7–56)
ARTERIAL BLOOD GAS HEMOGLOBIN: 15.8 G/DL (ref 11.7–17.4)
ARTERIAL BLOOD GAS O2 SAT: 99.7 % (ref 95–98)
ARTERIAL BLOOD GAS PCO2: 29 MM/HG (ref 35–45)
ARTERIAL BLOOD GAS TCO2: 23.5 MMOL.L (ref 22–28)
AST SERPL-CCNC: 60 U/L (ref 14–36)
BASOPHILS # BLD AUTO: 0.01 K/MM3 (ref 0–2)
BASOPHILS NFR BLD: 0.1 % (ref 0–3)
BUN SERPL-MCNC: 18 MG/DL (ref 7–21)
BUN SERPL-MCNC: 20 MG/DL (ref 7–21)
CALCIUM SERPL-MCNC: 8.2 MG/DL (ref 8.4–10.5)
CALCIUM SERPL-MCNC: 8.8 MG/DL (ref 8.4–10.5)
EOSINOPHIL # BLD: 0 10*3/UL (ref 0–0.7)
EOSINOPHIL NFR BLD: 0 % (ref 1.5–5)
ERYTHROCYTE [DISTWIDTH] IN BLOOD BY AUTOMATED COUNT: 14.3 % (ref 11.5–14.5)
GFR NON-AFRICAN AMERICAN: > 60
GFR NON-AFRICAN AMERICAN: > 60
GRANULOCYTES # BLD: 15.17 10*3/UL (ref 1.4–6.5)
GRANULOCYTES NFR BLD: 84.5 % (ref 50–68)
HCO3 BLDA-SCNC: 22.6 MMOL/L (ref 21–28)
HDLC SERPL-MCNC: 35 MG/DL (ref 29–60)
HGB BLD-MCNC: 15.1 G/DL (ref 12–16)
INHALED O2 CONCENTRATION: 80 %
LDLC SERPL-MCNC: 71 MG/DL (ref 0–129)
LYMPHOCYTE: 7 % (ref 22–35)
LYMPHOCYTES # BLD: 0.8 10*3/UL (ref 1.2–3.4)
LYMPHOCYTES NFR BLD AUTO: 4.2 % (ref 22–35)
MCH RBC QN AUTO: 30.8 PG (ref 25–35)
MCHC RBC AUTO-ENTMCNC: 33.8 G/DL (ref 31–37)
MCV RBC AUTO: 91.2 FL (ref 80–105)
MONOCYTE: 1 % (ref 1–6)
MONOCYTES # BLD AUTO: 2 10*3/UL (ref 0.1–0.6)
MONOCYTES NFR BLD: 11.2 % (ref 1–6)
NEUTROPHILS NFR BLD AUTO: 89 % (ref 50–70)
NEUTS BAND NFR BLD: 3 % (ref 0–2)
O2 CAP BLDA-SCNC: 21.9 ML/DL (ref 16–24)
O2 CT BLDA-SCNC: 21.8 ML/DL (ref 15–23)
PH BLDA: 7.5 [PH] (ref 7.35–7.45)
PLATELET # BLD EST: NORMAL 10*3/UL
PLATELET # BLD: 180 10^3/UL (ref 120–450)
PMV BLD AUTO: 12.5 FL (ref 7–11)
PO2 BLDA: 151 MM/HG (ref 80–100)
RBC # BLD AUTO: 4.9 10^6/UL (ref 3.5–6.1)
WBC # BLD AUTO: 17.9 10^3/UL (ref 4.5–11)

## 2018-10-05 RX ADMIN — INSULIN HUMAN SCH: 100 INJECTION, SOLUTION PARENTERAL at 09:50

## 2018-10-05 RX ADMIN — Medication PRN MLS/HR: at 11:24

## 2018-10-05 RX ADMIN — METRONIDAZOLE SCH MLS/HR: 500 INJECTION, SOLUTION INTRAVENOUS at 21:09

## 2018-10-05 RX ADMIN — INSULIN HUMAN SCH: 100 INJECTION, SOLUTION PARENTERAL at 07:26

## 2018-10-05 RX ADMIN — INSULIN HUMAN SCH: 100 INJECTION, SOLUTION PARENTERAL at 21:16

## 2018-10-05 RX ADMIN — INSULIN HUMAN SCH: 100 INJECTION, SOLUTION PARENTERAL at 23:12

## 2018-10-05 RX ADMIN — LEVETIRACETAM SCH MLS/HR: 5 INJECTION INTRAVENOUS at 21:10

## 2018-10-05 RX ADMIN — LEVETIRACETAM SCH MLS/HR: 5 INJECTION INTRAVENOUS at 09:12

## 2018-10-05 RX ADMIN — CEFEPIME SCH MLS/HR: 1 INJECTION, SOLUTION INTRAVENOUS at 09:00

## 2018-10-05 RX ADMIN — METRONIDAZOLE SCH MLS/HR: 500 INJECTION, SOLUTION INTRAVENOUS at 05:45

## 2018-10-05 RX ADMIN — INSULIN HUMAN SCH: 100 INJECTION, SOLUTION PARENTERAL at 20:00

## 2018-10-05 RX ADMIN — INSULIN HUMAN SCH: 100 INJECTION, SOLUTION PARENTERAL at 05:48

## 2018-10-05 RX ADMIN — INSULIN HUMAN SCH: 100 INJECTION, SOLUTION PARENTERAL at 05:49

## 2018-10-05 RX ADMIN — VANCOMYCIN HYDROCHLORIDE SCH MLS/HR: 1 INJECTION, POWDER, LYOPHILIZED, FOR SOLUTION INTRAVENOUS at 21:47

## 2018-10-05 RX ADMIN — CEFEPIME SCH MLS/HR: 1 INJECTION, SOLUTION INTRAVENOUS at 14:22

## 2018-10-05 RX ADMIN — Medication PRN MLS/HR: at 23:23

## 2018-10-05 RX ADMIN — DOBUTAMINE HYDROCHLORIDE PRN MLS/HR: 200 INJECTION INTRAVENOUS at 15:04

## 2018-10-05 RX ADMIN — INSULIN HUMAN SCH: 100 INJECTION, SOLUTION PARENTERAL at 15:00

## 2018-10-05 RX ADMIN — CEFEPIME SCH MLS/HR: 1 INJECTION, SOLUTION INTRAVENOUS at 21:09

## 2018-10-05 RX ADMIN — INSULIN HUMAN SCH UNIT: 100 INJECTION, SOLUTION PARENTERAL at 11:56

## 2018-10-05 RX ADMIN — INSULIN HUMAN SCH: 100 INJECTION, SOLUTION PARENTERAL at 05:47

## 2018-10-05 RX ADMIN — INSULIN HUMAN SCH UNIT: 100 INJECTION, SOLUTION PARENTERAL at 14:00

## 2018-10-05 RX ADMIN — PROPOFOL PRN MLS/HR: 10 INJECTION, EMULSION INTRAVENOUS at 09:44

## 2018-10-05 RX ADMIN — INSULIN HUMAN SCH: 100 INJECTION, SOLUTION PARENTERAL at 18:00

## 2018-10-05 RX ADMIN — METRONIDAZOLE SCH MLS/HR: 500 INJECTION, SOLUTION INTRAVENOUS at 14:22

## 2018-10-05 NOTE — PN
DATE:  10/05/2018



REASON FOR CONSULTATION AND FOLLOWUP:  Rule out code STEMI, status post

collapse, VFib arrest, status post intubated, status post cardiac

catheterization.



SUBJECTIVE:  The patient remains intubated, minimal reflex, not responded

to verbal stimuli, not significant gag reflex either, cannot rule out

anoxic encephalopathy.



PHYSICAL EXAMINATION

VITAL SIGNS:  Temperature 86.7, hypothermic, on cooling blanket and

hypothermia protocol; heart rate _____, blood pressure 137/86.

HEENT:  PERRLA.  Extraocular muscles are intact.

NECK:  Supple.  No carotid bruits or thyromegaly.

CHEST:  Clear to auscultation.

HEART:  S1 and S2, regular.

ABDOMEN:  Soft.

EXTREMITIES:  Clubbing and cyanosis negative.



LABORATORY DATA:  Blood workup; WBC 17.9, hemoglobin 15.3, hematocrit 44.7,

and platelet count 180.  Chemistry; sodium of 132, potassium 3.8, chloride

97, carbon dioxide 26, anion gap of 15.  BUN 18 and creatinine 0.7.



IMPRESSION:  Status post collapse, status post ventricular tachycardia,

ventricular fibrillation arrest, status post intubated, rule out anoxic

encephalopathy, status post cardiac catheterization, nonobstructive

coronary artery disease, only circumflex has 60% to 70% stenosis, severely

decreased left ventricular function, ejection fraction 15% to 20% which was

the same as two months ago, rule out anoxic encephalopathy, discussed with

the family.



RECOMMENDATION:  Continue supportive care.  I will ask also Dr. Pryor to

look from EP point of view.  Continue amiodarone _____ mg until this

morning and then we will start 400 mg p.o. t.i.d. for 3 days, followed by

200 mg daily.  Continue DVT prophylaxis.  Discussed with Dr. Gaona. 

Does not need any heparin therapy because no significant CAD, no evidence

of MI.  We will follow with you.  Overall, the patient's condition is

critical.  Long-term prognosis is extremely poor.







__________________________________________

Booker Peck MD



DD:  10/05/2018 9:25:00

DT:  10/05/2018 12:55:49

Job # [id number]

## 2018-10-05 NOTE — CARD
--------------- APPROVED REPORT --------------





Date of service: 10/04/2018



EKG Measurement

Heart [id number]

[id number]

[id number]

[id number]

[id number]



<Conclusion>

Normal sinus rhythm

Possible Left atrial enlargement

Left ventricular hypertrophy with repolarization abnormality

Prolonged QT

Abnormal ECG

## 2018-10-05 NOTE — CP.CCUPN
<Eliu Neely - Last Filed: 10/05/18 12:43>





CCU Subjective





- Physician Review


Subjective (Free Text): 


Eliu Neely DO, PGY-1 ICU Progress Note for Dr. Alexander


Patient is a 58 year old female with PMH of DM2, HTN, COPD, and chronic AFib who

presented to ED following witnessed syncopal episode and cardiac arrest. The 

initial rhythm was found to be PEA. Compressions were started immediately per 

EMS. After 2 minutes of compressions and single dose of epinephrine, ROSC was a

chieved. She was subsequently admitted to ICU for further management. Cooling 

protocol and continuous EEG were started in ICU. 











CCU Objective





- Vital Signs / Intake & Output


Vital Signs (Last 4 hours): 


Vital Signs











  Temp Pulse BP Pulse Ox


 


 10/05/18 11:00  92.1 F L  83  116/85  91 L


 


 10/05/18 10:59    95/57 L 


 


 10/05/18 10:56  91.8 F L  69  95/57 L  94 L


 


 10/05/18 10:50  91.4 F L  65   95


 


 10/05/18 10:48  91.2 F L  70  90/54 L  95


 


 10/05/18 10:45  91.0 F L  67  86/54 L  94 L


 


 10/05/18 10:40  90.7 F L  66   95


 


 10/05/18 10:37  90.5 F L  65  88/49 L  95


 


 10/05/18 10:30  90.0 F L  72  89/41 L  94 L


 


 10/05/18 10:20  89.4 F L  61   96


 


 10/05/18 10:15  89.1 F L  62  97/47 L  95


 


 10/05/18 10:10  88.7 F L  62   96


 


 10/05/18 10:00  88.3 F L  63  98/34 L  96


 


 10/05/18 09:50  87.8 F L  57 L   96


 


 10/05/18 09:45  87.6 F L  60  103/56 L  96


 


 10/05/18 09:40  87.3 F L   102/71  95


 


 10/05/18 09:30  86.9 F L  57 L  91/65 L  93 L


 


 10/05/18 09:20  86.7 F L    93 L


 


 10/05/18 09:15  86.5 F L  54 L  97/68 L  94 L


 


 10/05/18 09:10  86.5 F L  53 L   95


 


 10/05/18 09:05  86.4 F L  53 L  105/76  97


 


 10/05/18 09:00  86.4 F L    90 L


 


 10/05/18 08:50  86.4 F L    96


 


 10/05/18 08:45    143/78 


 


 10/05/18 08:44  86.4 F L    94 L


 


 10/05/18 08:40  86.5 F L  49 L   95


 


 10/05/18 08:30    137/86 


 


 10/05/18 08:29  86.7 F L  51 L   95


 


 10/05/18 08:20  86.9 F L  48 L   98


 


 10/05/18 08:15    143/95 H 


 


 10/05/18 08:14  87.1 F L  47 L   97


 


 10/05/18 08:10  87.3 F L  45 L   97


 


 10/05/18 08:02  87.6 F L  45 L  145/84  96


 


 10/05/18 08:01  87.8 F L  47 L  182/87 H  97


 


 10/05/18 08:00  87.8 F L  47 L   94 L


 


 10/05/18 07:50  88.2 F L  48 L   97


 


 10/05/18 07:46  88.3 F L  47 L  163/97 H  98


 


 10/05/18 07:45  88.5 F L  47 L  


 


 10/05/18 07:40  88.7 F L  47 L   97











Intake and Output (Last 8hrs): 


                                 Intake & Output











 10/04/18 10/05/18 10/05/18





 22:59 06:59 14:59


 


Intake Total 100 475 221


 


Output Total 80 2020 


 


Balance 20 -1545 221


 


Intake:   


 


   475 221


 


    Right Hand  400 


 


  Oral  0 


 


Output:   


 


  Urine 80 1900 


 


    2-way Urethral 80 1900 


 


  Stool  120 


 


Other:   


 


  # Bowel Movements  0 














- Physical Exam


Head: Positive for: Atraumatic, Other (Intubated)


Pupils: Positive for: Non-Reactive


Conjunctiva: Positive for: Normal


Mouth: Positive for: Moist Mucous Membranes


Respiratory/Chest: Positive for: Clear to Auscultation, Good Air Exchange.  

Negative for: Respiratory Distress, Accessory Muscle Use, Wheezes, Rales, 

Rhonchi


Cardiovascular: Positive for: Peripheal Pulses Present, Bradycardic.  Negative 

for: Murmurs, Rub, Gallop


Abdomen: Negative for: Distention, Mass/Organomegaly


Upper Extremity: Positive for: Normal Inspection.  Negative for: Cyanosis, Edema


Lower Extremity: Positive for: Normal Inspection.  Negative for: Edema


Neurological: Positive for: Other (Gag, Doll's eyes, corneal, and pupillary 

reflexes all absent)


Skin: Positive for: Warm, Dry, Normal Color.  Negative for: Rashes





- Medications


Active Medications: 


Active Medications











Generic Name Dose Route Start Last Admin





  Trade Name Freq  PRN Reason Stop Dose Admin


 


Amiodarone HCl  400 mg  10/05/18 10:00  10/05/18 09:13





  Cordarone  PO  10/07/18 23:59  Not Given





  TID [facility]   





     





     





     





     


 


Amiodarone HCl  200 mg  10/08/18 10:00  





  Cordarone  PO   





  DAILY [facility]   





     





     





     





     


 


Aspirin  81 mg  10/05/18 06:00  10/05/18 10:56





  Aspirin Chewable  NG   81 mg





  DAILY JIM   Administration





     





     





     





     


 


Atorvastatin Calcium  80 mg  10/04/18 17:00  





  Lipitor  NG   





  DIN JIM   





     





     





     





     


 


Gemfibrozil  600 mg  10/05/18 10:15  10/05/18 10:59





  Lopid  PO   600 mg





  BID JIM   Administration





     





     





     





     


 


Heparin Sodium (Porcine)  5,000 units  10/04/18 20:00  10/05/18 05:44





  Heparin  SC   5,000 units





  Q8 JIM   Administration





     





     





  Protocol   





     


 


Dobutamine HCl/Dextrose  500 mg in 250 mls @ 7.62 mls/hr  10/04/18 11:18  

10/05/18 07:50





  Dobutamine/Dextrose 5% 500mg/250ml  IV   4 mcg/kg/min





  .Q24H PRN   15.241 mls/hr





  TITRATE PER PROTOCOL   Titration





     





  Protocol   





  2 MCG/KG/MIN   


 


Heparin Sodium/Sodium Chloride  25,000 units in 250 mls @ 15.241 mls/hr  

10/04/18 11:30  10/04/18 11:30





  Heparin 89434 Units/250ml 1/2 Normal Saline  IV   Not Given





  .[id number] JIM   





     





     





  Protocol   





  12 UNITS/KG/HR   


 


Levetiracetam  500 mg in 100 mls @ 400 mls/hr  10/04/18 11:30  10/05/18 09:12





  Keppra 500mg Ivpb  IVPB   400 mls/hr





  Q12 JIM   Administration





     





     





     





     


 


Cisatracurium Besylate 200 mg/  270 mls @ 4.66 mls/hr  10/04/18 16:05  10/05/18 

07:00





  Sodium Chloride  IV   0.5 mcg/kg/min





  .Q24H PRN   4.66 mls/hr





  TITRATE PER MD ORDER   Titration





     





  Protocol   





  0.5 MCG/KG/MIN   


 


Acetaminophen  1,000 mg in 100 mls @ 400 mls/hr  10/04/18 16:16  





  Ofirmev  IVPB  10/06/18 16:17  





  Q6H PRN   





  for T>100F   





     





     





     


 


Doxycycline Hyclate 100 mg/  100 mls @ 100 mls/hr  10/04/18 22:00  10/05/18 

10:45





  Sodium Chloride  IVPB   100 mls/hr





  Q12 JIM   Administration





     





     





  Protocol   





     


 


Metronidazole  500 mg in 100 mls @ 100 mls/hr  10/04/18 22:00  10/05/18 05:45





  Flagyl  IVPB   100 mls/hr





  Q8 JIM   Administration





     





     





  Protocol   





     


 


Amiodarone HCl/Dextrose  360 mg in 200 mls @ 16.667 mls/hr  10/04/18 19:45  

10/04/18 21:53





  Nexterone 360 Mg In D5w 200 Ml (Premix)  IV   16.667 mls/hr





  .Q12H JIM   Administration





     





     





  Protocol   





  0.5 MG/MIN   


 


Cefepime HCl  2 gm in 100 mls @ 100 mls/hr  10/05/18 08:00  10/05/18 09:00





  Maxipime 2gm  IVPB  10/10/18 08:01  100 mls/hr





  Q8 JIM   Administration





     





     





  Protocol   





     


 


Fentanyl Citrate  1,000 mcg in 100 mls @ 10 mls/hr  10/05/18 10:05  10/05/18 

11:24





  Fentanyl Citrate/Sodium Chloride 1 Mg/100 Ml  IV   100 mcg/hr





  .Q10H PRN   10 mls/hr





  TITRATE PER MD ORDER   Administration





     





  Protocol   





  100 MCG/HR   


 


Midazolam 100 mg/100ml in NS  100 mg in 100 mls @ 1 mls/hr  10/05/18 10:06  

10/05/18 11:00





  Midazolam 100 Mg/100ml In Ns  IV   1 mg/hr





  .Q24H PRN   1 mls/hr





  Sedation   Administration





     





  Protocol   





  1 MG/HR   


 


Insulin Human Regular  0 units  10/04/18 13:00  10/05/18 07:26





  Humulin R Med  SC   Not Given





  Q2H JIM   





     





     





  Protocol   





     


 


Pantoprazole Sodium  40 mg  10/04/18 11:30  10/05/18 09:12





  Protonix Inj  IVP   40 mg





  DAILY JIM   Administration





     





     





     





     














- Patient Studies


Lab Studies: 


                                   Lab Studies











  10/05/18 10/05/18 10/05/18 Range/Units





  07:33 06:00 05:30 


 


WBC     (4.5-11.0)  10^3/ul


 


RBC     (3.5-6.1)  10^6/uL


 


Hgb     (12.0-16.0)  g/dL


 


Hct     (36.0-48.0)  %


 


MCV     (80.0-105.0)  fl


 


MCH     (25.0-35.0)  pg


 


MCHC     (31.0-37.0)  g/dl


 


RDW     (11.5-14.5)  %


 


Plt Count     (120.0-450.0)  10^3/uL


 


MPV     (7.0-11.0)  fl


 


Gran %     (50.0-68.0)  %


 


Lymph % (Auto)     (22.0-35.0)  %


 


Mono % (Auto)     (1.0-6.0)  %


 


Eos % (Auto)     (1.5-5.0)  %


 


Baso % (Auto)     (0.0-3.0)  %


 


Gran #     (1.4-6.5)  


 


Lymph # (Auto)     (1.2-3.4)  


 


Mono # (Auto)     (0.1-0.6)  


 


Eos # (Auto)     (0.0-0.7)  


 


Baso # (Auto)     (0.0-2.0)  K/mm3


 


Neutrophils % (Manual)     (50.0-70.0)  %


 


Band Neutrophils %     (0-2)  %


 


Lymphocytes % (Manual)     (22.0-35.0)  %


 


Monocytes % (Manual)     (1.0-6.0)  %


 


Platelet Evaluation     (NORMAL)  


 


pCO2   29 L   (35-45)  mm/Hg


 


pO2   151.0 H   ()  mm/Hg


 


HCO3   22.6   (21-28)  mmol/L


 


ABG pH   7.50 H   (7.35-7.45)  


 


ABG Total CO2   23.5   (22-28)  mmol.L


 


ABG O2 Saturation   99.7 H   (95-98)  %


 


ABG O2 Content   21.8   (15-23)  ML/dl


 


ABG Base Excess   0.6   (-2.0-3.0)  mmol/L


 


ABG Hemoglobin   15.8   (11.7-17.4)  g/dL


 


ABG Carboxyhemoglobin   1.5   (0.5-1.5)  %


 


POC ABG HHb (Measured)   0.3   (0-5)  %


 


ABG Methemoglobin   1.1   (0.0-3.0)  %


 


ABG O2 Capacity   21.9   (16-24)  mL/dl


 


ABG Potassium     (3.6-5.2)  mmol/L


 


VBG pH     (7.32-7.43)  


 


VBG pCO2     (40-60)  


 


VBG HCO3     (21-28)  mmol/l


 


VBG Total CO2     (22-28)  mmol.L


 


VBG O2 Sat (Calc)     (40-65)  %


 


VBG Base Excess     (0.0-2.0)  mmol/L


 


VBG Potassium     (3.6-5.2)  mmol/L


 


Hgb O2 Saturation   97.1   (95.0-98.0)  %


 


Sodium    133  (132-148)  mmol/L


 


Chloride    97 L  ()  mmol/L


 


Glucose     ()  mg/dl


 


Lactate     (0.7-2.1)  mmol/L


 


Mechanical Rate     


 


FiO2   80.0   %


 


Tidal Volume     


 


PEEP     


 


Potassium    3.8  (3.6-5.0)  mmol/L


 


Carbon Dioxide    26  (21-33)  mmol/L


 


Anion Gap    15  (10-20)  


 


BUN    18  (7-21)  mg/dL


 


Creatinine    0.7  (0.7-1.2)  mg/dl


 


Est GFR (African Amer)    > 60  


 


Est GFR (Non-Af Amer)    > 60  


 


POC Glucose (mg/dL)  195 H    ()  mg/dL


 


Random Glucose    134 H  ()  mg/dL


 


Calcium    8.2 L  (8.4-10.5)  mg/dL


 


Phosphorus    3.6  (2.5-4.5)  mg/dL


 


Magnesium    1.7  (1.7-2.2)  mg/dL


 


Total Bilirubin    1.3  (0.2-1.3)  mg/dL


 


AST    60 H D  (14-36)  U/L


 


ALT    28  (7-56)  U/L


 


Alkaline Phosphatase    62  ()  U/L


 


Troponin I     ng/mL


 


Total Protein    7.3  (5.8-8.3)  g/dL


 


Albumin    3.7  (3.0-4.8)  g/dL


 


Globulin    3.5  gm/dL


 


Albumin/Globulin Ratio    1.1  (1.1-1.8)  


 


Triglycerides    1994 H  ()  mg/dL


 


Cholesterol    160  (130-200)  mg/dL


 


LDL Cholesterol Direct    71  (0-129)  mg/dL


 


HDL Cholesterol    35  (29-60)  mg/dL


 


Procalcitonin     (0.19-0.49)  NG/ML


 


Arterial Blood Potassium     (3.6-5.2)  mmol/L


 


Venous Blood Potassium     (3.6-5.2)  mmol/L


 


Urine Color     (YELLOW)  


 


Urine Appearance     (CLEAR)  


 


Urine pH     (4.7-8.0)  


 


Ur Specific Gravity     (1.005-1.035)  


 


Urine Protein     (<30 mg/dL)  mg/dL


 


Urine Glucose (UA)     (NEGATIVE)  mg/dL


 


Urine Ketones     (NEGATIVE)  mg/dL


 


Urine Blood     (NEGATIVE)  


 


Urine Nitrate     (NEGATIVE)  


 


Urine Bilirubin     (NEGATIVE)  


 


Urine Urobilinogen     (<1 E.U./dL)  E.U./dL


 


Ur Leukocyte Esterase     (NEGATIVE)  Alaina/uL


 


Urine RBC     (0-2)  /hpf


 


Urine WBC     (0-6)  /hpf


 


Ur Epithelial Cells     (0-5)  /hpf


 


Amorphous Sediment     


 


Urine Bacteria     (NEG)  


 


Hyaline Casts     /hpf


 


Fine Granular Casts     (0-2)  /hpf


 


Coarse Granular Casts     (0-2)  /hpf


 


Urine Other     


 


Digoxin     (0.8-2.0)  ng/mL














  10/05/18 10/05/18 10/05/18 Range/Units





  05:30 05:29 03:37 


 


WBC  17.9 H    (4.5-11.0)  10^3/ul


 


RBC  4.90    (3.5-6.1)  10^6/uL


 


Hgb  15.1    (12.0-16.0)  g/dL


 


Hct  44.7    (36.0-48.0)  %


 


MCV  91.2    (80.0-105.0)  fl


 


MCH  30.8    (25.0-35.0)  pg


 


MCHC  33.8    (31.0-37.0)  g/dl


 


RDW  14.3    (11.5-14.5)  %


 


Plt Count  180    (120.0-450.0)  10^3/uL


 


MPV  12.5 H    (7.0-11.0)  fl


 


Gran %  84.5 H    (50.0-68.0)  %


 


Lymph % (Auto)  4.2 L    (22.0-35.0)  %


 


Mono % (Auto)  11.2 H    (1.0-6.0)  %


 


Eos % (Auto)  0.0 L    (1.5-5.0)  %


 


Baso % (Auto)  0.1    (0.0-3.0)  %


 


Gran #  15.17 H    (1.4-6.5)  


 


Lymph # (Auto)  0.8 L    (1.2-3.4)  


 


Mono # (Auto)  2.0 H    (0.1-0.6)  


 


Eos # (Auto)  0.0    (0.0-0.7)  


 


Baso # (Auto)  0.01    (0.0-2.0)  K/mm3


 


Neutrophils % (Manual)  89 H    (50.0-70.0)  %


 


Band Neutrophils %  3 H    (0-2)  %


 


Lymphocytes % (Manual)  7 L    (22.0-35.0)  %


 


Monocytes % (Manual)  1    (1.0-6.0)  %


 


Platelet Evaluation  Normal    (NORMAL)  


 


pCO2     (35-45)  mm/Hg


 


pO2     ()  mm/Hg


 


HCO3     (21-28)  mmol/L


 


ABG pH     (7.35-7.45)  


 


ABG Total CO2     (22-28)  mmol.L


 


ABG O2 Saturation     (95-98)  %


 


ABG O2 Content     (15-23)  ML/dl


 


ABG Base Excess     (-2.0-3.0)  mmol/L


 


ABG Hemoglobin     (11.7-17.4)  g/dL


 


ABG Carboxyhemoglobin     (0.5-1.5)  %


 


POC ABG HHb (Measured)     (0-5)  %


 


ABG Methemoglobin     (0.0-3.0)  %


 


ABG O2 Capacity     (16-24)  mL/dl


 


ABG Potassium     (3.6-5.2)  mmol/L


 


VBG pH     (7.32-7.43)  


 


VBG pCO2     (40-60)  


 


VBG HCO3     (21-28)  mmol/l


 


VBG Total CO2     (22-28)  mmol.L


 


VBG O2 Sat (Calc)     (40-65)  %


 


VBG Base Excess     (0.0-2.0)  mmol/L


 


VBG Potassium     (3.6-5.2)  mmol/L


 


Hgb O2 Saturation     (95.0-98.0)  %


 


Sodium     (132-148)  mmol/L


 


Chloride     ()  mmol/L


 


Glucose     ()  mg/dl


 


Lactate     (0.7-2.1)  mmol/L


 


Mechanical Rate     


 


FiO2     %


 


Tidal Volume     


 


PEEP     


 


Potassium     (3.6-5.0)  mmol/L


 


Carbon Dioxide     (21-33)  mmol/L


 


Anion Gap     (10-20)  


 


BUN     (7-21)  mg/dL


 


Creatinine     (0.7-1.2)  mg/dl


 


Est GFR (African Amer)     


 


Est GFR (Non-Af Amer)     


 


POC Glucose (mg/dL)   161 H  132 H  ()  mg/dL


 


Random Glucose     ()  mg/dL


 


Calcium     (8.4-10.5)  mg/dL


 


Phosphorus     (2.5-4.5)  mg/dL


 


Magnesium     (1.7-2.2)  mg/dL


 


Total Bilirubin     (0.2-1.3)  mg/dL


 


AST     (14-36)  U/L


 


ALT     (7-56)  U/L


 


Alkaline Phosphatase     ()  U/L


 


Troponin I     ng/mL


 


Total Protein     (5.8-8.3)  g/dL


 


Albumin     (3.0-4.8)  g/dL


 


Globulin     gm/dL


 


Albumin/Globulin Ratio     (1.1-1.8)  


 


Triglycerides     ()  mg/dL


 


Cholesterol     (130-200)  mg/dL


 


LDL Cholesterol Direct     (0-129)  mg/dL


 


HDL Cholesterol     (29-60)  mg/dL


 


Procalcitonin     (0.19-0.49)  NG/ML


 


Arterial Blood Potassium     (3.6-5.2)  mmol/L


 


Venous Blood Potassium     (3.6-5.2)  mmol/L


 


Urine Color     (YELLOW)  


 


Urine Appearance     (CLEAR)  


 


Urine pH     (4.7-8.0)  


 


Ur Specific Gravity     (1.005-1.035)  


 


Urine Protein     (<30 mg/dL)  mg/dL


 


Urine Glucose (UA)     (NEGATIVE)  mg/dL


 


Urine Ketones     (NEGATIVE)  mg/dL


 


Urine Blood     (NEGATIVE)  


 


Urine Nitrate     (NEGATIVE)  


 


Urine Bilirubin     (NEGATIVE)  


 


Urine Urobilinogen     (<1 E.U./dL)  E.U./dL


 


Ur Leukocyte Esterase     (NEGATIVE)  Alaina/uL


 


Urine RBC     (0-2)  /hpf


 


Urine WBC     (0-6)  /hpf


 


Ur Epithelial Cells     (0-5)  /hpf


 


Amorphous Sediment     


 


Urine Bacteria     (NEG)  


 


Hyaline Casts     /hpf


 


Fine Granular Casts     (0-2)  /hpf


 


Coarse Granular Casts     (0-2)  /hpf


 


Urine Other     


 


Digoxin     (0.8-2.0)  ng/mL














  10/05/18 10/04/18 10/04/18 Range/Units





  00:05 23:22 22:40 


 


WBC    15.5 H D  (4.5-11.0)  10^3/ul


 


RBC    4.70  (3.5-6.1)  10^6/uL


 


Hgb    14.3  (12.0-16.0)  g/dL


 


Hct    42.5  (36.0-48.0)  %


 


MCV    90.4  D  (80.0-105.0)  fl


 


MCH    30.4  (25.0-35.0)  pg


 


MCHC    33.6  (31.0-37.0)  g/dl


 


RDW    14.2  (11.5-14.5)  %


 


Plt Count    99 L  (120.0-450.0)  10^3/uL


 


MPV    12.8 H  (7.0-11.0)  fl


 


Gran %    87.3 H  (50.0-68.0)  %


 


Lymph % (Auto)    5.6 L  (22.0-35.0)  %


 


Mono % (Auto)    7.0 H  (1.0-6.0)  %


 


Eos % (Auto)    0.0 L  (1.5-5.0)  %


 


Baso % (Auto)    0.1  (0.0-3.0)  %


 


Gran #    13.50 H  (1.4-6.5)  


 


Lymph # (Auto)    0.9 L  (1.2-3.4)  


 


Mono # (Auto)    1.1 H  (0.1-0.6)  


 


Eos # (Auto)    0.0  (0.0-0.7)  


 


Baso # (Auto)    0.01  (0.0-2.0)  K/mm3


 


Neutrophils % (Manual)     (50.0-70.0)  %


 


Band Neutrophils %     (0-2)  %


 


Lymphocytes % (Manual)     (22.0-35.0)  %


 


Monocytes % (Manual)     (1.0-6.0)  %


 


Platelet Evaluation     (NORMAL)  


 


pCO2     (35-45)  mm/Hg


 


pO2     ()  mm/Hg


 


HCO3     (21-28)  mmol/L


 


ABG pH     (7.35-7.45)  


 


ABG Total CO2     (22-28)  mmol.L


 


ABG O2 Saturation     (95-98)  %


 


ABG O2 Content     (15-23)  ML/dl


 


ABG Base Excess     (-2.0-3.0)  mmol/L


 


ABG Hemoglobin     (11.7-17.4)  g/dL


 


ABG Carboxyhemoglobin     (0.5-1.5)  %


 


POC ABG HHb (Measured)     (0-5)  %


 


ABG Methemoglobin     (0.0-3.0)  %


 


ABG O2 Capacity     (16-24)  mL/dl


 


ABG Potassium     (3.6-5.2)  mmol/L


 


VBG pH     (7.32-7.43)  


 


VBG pCO2     (40-60)  


 


VBG HCO3     (21-28)  mmol/l


 


VBG Total CO2     (22-28)  mmol.L


 


VBG O2 Sat (Calc)     (40-65)  %


 


VBG Base Excess     (0.0-2.0)  mmol/L


 


VBG Potassium     (3.6-5.2)  mmol/L


 


Hgb O2 Saturation     (95.0-98.0)  %


 


Sodium  137    (132-148)  mmol/L


 


Chloride  97 L    ()  mmol/L


 


Glucose     ()  mg/dl


 


Lactate     (0.7-2.1)  mmol/L


 


Mechanical Rate     


 


FiO2     %


 


Tidal Volume     


 


PEEP     


 


Potassium  3.8    (3.6-5.0)  mmol/L


 


Carbon Dioxide  29    (21-33)  mmol/L


 


Anion Gap  15    (10-20)  


 


BUN  20    (7-21)  mg/dL


 


Creatinine  0.8    (0.7-1.2)  mg/dl


 


Est GFR (African Amer)  > 60    


 


Est GFR (Non-Af Amer)  > 60    


 


POC Glucose (mg/dL)   150 H   ()  mg/dL


 


Random Glucose  134 H    ()  mg/dL


 


Calcium  8.8    (8.4-10.5)  mg/dL


 


Phosphorus  4.6 H    (2.5-4.5)  mg/dL


 


Magnesium  1.8    (1.7-2.2)  mg/dL


 


Total Bilirubin     (0.2-1.3)  mg/dL


 


AST     (14-36)  U/L


 


ALT     (7-56)  U/L


 


Alkaline Phosphatase     ()  U/L


 


Troponin I     ng/mL


 


Total Protein     (5.8-8.3)  g/dL


 


Albumin     (3.0-4.8)  g/dL


 


Globulin     gm/dL


 


Albumin/Globulin Ratio     (1.1-1.8)  


 


Triglycerides     ()  mg/dL


 


Cholesterol     (130-200)  mg/dL


 


LDL Cholesterol Direct     (0-129)  mg/dL


 


HDL Cholesterol     (29-60)  mg/dL


 


Procalcitonin     (0.19-0.49)  NG/ML


 


Arterial Blood Potassium     (3.6-5.2)  mmol/L


 


Venous Blood Potassium     (3.6-5.2)  mmol/L


 


Urine Color     (YELLOW)  


 


Urine Appearance     (CLEAR)  


 


Urine pH     (4.7-8.0)  


 


Ur Specific Gravity     (1.005-1.035)  


 


Urine Protein     (<30 mg/dL)  mg/dL


 


Urine Glucose (UA)     (NEGATIVE)  mg/dL


 


Urine Ketones     (NEGATIVE)  mg/dL


 


Urine Blood     (NEGATIVE)  


 


Urine Nitrate     (NEGATIVE)  


 


Urine Bilirubin     (NEGATIVE)  


 


Urine Urobilinogen     (<1 E.U./dL)  E.U./dL


 


Ur Leukocyte Esterase     (NEGATIVE)  Alaina/uL


 


Urine RBC     (0-2)  /hpf


 


Urine WBC     (0-6)  /hpf


 


Ur Epithelial Cells     (0-5)  /hpf


 


Amorphous Sediment     


 


Urine Bacteria     (NEG)  


 


Hyaline Casts     /hpf


 


Fine Granular Casts     (0-2)  /hpf


 


Coarse Granular Casts     (0-2)  /hpf


 


Urine Other     


 


Digoxin     (0.8-2.0)  ng/mL














  10/04/18 10/04/18 10/04/18 Range/Units





  21:21 17:32 16:50 


 


WBC     (4.5-11.0)  10^3/ul


 


RBC     (3.5-6.1)  10^6/uL


 


Hgb     (12.0-16.0)  g/dL


 


Hct     (36.0-48.0)  %


 


MCV     (80.0-105.0)  fl


 


MCH     (25.0-35.0)  pg


 


MCHC     (31.0-37.0)  g/dl


 


RDW     (11.5-14.5)  %


 


Plt Count     (120.0-450.0)  10^3/uL


 


MPV     (7.0-11.0)  fl


 


Gran %     (50.0-68.0)  %


 


Lymph % (Auto)     (22.0-35.0)  %


 


Mono % (Auto)     (1.0-6.0)  %


 


Eos % (Auto)     (1.5-5.0)  %


 


Baso % (Auto)     (0.0-3.0)  %


 


Gran #     (1.4-6.5)  


 


Lymph # (Auto)     (1.2-3.4)  


 


Mono # (Auto)     (0.1-0.6)  


 


Eos # (Auto)     (0.0-0.7)  


 


Baso # (Auto)     (0.0-2.0)  K/mm3


 


Neutrophils % (Manual)     (50.0-70.0)  %


 


Band Neutrophils %     (0-2)  %


 


Lymphocytes % (Manual)     (22.0-35.0)  %


 


Monocytes % (Manual)     (1.0-6.0)  %


 


Platelet Evaluation     (NORMAL)  


 


pCO2     (35-45)  mm/Hg


 


pO2     ()  mm/Hg


 


HCO3     (21-28)  mmol/L


 


ABG pH     (7.35-7.45)  


 


ABG Total CO2     (22-28)  mmol.L


 


ABG O2 Saturation     (95-98)  %


 


ABG O2 Content     (15-23)  ML/dl


 


ABG Base Excess     (-2.0-3.0)  mmol/L


 


ABG Hemoglobin     (11.7-17.4)  g/dL


 


ABG Carboxyhemoglobin     (0.5-1.5)  %


 


POC ABG HHb (Measured)     (0-5)  %


 


ABG Methemoglobin     (0.0-3.0)  %


 


ABG O2 Capacity     (16-24)  mL/dl


 


ABG Potassium     (3.6-5.2)  mmol/L


 


VBG pH     (7.32-7.43)  


 


VBG pCO2     (40-60)  


 


VBG HCO3     (21-28)  mmol/l


 


VBG Total CO2     (22-28)  mmol.L


 


VBG O2 Sat (Calc)     (40-65)  %


 


VBG Base Excess     (0.0-2.0)  mmol/L


 


VBG Potassium     (3.6-5.2)  mmol/L


 


Hgb O2 Saturation     (95.0-98.0)  %


 


Sodium    138  (132-148)  mmol/L


 


Chloride    101  ()  mmol/L


 


Glucose     ()  mg/dl


 


Lactate     (0.7-2.1)  mmol/L


 


Mechanical Rate     


 


FiO2     %


 


Tidal Volume     


 


PEEP     


 


Potassium    3.7  (3.6-5.0)  mmol/L


 


Carbon Dioxide    26  (21-33)  mmol/L


 


Anion Gap    15  (10-20)  


 


BUN    22 H  (7-21)  mg/dL


 


Creatinine    0.9  (0.7-1.2)  mg/dl


 


Est GFR (African Amer)    > 60  


 


Est GFR (Non-Af Amer)    > 60  


 


POC Glucose (mg/dL)  135 H  130 H   ()  mg/dL


 


Random Glucose    138 H  ()  mg/dL


 


Calcium    9.0  (8.4-10.5)  mg/dL


 


Phosphorus    3.6  (2.5-4.5)  mg/dL


 


Magnesium    1.9  (1.7-2.2)  mg/dL


 


Total Bilirubin    0.6  (0.2-1.3)  mg/dL


 


AST    38 H  (14-36)  U/L


 


ALT    39  (7-56)  U/L


 


Alkaline Phosphatase    69  ()  U/L


 


Troponin I    0.08  D  ng/mL


 


Total Protein    7.5  (5.8-8.3)  g/dL


 


Albumin    4.2  (3.0-4.8)  g/dL


 


Globulin    3.3  gm/dL


 


Albumin/Globulin Ratio    1.2  (1.1-1.8)  


 


Triglycerides     ()  mg/dL


 


Cholesterol     (130-200)  mg/dL


 


LDL Cholesterol Direct     (0-129)  mg/dL


 


HDL Cholesterol     (29-60)  mg/dL


 


Procalcitonin     (0.19-0.49)  NG/ML


 


Arterial Blood Potassium     (3.6-5.2)  mmol/L


 


Venous Blood Potassium     (3.6-5.2)  mmol/L


 


Urine Color     (YELLOW)  


 


Urine Appearance     (CLEAR)  


 


Urine pH     (4.7-8.0)  


 


Ur Specific Gravity     (1.005-1.035)  


 


Urine Protein     (<30 mg/dL)  mg/dL


 


Urine Glucose (UA)     (NEGATIVE)  mg/dL


 


Urine Ketones     (NEGATIVE)  mg/dL


 


Urine Blood     (NEGATIVE)  


 


Urine Nitrate     (NEGATIVE)  


 


Urine Bilirubin     (NEGATIVE)  


 


Urine Urobilinogen     (<1 E.U./dL)  E.U./dL


 


Ur Leukocyte Esterase     (NEGATIVE)  Alaina/uL


 


Urine RBC     (0-2)  /hpf


 


Urine WBC     (0-6)  /hpf


 


Ur Epithelial Cells     (0-5)  /hpf


 


Amorphous Sediment     


 


Urine Bacteria     (NEG)  


 


Hyaline Casts     /hpf


 


Fine Granular Casts     (0-2)  /hpf


 


Coarse Granular Casts     (0-2)  /hpf


 


Urine Other     


 


Digoxin     (0.8-2.0)  ng/mL














  10/04/18 10/04/18 10/04/18 Range/Units





  16:50 16:50 16:50 


 


WBC     (4.5-11.0)  10^3/ul


 


RBC     (3.5-6.1)  10^6/uL


 


Hgb     (12.0-16.0)  g/dL


 


Hct     (36.0-48.0)  %


 


MCV     (80.0-105.0)  fl


 


MCH     (25.0-35.0)  pg


 


MCHC     (31.0-37.0)  g/dl


 


RDW     (11.5-14.5)  %


 


Plt Count     (120.0-450.0)  10^3/uL


 


MPV     (7.0-11.0)  fl


 


Gran %     (50.0-68.0)  %


 


Lymph % (Auto)     (22.0-35.0)  %


 


Mono % (Auto)     (1.0-6.0)  %


 


Eos % (Auto)     (1.5-5.0)  %


 


Baso % (Auto)     (0.0-3.0)  %


 


Gran #     (1.4-6.5)  


 


Lymph # (Auto)     (1.2-3.4)  


 


Mono # (Auto)     (0.1-0.6)  


 


Eos # (Auto)     (0.0-0.7)  


 


Baso # (Auto)     (0.0-2.0)  K/mm3


 


Neutrophils % (Manual)     (50.0-70.0)  %


 


Band Neutrophils %     (0-2)  %


 


Lymphocytes % (Manual)     (22.0-35.0)  %


 


Monocytes % (Manual)     (1.0-6.0)  %


 


Platelet Evaluation     (NORMAL)  


 


pCO2     (35-45)  mm/Hg


 


pO2   52   ()  mm/Hg


 


HCO3     (21-28)  mmol/L


 


ABG pH     (7.35-7.45)  


 


ABG Total CO2     (22-28)  mmol.L


 


ABG O2 Saturation     (95-98)  %


 


ABG O2 Content     (15-23)  ML/dl


 


ABG Base Excess     (-2.0-3.0)  mmol/L


 


ABG Hemoglobin     (11.7-17.4)  g/dL


 


ABG Carboxyhemoglobin     (0.5-1.5)  %


 


POC ABG HHb (Measured)     (0-5)  %


 


ABG Methemoglobin     (0.0-3.0)  %


 


ABG O2 Capacity     (16-24)  mL/dl


 


ABG Potassium     (3.6-5.2)  mmol/L


 


VBG pH   7.39   (7.32-7.43)  


 


VBG pCO2   47.0   (40-60)  


 


VBG HCO3   28.5 H   (21-28)  mmol/l


 


VBG Total CO2   29.9 H   (22-28)  mmol.L


 


VBG O2 Sat (Calc)   89.4 H   (40-65)  %


 


VBG Base Excess   2.8 H   (0.0-2.0)  mmol/L


 


VBG Potassium   3.6   (3.6-5.2)  mmol/L


 


Hgb O2 Saturation     (95.0-98.0)  %


 


Sodium   138.0   (132-148)  mmol/L


 


Chloride   101.0   ()  mmol/L


 


Glucose   136 H   ()  mg/dl


 


Lactate   1.6   (0.7-2.1)  mmol/L


 


Mechanical Rate     


 


FiO2   21.0   %


 


Tidal Volume     


 


PEEP     


 


Potassium     (3.6-5.0)  mmol/L


 


Carbon Dioxide     (21-33)  mmol/L


 


Anion Gap     (10-20)  


 


BUN     (7-21)  mg/dL


 


Creatinine     (0.7-1.2)  mg/dl


 


Est GFR (African Amer)     


 


Est GFR (Non-Af Amer)     


 


POC Glucose (mg/dL)     ()  mg/dL


 


Random Glucose     ()  mg/dL


 


Calcium     (8.4-10.5)  mg/dL


 


Phosphorus     (2.5-4.5)  mg/dL


 


Magnesium     (1.7-2.2)  mg/dL


 


Total Bilirubin     (0.2-1.3)  mg/dL


 


AST     (14-36)  U/L


 


ALT     (7-56)  U/L


 


Alkaline Phosphatase     ()  U/L


 


Troponin I     ng/mL


 


Total Protein     (5.8-8.3)  g/dL


 


Albumin     (3.0-4.8)  g/dL


 


Globulin     gm/dL


 


Albumin/Globulin Ratio     (1.1-1.8)  


 


Triglycerides     ()  mg/dL


 


Cholesterol     (130-200)  mg/dL


 


LDL Cholesterol Direct     (0-129)  mg/dL


 


HDL Cholesterol     (29-60)  mg/dL


 


Procalcitonin    0.22  (0.19-0.49)  NG/ML


 


Arterial Blood Potassium     (3.6-5.2)  mmol/L


 


Venous Blood Potassium   3.6   (3.6-5.2)  mmol/L


 


Urine Color     (YELLOW)  


 


Urine Appearance     (CLEAR)  


 


Urine pH     (4.7-8.0)  


 


Ur Specific Gravity     (1.005-1.035)  


 


Urine Protein     (<30 mg/dL)  mg/dL


 


Urine Glucose (UA)     (NEGATIVE)  mg/dL


 


Urine Ketones     (NEGATIVE)  mg/dL


 


Urine Blood     (NEGATIVE)  


 


Urine Nitrate     (NEGATIVE)  


 


Urine Bilirubin     (NEGATIVE)  


 


Urine Urobilinogen     (<1 E.U./dL)  E.U./dL


 


Ur Leukocyte Esterase     (NEGATIVE)  Alaina/uL


 


Urine RBC     (0-2)  /hpf


 


Urine WBC     (0-6)  /hpf


 


Ur Epithelial Cells     (0-5)  /hpf


 


Amorphous Sediment     


 


Urine Bacteria     (NEG)  


 


Hyaline Casts     /hpf


 


Fine Granular Casts     (0-2)  /hpf


 


Coarse Granular Casts     (0-2)  /hpf


 


Urine Other     


 


Digoxin  < 0.4 L    (0.8-2.0)  ng/mL














  10/04/18 10/04/18 10/04/18 Range/Units





  16:15 13:33 12:02 


 


WBC     (4.5-11.0)  10^3/ul


 


RBC     (3.5-6.1)  10^6/uL


 


Hgb     (12.0-16.0)  g/dL


 


Hct     (36.0-48.0)  %


 


MCV     (80.0-105.0)  fl


 


MCH     (25.0-35.0)  pg


 


MCHC     (31.0-37.0)  g/dl


 


RDW     (11.5-14.5)  %


 


Plt Count     (120.0-450.0)  10^3/uL


 


MPV     (7.0-11.0)  fl


 


Gran %     (50.0-68.0)  %


 


Lymph % (Auto)     (22.0-35.0)  %


 


Mono % (Auto)     (1.0-6.0)  %


 


Eos % (Auto)     (1.5-5.0)  %


 


Baso % (Auto)     (0.0-3.0)  %


 


Gran #     (1.4-6.5)  


 


Lymph # (Auto)     (1.2-3.4)  


 


Mono # (Auto)     (0.1-0.6)  


 


Eos # (Auto)     (0.0-0.7)  


 


Baso # (Auto)     (0.0-2.0)  K/mm3


 


Neutrophils % (Manual)     (50.0-70.0)  %


 


Band Neutrophils %     (0-2)  %


 


Lymphocytes % (Manual)     (22.0-35.0)  %


 


Monocytes % (Manual)     (1.0-6.0)  %


 


Platelet Evaluation     (NORMAL)  


 


pCO2  42   53 H  (35-45)  mm/Hg


 


pO2  62.0 L   66.0 L  ()  mm/Hg


 


HCO3  26.6   24.9  (21-28)  mmol/L


 


ABG pH  7.41   7.28 L  (7.35-7.45)  


 


ABG Total CO2  27.9   26.5  (22-28)  mmol.L


 


ABG O2 Saturation  94.5 L   94.6 L  (95-98)  %


 


ABG O2 Content     (15-23)  ML/dl


 


ABG Base Excess  1.7   -2.6 L  (-2.0-3.0)  mmol/L


 


ABG Hemoglobin     (11.7-17.4)  g/dL


 


ABG Carboxyhemoglobin     (0.5-1.5)  %


 


POC ABG HHb (Measured)     (0-5)  %


 


ABG Methemoglobin     (0.0-3.0)  %


 


ABG O2 Capacity     (16-24)  mL/dl


 


ABG Potassium  3.3 L   4.2  (3.6-5.2)  mmol/L


 


VBG pH     (7.32-7.43)  


 


VBG pCO2     (40-60)  


 


VBG HCO3     (21-28)  mmol/l


 


VBG Total CO2     (22-28)  mmol.L


 


VBG O2 Sat (Calc)     (40-65)  %


 


VBG Base Excess     (0.0-2.0)  mmol/L


 


VBG Potassium     (3.6-5.2)  mmol/L


 


Hgb O2 Saturation     (95.0-98.0)  %


 


Sodium  137.0   134.0  (132-148)  mmol/L


 


Chloride  103.0   101.0  ()  mmol/L


 


Glucose  134 H   284 H  ()  mg/dl


 


Lactate  1.4   2.2 H  (0.7-2.1)  mmol/L


 


Mechanical Rate  30   20  


 


FiO2  80.0   100.0  %


 


Tidal Volume  400   400  


 


PEEP  10   5  


 


Potassium     (3.6-5.0)  mmol/L


 


Carbon Dioxide     (21-33)  mmol/L


 


Anion Gap     (10-20)  


 


BUN     (7-21)  mg/dL


 


Creatinine     (0.7-1.2)  mg/dl


 


Est GFR (African Amer)     


 


Est GFR (Non-Af Amer)     


 


POC Glucose (mg/dL)   184 H   ()  mg/dL


 


Random Glucose     ()  mg/dL


 


Calcium     (8.4-10.5)  mg/dL


 


Phosphorus     (2.5-4.5)  mg/dL


 


Magnesium     (1.7-2.2)  mg/dL


 


Total Bilirubin     (0.2-1.3)  mg/dL


 


AST     (14-36)  U/L


 


ALT     (7-56)  U/L


 


Alkaline Phosphatase     ()  U/L


 


Troponin I     ng/mL


 


Total Protein     (5.8-8.3)  g/dL


 


Albumin     (3.0-4.8)  g/dL


 


Globulin     gm/dL


 


Albumin/Globulin Ratio     (1.1-1.8)  


 


Triglycerides     ()  mg/dL


 


Cholesterol     (130-200)  mg/dL


 


LDL Cholesterol Direct     (0-129)  mg/dL


 


HDL Cholesterol     (29-60)  mg/dL


 


Procalcitonin     (0.19-0.49)  NG/ML


 


Arterial Blood Potassium  3.3 L   4.2  (3.6-5.2)  mmol/L


 


Venous Blood Potassium     (3.6-5.2)  mmol/L


 


Urine Color     (YELLOW)  


 


Urine Appearance     (CLEAR)  


 


Urine pH     (4.7-8.0)  


 


Ur Specific Gravity     (1.005-1.035)  


 


Urine Protein     (<30 mg/dL)  mg/dL


 


Urine Glucose (UA)     (NEGATIVE)  mg/dL


 


Urine Ketones     (NEGATIVE)  mg/dL


 


Urine Blood     (NEGATIVE)  


 


Urine Nitrate     (NEGATIVE)  


 


Urine Bilirubin     (NEGATIVE)  


 


Urine Urobilinogen     (<1 E.U./dL)  E.U./dL


 


Ur Leukocyte Esterase     (NEGATIVE)  Alaina/uL


 


Urine RBC     (0-2)  /hpf


 


Urine WBC     (0-6)  /hpf


 


Ur Epithelial Cells     (0-5)  /hpf


 


Amorphous Sediment     


 


Urine Bacteria     (NEG)  


 


Hyaline Casts     /hpf


 


Fine Granular Casts     (0-2)  /hpf


 


Coarse Granular Casts     (0-2)  /hpf


 


Urine Other     


 


Digoxin     (0.8-2.0)  ng/mL














  10/04/18 Range/Units





  11:21 


 


WBC   (4.5-11.0)  10^3/ul


 


RBC   (3.5-6.1)  10^6/uL


 


Hgb   (12.0-16.0)  g/dL


 


Hct   (36.0-48.0)  %


 


MCV   (80.0-105.0)  fl


 


MCH   (25.0-35.0)  pg


 


MCHC   (31.0-37.0)  g/dl


 


RDW   (11.5-14.5)  %


 


Plt Count   (120.0-450.0)  10^3/uL


 


MPV   (7.0-11.0)  fl


 


Gran %   (50.0-68.0)  %


 


Lymph % (Auto)   (22.0-35.0)  %


 


Mono % (Auto)   (1.0-6.0)  %


 


Eos % (Auto)   (1.5-5.0)  %


 


Baso % (Auto)   (0.0-3.0)  %


 


Gran #   (1.4-6.5)  


 


Lymph # (Auto)   (1.2-3.4)  


 


Mono # (Auto)   (0.1-0.6)  


 


Eos # (Auto)   (0.0-0.7)  


 


Baso # (Auto)   (0.0-2.0)  K/mm3


 


Neutrophils % (Manual)   (50.0-70.0)  %


 


Band Neutrophils %   (0-2)  %


 


Lymphocytes % (Manual)   (22.0-35.0)  %


 


Monocytes % (Manual)   (1.0-6.0)  %


 


Platelet Evaluation   (NORMAL)  


 


pCO2   (35-45)  mm/Hg


 


pO2   ()  mm/Hg


 


HCO3   (21-28)  mmol/L


 


ABG pH   (7.35-7.45)  


 


ABG Total CO2   (22-28)  mmol.L


 


ABG O2 Saturation   (95-98)  %


 


ABG O2 Content   (15-23)  ML/dl


 


ABG Base Excess   (-2.0-3.0)  mmol/L


 


ABG Hemoglobin   (11.7-17.4)  g/dL


 


ABG Carboxyhemoglobin   (0.5-1.5)  %


 


POC ABG HHb (Measured)   (0-5)  %


 


ABG Methemoglobin   (0.0-3.0)  %


 


ABG O2 Capacity   (16-24)  mL/dl


 


ABG Potassium   (3.6-5.2)  mmol/L


 


VBG pH   (7.32-7.43)  


 


VBG pCO2   (40-60)  


 


VBG HCO3   (21-28)  mmol/l


 


VBG Total CO2   (22-28)  mmol.L


 


VBG O2 Sat (Calc)   (40-65)  %


 


VBG Base Excess   (0.0-2.0)  mmol/L


 


VBG Potassium   (3.6-5.2)  mmol/L


 


Hgb O2 Saturation   (95.0-98.0)  %


 


Sodium   (132-148)  mmol/L


 


Chloride   ()  mmol/L


 


Glucose   ()  mg/dl


 


Lactate   (0.7-2.1)  mmol/L


 


Mechanical Rate   


 


FiO2   %


 


Tidal Volume   


 


PEEP   


 


Potassium   (3.6-5.0)  mmol/L


 


Carbon Dioxide   (21-33)  mmol/L


 


Anion Gap   (10-20)  


 


BUN   (7-21)  mg/dL


 


Creatinine   (0.7-1.2)  mg/dl


 


Est GFR (African Amer)   


 


Est GFR (Non-Af Amer)   


 


POC Glucose (mg/dL)   ()  mg/dL


 


Random Glucose   ()  mg/dL


 


Calcium   (8.4-10.5)  mg/dL


 


Phosphorus   (2.5-4.5)  mg/dL


 


Magnesium   (1.7-2.2)  mg/dL


 


Total Bilirubin   (0.2-1.3)  mg/dL


 


AST   (14-36)  U/L


 


ALT   (7-56)  U/L


 


Alkaline Phosphatase   ()  U/L


 


Troponin I   ng/mL


 


Total Protein   (5.8-8.3)  g/dL


 


Albumin   (3.0-4.8)  g/dL


 


Globulin   gm/dL


 


Albumin/Globulin Ratio   (1.1-1.8)  


 


Triglycerides   ()  mg/dL


 


Cholesterol   (130-200)  mg/dL


 


LDL Cholesterol Direct   (0-129)  mg/dL


 


HDL Cholesterol   (29-60)  mg/dL


 


Procalcitonin   (0.19-0.49)  NG/ML


 


Arterial Blood Potassium   (3.6-5.2)  mmol/L


 


Venous Blood Potassium   (3.6-5.2)  mmol/L


 


Urine Color  Yellow  (YELLOW)  


 


Urine Appearance  Clear  (CLEAR)  


 


Urine pH  6.0  (4.7-8.0)  


 


Ur Specific Gravity  1.020  (1.005-1.035)  


 


Urine Protein  >=300 H  (<30 mg/dL)  mg/dL


 


Urine Glucose (UA)  100 H  (NEGATIVE)  mg/dL


 


Urine Ketones  Negative  (NEGATIVE)  mg/dL


 


Urine Blood  Moderate H  (NEGATIVE)  


 


Urine Nitrate  Negative  (NEGATIVE)  


 


Urine Bilirubin  Negative  (NEGATIVE)  


 


Urine Urobilinogen  0.2  (<1 E.U./dL)  E.U./dL


 


Ur Leukocyte Esterase  Negative  (NEGATIVE)  Alaina/uL


 


Urine RBC  20 - 25  (0-2)  /hpf


 


Urine WBC  1 - 3  (0-6)  /hpf


 


Ur Epithelial Cells  6 - 8  (0-5)  /hpf


 


Amorphous Sediment  Few  


 


Urine Bacteria  Many  (NEG)  


 


Hyaline Casts  0 - 2  /hpf


 


Fine Granular Casts  0 - 2  (0-2)  /hpf


 


Coarse Granular Casts  Trace H  (0-2)  /hpf


 


Urine Other  Uyeast  


 


Digoxin   (0.8-2.0)  ng/mL








                         Laboratory Results - last 24 hr











  10/04/18 10/04/18 10/04/18





  11:21 12:02 13:33


 


WBC   


 


RBC   


 


Hgb   


 


Hct   


 


MCV   


 


MCH   


 


MCHC   


 


RDW   


 


Plt Count   


 


MPV   


 


Gran %   


 


Lymph % (Auto)   


 


Mono % (Auto)   


 


Eos % (Auto)   


 


Baso % (Auto)   


 


Gran #   


 


Lymph # (Auto)   


 


Mono # (Auto)   


 


Eos # (Auto)   


 


Baso # (Auto)   


 


Neutrophils % (Manual)   


 


Band Neutrophils %   


 


Lymphocytes % (Manual)   


 


Monocytes % (Manual)   


 


Platelet Evaluation   


 


pCO2   53 H 


 


pO2   66.0 L 


 


HCO3   24.9 


 


ABG pH   7.28 L 


 


ABG Total CO2   26.5 


 


ABG O2 Saturation   94.6 L 


 


ABG O2 Content   


 


ABG Base Excess   -2.6 L 


 


ABG Hemoglobin   


 


ABG Carboxyhemoglobin   


 


POC ABG HHb (Measured)   


 


ABG Methemoglobin   


 


ABG O2 Capacity   


 


ABG Potassium   4.2 


 


VBG pH   


 


VBG pCO2   


 


VBG HCO3   


 


VBG Total CO2   


 


VBG O2 Sat (Calc)   


 


VBG Base Excess   


 


VBG Potassium   


 


Hgb O2 Saturation   


 


Sodium   134.0 


 


Chloride   101.0 


 


Glucose   284 H 


 


Lactate   2.2 H 


 


Mechanical Rate   20 


 


FiO2   100.0 


 


Tidal Volume   400 


 


PEEP   5 


 


Potassium   


 


Carbon Dioxide   


 


Anion Gap   


 


BUN   


 


Creatinine   


 


Est GFR ( Amer)   


 


Est GFR (Non-Af Amer)   


 


POC Glucose (mg/dL)    184 H


 


Random Glucose   


 


Calcium   


 


Phosphorus   


 


Magnesium   


 


Total Bilirubin   


 


AST   


 


ALT   


 


Alkaline Phosphatase   


 


Troponin I   


 


Total Protein   


 


Albumin   


 


Globulin   


 


Albumin/Globulin Ratio   


 


Triglycerides   


 


Cholesterol   


 


LDL Cholesterol Direct   


 


HDL Cholesterol   


 


Procalcitonin   


 


Arterial Blood Potassium   4.2 


 


Venous Blood Potassium   


 


Urine Color  Yellow  


 


Urine Appearance  Clear  


 


Urine pH  6.0  


 


Ur Specific Gravity  1.020  


 


Urine Protein  >=300 H  


 


Urine Glucose (UA)  100 H  


 


Urine Ketones  Negative  


 


Urine Blood  Moderate H  


 


Urine Nitrate  Negative  


 


Urine Bilirubin  Negative  


 


Urine Urobilinogen  0.2  


 


Ur Leukocyte Esterase  Negative  


 


Urine RBC  20 - 25  


 


Urine WBC  1 - 3  


 


Ur Epithelial Cells  6 - 8  


 


Amorphous Sediment  Few  


 


Urine Bacteria  Many  


 


Hyaline Casts  0 - 2  


 


Fine Granular Casts  0 - 2  


 


Coarse Granular Casts  Trace H  


 


Urine Other  Uyeast  


 


Digoxin   














  10/04/18 10/04/18 10/04/18





  16:15 16:50 16:50


 


WBC   


 


RBC   


 


Hgb   


 


Hct   


 


MCV   


 


MCH   


 


MCHC   


 


RDW   


 


Plt Count   


 


MPV   


 


Gran %   


 


Lymph % (Auto)   


 


Mono % (Auto)   


 


Eos % (Auto)   


 


Baso % (Auto)   


 


Gran #   


 


Lymph # (Auto)   


 


Mono # (Auto)   


 


Eos # (Auto)   


 


Baso # (Auto)   


 


Neutrophils % (Manual)   


 


Band Neutrophils %   


 


Lymphocytes % (Manual)   


 


Monocytes % (Manual)   


 


Platelet Evaluation   


 


pCO2  42  


 


pO2  62.0 L   52


 


HCO3  26.6  


 


ABG pH  7.41  


 


ABG Total CO2  27.9  


 


ABG O2 Saturation  94.5 L  


 


ABG O2 Content   


 


ABG Base Excess  1.7  


 


ABG Hemoglobin   


 


ABG Carboxyhemoglobin   


 


POC ABG HHb (Measured)   


 


ABG Methemoglobin   


 


ABG O2 Capacity   


 


ABG Potassium  3.3 L  


 


VBG pH    7.39


 


VBG pCO2    47.0


 


VBG HCO3    28.5 H


 


VBG Total CO2    29.9 H


 


VBG O2 Sat (Calc)    89.4 H


 


VBG Base Excess    2.8 H


 


VBG Potassium    3.6


 


Hgb O2 Saturation   


 


Sodium  137.0   138.0


 


Chloride  103.0   101.0


 


Glucose  134 H   136 H


 


Lactate  1.4   1.6


 


Mechanical Rate  30  


 


FiO2  80.0   21.0


 


Tidal Volume  400  


 


PEEP  10  


 


Potassium   


 


Carbon Dioxide   


 


Anion Gap   


 


BUN   


 


Creatinine   


 


Est GFR ( Amer)   


 


Est GFR (Non-Af Amer)   


 


POC Glucose (mg/dL)   


 


Random Glucose   


 


Calcium   


 


Phosphorus   


 


Magnesium   


 


Total Bilirubin   


 


AST   


 


ALT   


 


Alkaline Phosphatase   


 


Troponin I   


 


Total Protein   


 


Albumin   


 


Globulin   


 


Albumin/Globulin Ratio   


 


Triglycerides   


 


Cholesterol   


 


LDL Cholesterol Direct   


 


HDL Cholesterol   


 


Procalcitonin   0.22 


 


Arterial Blood Potassium  3.3 L  


 


Venous Blood Potassium    3.6


 


Urine Color   


 


Urine Appearance   


 


Urine pH   


 


Ur Specific Gravity   


 


Urine Protein   


 


Urine Glucose (UA)   


 


Urine Ketones   


 


Urine Blood   


 


Urine Nitrate   


 


Urine Bilirubin   


 


Urine Urobilinogen   


 


Ur Leukocyte Esterase   


 


Urine RBC   


 


Urine WBC   


 


Ur Epithelial Cells   


 


Amorphous Sediment   


 


Urine Bacteria   


 


Hyaline Casts   


 


Fine Granular Casts   


 


Coarse Granular Casts   


 


Urine Other   


 


Digoxin   














  10/04/18 10/04/18 10/04/18





  16:50 16:50 17:32


 


WBC   


 


RBC   


 


Hgb   


 


Hct   


 


MCV   


 


MCH   


 


MCHC   


 


RDW   


 


Plt Count   


 


MPV   


 


Gran %   


 


Lymph % (Auto)   


 


Mono % (Auto)   


 


Eos % (Auto)   


 


Baso % (Auto)   


 


Gran #   


 


Lymph # (Auto)   


 


Mono # (Auto)   


 


Eos # (Auto)   


 


Baso # (Auto)   


 


Neutrophils % (Manual)   


 


Band Neutrophils %   


 


Lymphocytes % (Manual)   


 


Monocytes % (Manual)   


 


Platelet Evaluation   


 


pCO2   


 


pO2   


 


HCO3   


 


ABG pH   


 


ABG Total CO2   


 


ABG O2 Saturation   


 


ABG O2 Content   


 


ABG Base Excess   


 


ABG Hemoglobin   


 


ABG Carboxyhemoglobin   


 


POC ABG HHb (Measured)   


 


ABG Methemoglobin   


 


ABG O2 Capacity   


 


ABG Potassium   


 


VBG pH   


 


VBG pCO2   


 


VBG HCO3   


 


VBG Total CO2   


 


VBG O2 Sat (Calc)   


 


VBG Base Excess   


 


VBG Potassium   


 


Hgb O2 Saturation   


 


Sodium   138 


 


Chloride   101 


 


Glucose   


 


Lactate   


 


Mechanical Rate   


 


FiO2   


 


Tidal Volume   


 


PEEP   


 


Potassium   3.7 


 


Carbon Dioxide   26 


 


Anion Gap   15 


 


BUN   22 H 


 


Creatinine   0.9 


 


Est GFR ( Amer)   > 60 


 


Est GFR (Non-Af Amer)   > 60 


 


POC Glucose (mg/dL)    130 H


 


Random Glucose   138 H 


 


Calcium   9.0 


 


Phosphorus   3.6 


 


Magnesium   1.9 


 


Total Bilirubin   0.6 


 


AST   38 H 


 


ALT   39 


 


Alkaline Phosphatase   69 


 


Troponin I   0.08  D 


 


Total Protein   7.5 


 


Albumin   4.2 


 


Globulin   3.3 


 


Albumin/Globulin Ratio   1.2 


 


Triglycerides   


 


Cholesterol   


 


LDL Cholesterol Direct   


 


HDL Cholesterol   


 


Procalcitonin   


 


Arterial Blood Potassium   


 


Venous Blood Potassium   


 


Urine Color   


 


Urine Appearance   


 


Urine pH   


 


Ur Specific Gravity   


 


Urine Protein   


 


Urine Glucose (UA)   


 


Urine Ketones   


 


Urine Blood   


 


Urine Nitrate   


 


Urine Bilirubin   


 


Urine Urobilinogen   


 


Ur Leukocyte Esterase   


 


Urine RBC   


 


Urine WBC   


 


Ur Epithelial Cells   


 


Amorphous Sediment   


 


Urine Bacteria   


 


Hyaline Casts   


 


Fine Granular Casts   


 


Coarse Granular Casts   


 


Urine Other   


 


Digoxin  < 0.4 L  














  10/04/18 10/04/18 10/04/18





  21:21 22:40 23:22


 


WBC   15.5 H D 


 


RBC   4.70 


 


Hgb   14.3 


 


Hct   42.5 


 


MCV   90.4  D 


 


MCH   30.4 


 


MCHC   33.6 


 


RDW   14.2 


 


Plt Count   99 L 


 


MPV   12.8 H 


 


Gran %   87.3 H 


 


Lymph % (Auto)   5.6 L 


 


Mono % (Auto)   7.0 H 


 


Eos % (Auto)   0.0 L 


 


Baso % (Auto)   0.1 


 


Gran #   13.50 H 


 


Lymph # (Auto)   0.9 L 


 


Mono # (Auto)   1.1 H 


 


Eos # (Auto)   0.0 


 


Baso # (Auto)   0.01 


 


Neutrophils % (Manual)   


 


Band Neutrophils %   


 


Lymphocytes % (Manual)   


 


Monocytes % (Manual)   


 


Platelet Evaluation   


 


pCO2   


 


pO2   


 


HCO3   


 


ABG pH   


 


ABG Total CO2   


 


ABG O2 Saturation   


 


ABG O2 Content   


 


ABG Base Excess   


 


ABG Hemoglobin   


 


ABG Carboxyhemoglobin   


 


POC ABG HHb (Measured)   


 


ABG Methemoglobin   


 


ABG O2 Capacity   


 


ABG Potassium   


 


VBG pH   


 


VBG pCO2   


 


VBG HCO3   


 


VBG Total CO2   


 


VBG O2 Sat (Calc)   


 


VBG Base Excess   


 


VBG Potassium   


 


Hgb O2 Saturation   


 


Sodium   


 


Chloride   


 


Glucose   


 


Lactate   


 


Mechanical Rate   


 


FiO2   


 


Tidal Volume   


 


PEEP   


 


Potassium   


 


Carbon Dioxide   


 


Anion Gap   


 


BUN   


 


Creatinine   


 


Est GFR ( Amer)   


 


Est GFR (Non-Af Amer)   


 


POC Glucose (mg/dL)  135 H   150 H


 


Random Glucose   


 


Calcium   


 


Phosphorus   


 


Magnesium   


 


Total Bilirubin   


 


AST   


 


ALT   


 


Alkaline Phosphatase   


 


Troponin I   


 


Total Protein   


 


Albumin   


 


Globulin   


 


Albumin/Globulin Ratio   


 


Triglycerides   


 


Cholesterol   


 


LDL Cholesterol Direct   


 


HDL Cholesterol   


 


Procalcitonin   


 


Arterial Blood Potassium   


 


Venous Blood Potassium   


 


Urine Color   


 


Urine Appearance   


 


Urine pH   


 


Ur Specific Gravity   


 


Urine Protein   


 


Urine Glucose (UA)   


 


Urine Ketones   


 


Urine Blood   


 


Urine Nitrate   


 


Urine Bilirubin   


 


Urine Urobilinogen   


 


Ur Leukocyte Esterase   


 


Urine RBC   


 


Urine WBC   


 


Ur Epithelial Cells   


 


Amorphous Sediment   


 


Urine Bacteria   


 


Hyaline Casts   


 


Fine Granular Casts   


 


Coarse Granular Casts   


 


Urine Other   


 


Digoxin   














  10/05/18 10/05/18 10/05/18





  00:05 03:37 05:29


 


WBC   


 


RBC   


 


Hgb   


 


Hct   


 


MCV   


 


MCH   


 


MCHC   


 


RDW   


 


Plt Count   


 


MPV   


 


Gran %   


 


Lymph % (Auto)   


 


Mono % (Auto)   


 


Eos % (Auto)   


 


Baso % (Auto)   


 


Gran #   


 


Lymph # (Auto)   


 


Mono # (Auto)   


 


Eos # (Auto)   


 


Baso # (Auto)   


 


Neutrophils % (Manual)   


 


Band Neutrophils %   


 


Lymphocytes % (Manual)   


 


Monocytes % (Manual)   


 


Platelet Evaluation   


 


pCO2   


 


pO2   


 


HCO3   


 


ABG pH   


 


ABG Total CO2   


 


ABG O2 Saturation   


 


ABG O2 Content   


 


ABG Base Excess   


 


ABG Hemoglobin   


 


ABG Carboxyhemoglobin   


 


POC ABG HHb (Measured)   


 


ABG Methemoglobin   


 


ABG O2 Capacity   


 


ABG Potassium   


 


VBG pH   


 


VBG pCO2   


 


VBG HCO3   


 


VBG Total CO2   


 


VBG O2 Sat (Calc)   


 


VBG Base Excess   


 


VBG Potassium   


 


Hgb O2 Saturation   


 


Sodium  137  


 


Chloride  97 L  


 


Glucose   


 


Lactate   


 


Mechanical Rate   


 


FiO2   


 


Tidal Volume   


 


PEEP   


 


Potassium  3.8  


 


Carbon Dioxide  29  


 


Anion Gap  15  


 


BUN  20  


 


Creatinine  0.8  


 


Est GFR ( Amer)  > 60  


 


Est GFR (Non-Af Amer)  > 60  


 


POC Glucose (mg/dL)   132 H  161 H


 


Random Glucose  134 H  


 


Calcium  8.8  


 


Phosphorus  4.6 H  


 


Magnesium  1.8  


 


Total Bilirubin   


 


AST   


 


ALT   


 


Alkaline Phosphatase   


 


Troponin I   


 


Total Protein   


 


Albumin   


 


Globulin   


 


Albumin/Globulin Ratio   


 


Triglycerides   


 


Cholesterol   


 


LDL Cholesterol Direct   


 


HDL Cholesterol   


 


Procalcitonin   


 


Arterial Blood Potassium   


 


Venous Blood Potassium   


 


Urine Color   


 


Urine Appearance   


 


Urine pH   


 


Ur Specific Gravity   


 


Urine Protein   


 


Urine Glucose (UA)   


 


Urine Ketones   


 


Urine Blood   


 


Urine Nitrate   


 


Urine Bilirubin   


 


Urine Urobilinogen   


 


Ur Leukocyte Esterase   


 


Urine RBC   


 


Urine WBC   


 


Ur Epithelial Cells   


 


Amorphous Sediment   


 


Urine Bacteria   


 


Hyaline Casts   


 


Fine Granular Casts   


 


Coarse Granular Casts   


 


Urine Other   


 


Digoxin   














  10/05/18 10/05/18 10/05/18





  05:30 05:30 06:00


 


WBC  17.9 H  


 


RBC  4.90  


 


Hgb  15.1  


 


Hct  44.7  


 


MCV  91.2  


 


MCH  30.8  


 


MCHC  33.8  


 


RDW  14.3  


 


Plt Count  180  


 


MPV  12.5 H  


 


Gran %  84.5 H  


 


Lymph % (Auto)  4.2 L  


 


Mono % (Auto)  11.2 H  


 


Eos % (Auto)  0.0 L  


 


Baso % (Auto)  0.1  


 


Gran #  15.17 H  


 


Lymph # (Auto)  0.8 L  


 


Mono # (Auto)  2.0 H  


 


Eos # (Auto)  0.0  


 


Baso # (Auto)  0.01  


 


Neutrophils % (Manual)  89 H  


 


Band Neutrophils %  3 H  


 


Lymphocytes % (Manual)  7 L  


 


Monocytes % (Manual)  1  


 


Platelet Evaluation  Normal  


 


pCO2    29 L


 


pO2    151.0 H


 


HCO3    22.6


 


ABG pH    7.50 H


 


ABG Total CO2    23.5


 


ABG O2 Saturation    99.7 H


 


ABG O2 Content    21.8


 


ABG Base Excess    0.6


 


ABG Hemoglobin    15.8


 


ABG Carboxyhemoglobin    1.5


 


POC ABG HHb (Measured)    0.3


 


ABG Methemoglobin    1.1


 


ABG O2 Capacity    21.9


 


ABG Potassium   


 


VBG pH   


 


VBG pCO2   


 


VBG HCO3   


 


VBG Total CO2   


 


VBG O2 Sat (Calc)   


 


VBG Base Excess   


 


VBG Potassium   


 


Hgb O2 Saturation    97.1


 


Sodium   133 


 


Chloride   97 L 


 


Glucose   


 


Lactate   


 


Mechanical Rate   


 


FiO2    80.0


 


Tidal Volume   


 


PEEP   


 


Potassium   3.8 


 


Carbon Dioxide   26 


 


Anion Gap   15 


 


BUN   18 


 


Creatinine   0.7 


 


Est GFR ( Amer)   > 60 


 


Est GFR (Non-Af Amer)   > 60 


 


POC Glucose (mg/dL)   


 


Random Glucose   134 H 


 


Calcium   8.2 L 


 


Phosphorus   3.6 


 


Magnesium   1.7 


 


Total Bilirubin   1.3 


 


AST   60 H D 


 


ALT   28 


 


Alkaline Phosphatase   62 


 


Troponin I   


 


Total Protein   7.3 


 


Albumin   3.7 


 


Globulin   3.5 


 


Albumin/Globulin Ratio   1.1 


 


Triglycerides   1994 H 


 


Cholesterol   160 


 


LDL Cholesterol Direct   71 


 


HDL Cholesterol   35 


 


Procalcitonin   


 


Arterial Blood Potassium   


 


Venous Blood Potassium   


 


Urine Color   


 


Urine Appearance   


 


Urine pH   


 


Ur Specific Gravity   


 


Urine Protein   


 


Urine Glucose (UA)   


 


Urine Ketones   


 


Urine Blood   


 


Urine Nitrate   


 


Urine Bilirubin   


 


Urine Urobilinogen   


 


Ur Leukocyte Esterase   


 


Urine RBC   


 


Urine WBC   


 


Ur Epithelial Cells   


 


Amorphous Sediment   


 


Urine Bacteria   


 


Hyaline Casts   


 


Fine Granular Casts   


 


Coarse Granular Casts   


 


Urine Other   


 


Digoxin   














  10/05/18





  07:33


 


WBC 


 


RBC 


 


Hgb 


 


Hct 


 


MCV 


 


MCH 


 


MCHC 


 


RDW 


 


Plt Count 


 


MPV 


 


Gran % 


 


Lymph % (Auto) 


 


Mono % (Auto) 


 


Eos % (Auto) 


 


Baso % (Auto) 


 


Gran # 


 


Lymph # (Auto) 


 


Mono # (Auto) 


 


Eos # (Auto) 


 


Baso # (Auto) 


 


Neutrophils % (Manual) 


 


Band Neutrophils % 


 


Lymphocytes % (Manual) 


 


Monocytes % (Manual) 


 


Platelet Evaluation 


 


pCO2 


 


pO2 


 


HCO3 


 


ABG pH 


 


ABG Total CO2 


 


ABG O2 Saturation 


 


ABG O2 Content 


 


ABG Base Excess 


 


ABG Hemoglobin 


 


ABG Carboxyhemoglobin 


 


POC ABG HHb (Measured) 


 


ABG Methemoglobin 


 


ABG O2 Capacity 


 


ABG Potassium 


 


VBG pH 


 


VBG pCO2 


 


VBG HCO3 


 


VBG Total CO2 


 


VBG O2 Sat (Calc) 


 


VBG Base Excess 


 


VBG Potassium 


 


Hgb O2 Saturation 


 


Sodium 


 


Chloride 


 


Glucose 


 


Lactate 


 


Mechanical Rate 


 


FiO2 


 


Tidal Volume 


 


PEEP 


 


Potassium 


 


Carbon Dioxide 


 


Anion Gap 


 


BUN 


 


Creatinine 


 


Est GFR ( Amer) 


 


Est GFR (Non-Af Amer) 


 


POC Glucose (mg/dL)  195 H


 


Random Glucose 


 


Calcium 


 


Phosphorus 


 


Magnesium 


 


Total Bilirubin 


 


AST 


 


ALT 


 


Alkaline Phosphatase 


 


Troponin I 


 


Total Protein 


 


Albumin 


 


Globulin 


 


Albumin/Globulin Ratio 


 


Triglycerides 


 


Cholesterol 


 


LDL Cholesterol Direct 


 


HDL Cholesterol 


 


Procalcitonin 


 


Arterial Blood Potassium 


 


Venous Blood Potassium 


 


Urine Color 


 


Urine Appearance 


 


Urine pH 


 


Ur Specific Gravity 


 


Urine Protein 


 


Urine Glucose (UA) 


 


Urine Ketones 


 


Urine Blood 


 


Urine Nitrate 


 


Urine Bilirubin 


 


Urine Urobilinogen 


 


Ur Leukocyte Esterase 


 


Urine RBC 


 


Urine WBC 


 


Ur Epithelial Cells 


 


Amorphous Sediment 


 


Urine Bacteria 


 


Hyaline Casts 


 


Fine Granular Casts 


 


Coarse Granular Casts 


 


Urine Other 


 


Digoxin 











EKG/Cardiology Studies: 


Cardiology / EKG Studies





10/04/18 10:56


EKG [ELECTROCARDIOGRAM] Stat 


   Comment: 


   Reason For Exam: CARDIAC ARREST





10/04/18 14:31


EKG [ELECTROCARDIOGRAM] Stat 


   Comment: 


   Reason For Exam: CARDAC ARREST





10/04/18 23:49


EKG [ELECTROCARDIOGRAM] Stat 


   Comment: 


   Reason For Exam: code freeze


   Does Patient Have a Pacemaker?: No


   PERFORMING PHYSICIAN/PROVIDER:: Booker Peck











Fingerstick Blood Sugar Results: 195





Review of Systems





- Review of Systems


Systems not reviewed;Unavailable: Intubated





Critical Care Progress Note





- Ventilator Checklist


Head of Bed 30 Degrees: Yes


Daily Sedation Vacation: Yes


Daily Assessment of Readiness to Wean: Yes


Daily Spontaneous Breathing Trial: Yes


PUD Prophalyxis: Yes


DVT Prophylaxis: Yes





- Vent Settings


MODE:: PRVC


TIDAL VOLUME:: 400


RESP RATE:: 15


FIO2:: 50


PEEP:: 15





- Extremities/Vascular


Does the Patient have a Central Venous Catheter?: No





Assessment/Plan





- Assessment and Plan (Free Text)


Assessment: 





57 yo F with PMH of DM2, HTN, COPD, and chronic AFib presented in [facility] via [facility] 

with ROSC achieved after 2 minutes of CPR and one dose of epinephrine. Patient 

now with no activity on EEG, non-responsive, and reflexes absent.


Plan: 





Neuro:


-Patient is currently non-responsive to sternal rub, trapezius pinch


-Doll's eyes, gag, corneal, and pupillary reflexes all absent


-Likely has significant anoxic brain injury


-Patient is currently on cooling protocol


-Per neurology, no activity thus far on EEG


-Continue cooling protocol, maintain temp < 95


-F/u neurology recs





Cardio:


-Currently RRR, normotensive


-PCI shortly after arrival in ED showed no significant stenosis, no stents were 

placed


-LVEF found to be between 15-20% on cath


-On continuous amiodarone and dobutamine


-Patient has hx of AFib


-Cardiac arrest likely 2/2 arrhythmia


-F/u cardiology recs





Pulm:


-Intubated and sedated with fentanyl, versed


-CTA b/l 


-D/c propofol due to severe hypertriglyceridemia


-Vent Settings:  RR 15 PEEP 15 O2 50


-Protective lung ventilation strategy


-Keep head of bed elevated to 30 degrees


-Aspiration pxns


-Daily ABG, CXR





GI:


-NPO on vent


-NG in place, no suction needed, may use for meds


-Consider tube feedings if needed





/Nephro:


-BUN/Cr stable


-UOP adequate


-Continue to monitor


-Maintain euvolemia





Endocrine:


-Maintain euglycemia


-Patient has hx of DM2


-ISS as needed





ID:


-Afebrile


-Leukocytosis likely reactionary


-F/u blood, urine cx, urine legionella results


-On empiric cefepime and flagyl 


-ID following, recs appreciated





Heme/Onc:


-H/H stable at 15.1/44.7


-No signs of HD compromise 


-Continue monitoring H/H 





DVT/GI PPX: Protonix and lovenox


Full Code


Monitor in MICU





Case and plan reviewed and discussed with my attending Dr. Benjamin Neely, DO


IM Resident PGY-1








<Christofer Alexander - Last Filed: 10/05/18 13:18>





CCU Objective





- Vital Signs / Intake & Output


Vital Signs (Last 4 hours): 


Vital Signs











  Temp Pulse BP Pulse Ox


 


 10/05/18 12:30    125/82 


 


 10/05/18 12:29  95.2 F L  73   97


 


 10/05/18 12:20  95.2 F L  75   97


 


 10/05/18 12:15    128/74 


 


 10/05/18 12:14  95.0 F L  73   97


 


 10/05/18 12:10  95.0 F L  74   97


 


 10/05/18 12:00    132/90 


 


 10/05/18 11:59  94.8 F L    97


 


 10/05/18 11:50  94.5 F L    96


 


 10/05/18 11:45  94.5 F L   139/94 H  98


 


 10/05/18 11:40  94.3 F L  77   100


 


 10/05/18 11:37  94.1 F L    91 L


 


 10/05/18 11:30  93.9 F L  73  130/82  100


 


 10/05/18 11:26  93.7 F L  89  119/89  100


 


 10/05/18 11:24  93.7 F L  90  


 


 10/05/18 11:20  93.6 F L  94 H   99


 


 10/05/18 11:16  93.4 F L  85  159/92 H  98


 


 10/05/18 11:10  92.8 F L  72   90 L


 


 10/05/18 11:00  92.1 F L  83  116/85  91 L


 


 10/05/18 10:59    95/57 L 


 


 10/05/18 10:56  91.8 F L  69  95/57 L  94 L


 


 10/05/18 10:50  91.4 F L  65   95


 


 10/05/18 10:48  91.2 F L  70  90/54 L  95


 


 10/05/18 10:45  91.0 F L  67  86/54 L  94 L


 


 10/05/18 10:40  90.7 F L  66   95


 


 10/05/18 10:37  90.5 F L  65  88/49 L  95


 


 10/05/18 10:30  90.0 F L  72  89/41 L  94 L


 


 10/05/18 10:20  89.4 F L  61   96


 


 10/05/18 10:15  89.1 F L  62  97/47 L  95


 


 10/05/18 10:10  88.7 F L  62   96


 


 10/05/18 10:00  88.3 F L  63  98/34 L  96


 


 10/05/18 09:50  87.8 F L  57 L   96


 


 10/05/18 09:45  87.6 F L  60  103/56 L  96


 


 10/05/18 09:40  87.3 F L   102/71  95


 


 10/05/18 09:30  86.9 F L  57 L  91/65 L  93 L


 


 10/05/18 09:20  86.7 F L    93 L


 


 10/05/18 09:15  86.5 F L  54 L  97/68 L  94 L











Intake and Output (Last 8hrs): 


                                 Intake & Output











 10/04/18 10/05/18 10/05/18





 22:59 06:59 14:59


 


Intake Total 100 475 226


 


Output Total 80 2020 


 


Balance 20 -1545 226


 


Intake:   


 


   475 226


 


    Right Hand  400 


 


  Oral  0 


 


Output:   


 


  Urine 80 1900 


 


    2-way Urethral 80 1900 


 


  Stool  120 


 


Other:   


 


  # Bowel Movements  0 














- Medications


Active Medications: 


Active Medications











Generic Name Dose Route Start Last Admin





  Trade Name Freq  PRN Reason Stop Dose Admin


 


Amiodarone HCl  400 mg  10/05/18 10:00  10/05/18 09:13





  Cordarone  PO  10/07/18 23:59  Not Given





  TID [facility]   





     





     





     





     


 


Amiodarone HCl  200 mg  10/08/18 10:00  





  Cordarone  PO   





  DAILY [facility]   





     





     





     





     


 


Aspirin  81 mg  10/05/18 06:00  10/05/18 10:56





  Aspirin Chewable  NG   81 mg





  DAILY JIM   Administration





     





     





     





     


 


Atorvastatin Calcium  80 mg  10/04/18 17:00  





  Lipitor  NG   





  DIN [facility]   





     





     





     





     


 


Gemfibrozil  600 mg  10/05/18 10:15  10/05/18 10:59





  Lopid  PO   600 mg





  BID JIM   Administration





     





     





     





     


 


Heparin Sodium (Porcine)  5,000 units  10/04/18 20:00  10/05/18 05:44





  Heparin  SC   5,000 units





  Q8 JIM   Administration





     





     





  Protocol   





     


 


Dobutamine HCl/Dextrose  500 mg in 250 mls @ 7.62 mls/hr  10/04/18 11:18  

10/05/18 07:50





  Dobutamine/Dextrose 5% 500mg/250ml  IV   4 mcg/kg/min





  .Q24H PRN   15.241 mls/hr





  TITRATE PER PROTOCOL   Titration





     





  Protocol   





  2 MCG/KG/MIN   


 


Heparin Sodium/Sodium Chloride  25,000 units in 250 mls @ 15.241 mls/hr  

10/04/18 11:30  10/04/18 11:30





  Heparin 39697 Units/250ml 1/2 Normal Saline  IV   Not Given





  [id number] [facility]   





     





     





  Protocol   





  12 UNITS/KG/HR   


 


Levetiracetam  500 mg in 100 mls @ 400 mls/hr  10/04/18 11:30  10/05/18 09:12





  Keppra 500mg Ivpb  IVPB   400 mls/hr





  Q12 JIM   Administration





     





     





     





     


 


Cisatracurium Besylate 200 mg/  270 mls @ 4.66 mls/hr  10/04/18 16:05  10/05/18 

07:00





  Sodium Chloride  IV   0.5 mcg/kg/min





  .Q24H PRN   4.66 mls/hr





  TITRATE PER MD ORDER   Titration





     





  Protocol   





  0.5 MCG/KG/MIN   


 


Acetaminophen  1,000 mg in 100 mls @ 400 mls/hr  10/04/18 16:16  





  Ofirmev  IVPB  10/06/18 16:17  





  Q6H PRN   





  for T>100F   





     





     





     


 


Doxycycline Hyclate 100 mg/  100 mls @ 100 mls/hr  10/04/18 22:00  10/05/18 

10:45





  Sodium Chloride  IVPB   100 mls/hr





  Q12 JIM   Administration





     





     





  Protocol   





     


 


Metronidazole  500 mg in 100 mls @ 100 mls/hr  10/04/18 22:00  10/05/18 05:45





  Flagyl  IVPB   100 mls/hr





  Q8 JIM   Administration





     





     





  Protocol   





     


 


Amiodarone HCl/Dextrose  360 mg in 200 mls @ 16.667 mls/hr  10/04/18 19:45  

10/04/18 21:53





  Nexterone 360 Mg In D5w 200 Ml (Premix)  IV   16.667 mls/hr





  .Q12H JIM   Administration





     





     





  Protocol   





  0.5 MG/MIN   


 


Cefepime HCl  2 gm in 100 mls @ 100 mls/hr  10/05/18 08:00  10/05/18 09:00





  Maxipime 2gm  IVPB  10/10/18 08:01  100 mls/hr





  Q8 JIM   Administration





     





     





  Protocol   





     


 


Fentanyl Citrate  1,000 mcg in 100 mls @ 10 mls/hr  10/05/18 10:05  10/05/18 

13:07





  Fentanyl Citrate/Sodium Chloride 1 Mg/100 Ml  IV   40 mcg/hr





  .Q10H PRN   4 mls/hr





  TITRATE PER MD ORDER   Titration





     





  Protocol   





  100 MCG/HR   


 


Midazolam 100 mg/100ml in NS  100 mg in 100 mls @ 1 mls/hr  10/05/18 10:06  

10/05/18 11:30





  Midazolam 100 Mg/100ml In Ns  IV   2 mg/hr





  .Q24H PRN   2 mls/hr





  Sedation   Titration





     





  Protocol   





  1 MG/HR   


 


Insulin Human Regular  0 units  10/04/18 13:00  10/05/18 11:56





  Humulin R Med  SC   3 unit





  Q2H JIM   Administration





     





     





  Protocol   





     


 


Pantoprazole Sodium  40 mg  10/04/18 11:30  10/05/18 09:12





  Protonix Inj  IVP   40 mg





  DAILY JIM   Administration





     





     





     





     














- Patient Studies


Lab Studies: 


                              Microbiology Studies











 10/04/18 12:45 Blood Culture - Preliminary





 Blood-Venous    NO GROWTH AFTER 24 HOURS


 


 10/04/18 12:33 Blood Culture - Preliminary





 Blood-Venous    NO GROWTH AFTER 24 HOURS








                                   Lab Studies











  10/05/18 10/05/18 10/05/18 Range/Units





  11:41 10:45 07:33 


 


WBC     (4.5-11.0)  10^3/ul


 


RBC     (3.5-6.1)  10^6/uL


 


Hgb     (12.0-16.0)  g/dL


 


Hct     (36.0-48.0)  %


 


MCV     (80.0-105.0)  fl


 


MCH     (25.0-35.0)  pg


 


MCHC     (31.0-37.0)  g/dl


 


RDW     (11.5-14.5)  %


 


Plt Count     (120.0-450.0)  10^3/uL


 


MPV     (7.0-11.0)  fl


 


Gran %     (50.0-68.0)  %


 


Lymph % (Auto)     (22.0-35.0)  %


 


Mono % (Auto)     (1.0-6.0)  %


 


Eos % (Auto)     (1.5-5.0)  %


 


Baso % (Auto)     (0.0-3.0)  %


 


Gran #     (1.4-6.5)  


 


Lymph # (Auto)     (1.2-3.4)  


 


Mono # (Auto)     (0.1-0.6)  


 


Eos # (Auto)     (0.0-0.7)  


 


Baso # (Auto)     (0.0-2.0)  K/mm3


 


Neutrophils % (Manual)     (50.0-70.0)  %


 


Band Neutrophils %     (0-2)  %


 


Lymphocytes % (Manual)     (22.0-35.0)  %


 


Monocytes % (Manual)     (1.0-6.0)  %


 


Platelet Evaluation     (NORMAL)  


 


pCO2     (35-45)  mm/Hg


 


pO2     ()  mm/Hg


 


HCO3     (21-28)  mmol/L


 


ABG pH     (7.35-7.45)  


 


ABG Total CO2     (22-28)  mmol.L


 


ABG O2 Saturation     (95-98)  %


 


ABG O2 Content     (15-23)  ML/dl


 


ABG Base Excess     (-2.0-3.0)  mmol/L


 


ABG Hemoglobin     (11.7-17.4)  g/dL


 


ABG Carboxyhemoglobin     (0.5-1.5)  %


 


POC ABG HHb (Measured)     (0-5)  %


 


ABG Methemoglobin     (0.0-3.0)  %


 


ABG O2 Capacity     (16-24)  mL/dl


 


ABG Potassium     (3.6-5.2)  mmol/L


 


VBG pH     (7.32-7.43)  


 


VBG pCO2     (40-60)  


 


VBG HCO3     (21-28)  mmol/l


 


VBG Total CO2     (22-28)  mmol.L


 


VBG O2 Sat (Calc)     (40-65)  %


 


VBG Base Excess     (0.0-2.0)  mmol/L


 


VBG Potassium     (3.6-5.2)  mmol/L


 


Hgb O2 Saturation     (95.0-98.0)  %


 


Sodium     (132-148)  mmol/L


 


Chloride     ()  mmol/L


 


Glucose     ()  mg/dl


 


Lactate     (0.7-2.1)  mmol/L


 


Mechanical Rate     


 


FiO2     %


 


Tidal Volume     


 


PEEP     


 


Potassium     (3.6-5.0)  mmol/L


 


Carbon Dioxide     (21-33)  mmol/L


 


Anion Gap     (10-20)  


 


BUN     (7-21)  mg/dL


 


Creatinine     (0.7-1.2)  mg/dl


 


Est GFR (African Amer)     


 


Est GFR (Non-Af Amer)     


 


POC Glucose (mg/dL)  214 H   195 H  ()  mg/dL


 


Random Glucose     ()  mg/dL


 


Hemoglobin A1c     (4.2-6.5)  %


 


Calcium     (8.4-10.5)  mg/dL


 


Phosphorus     (2.5-4.5)  mg/dL


 


Magnesium     (1.7-2.2)  mg/dL


 


Total Bilirubin     (0.2-1.3)  mg/dL


 


AST     (14-36)  U/L


 


ALT     (7-56)  U/L


 


Alkaline Phosphatase     ()  U/L


 


Troponin I     ng/mL


 


Total Protein     (5.8-8.3)  g/dL


 


Albumin     (3.0-4.8)  g/dL


 


Globulin     gm/dL


 


Albumin/Globulin Ratio     (1.1-1.8)  


 


Triglycerides     ()  mg/dL


 


Cholesterol     (130-200)  mg/dL


 


LDL Cholesterol Direct     (0-129)  mg/dL


 


HDL Cholesterol     (29-60)  mg/dL


 


Lipase   89   ()  U/L


 


Procalcitonin     (0.19-0.49)  NG/ML


 


Arterial Blood Potassium     (3.6-5.2)  mmol/L


 


Venous Blood Potassium     (3.6-5.2)  mmol/L


 


Digoxin     (0.8-2.0)  ng/mL














  10/05/18 10/05/18 10/05/18 Range/Units





  06:00 05:30 05:30 


 


WBC     (4.5-11.0)  10^3/ul


 


RBC     (3.5-6.1)  10^6/uL


 


Hgb     (12.0-16.0)  g/dL


 


Hct     (36.0-48.0)  %


 


MCV     (80.0-105.0)  fl


 


MCH     (25.0-35.0)  pg


 


MCHC     (31.0-37.0)  g/dl


 


RDW     (11.5-14.5)  %


 


Plt Count     (120.0-450.0)  10^3/uL


 


MPV     (7.0-11.0)  fl


 


Gran %     (50.0-68.0)  %


 


Lymph % (Auto)     (22.0-35.0)  %


 


Mono % (Auto)     (1.0-6.0)  %


 


Eos % (Auto)     (1.5-5.0)  %


 


Baso % (Auto)     (0.0-3.0)  %


 


Gran #     (1.4-6.5)  


 


Lymph # (Auto)     (1.2-3.4)  


 


Mono # (Auto)     (0.1-0.6)  


 


Eos # (Auto)     (0.0-0.7)  


 


Baso # (Auto)     (0.0-2.0)  K/mm3


 


Neutrophils % (Manual)     (50.0-70.0)  %


 


Band Neutrophils %     (0-2)  %


 


Lymphocytes % (Manual)     (22.0-35.0)  %


 


Monocytes % (Manual)     (1.0-6.0)  %


 


Platelet Evaluation     (NORMAL)  


 


pCO2  29 L    (35-45)  mm/Hg


 


pO2  151.0 H    ()  mm/Hg


 


HCO3  22.6    (21-28)  mmol/L


 


ABG pH  7.50 H    (7.35-7.45)  


 


ABG Total CO2  23.5    (22-28)  mmol.L


 


ABG O2 Saturation  99.7 H    (95-98)  %


 


ABG O2 Content  21.8    (15-23)  ML/dl


 


ABG Base Excess  0.6    (-2.0-3.0)  mmol/L


 


ABG Hemoglobin  15.8    (11.7-17.4)  g/dL


 


ABG Carboxyhemoglobin  1.5    (0.5-1.5)  %


 


POC ABG HHb (Measured)  0.3    (0-5)  %


 


ABG Methemoglobin  1.1    (0.0-3.0)  %


 


ABG O2 Capacity  21.9    (16-24)  mL/dl


 


ABG Potassium     (3.6-5.2)  mmol/L


 


VBG pH     (7.32-7.43)  


 


VBG pCO2     (40-60)  


 


VBG HCO3     (21-28)  mmol/l


 


VBG Total CO2     (22-28)  mmol.L


 


VBG O2 Sat (Calc)     (40-65)  %


 


VBG Base Excess     (0.0-2.0)  mmol/L


 


VBG Potassium     (3.6-5.2)  mmol/L


 


Hgb O2 Saturation  97.1    (95.0-98.0)  %


 


Sodium    133  (132-148)  mmol/L


 


Chloride    97 L  ()  mmol/L


 


Glucose     ()  mg/dl


 


Lactate     (0.7-2.1)  mmol/L


 


Mechanical Rate     


 


FiO2  80.0    %


 


Tidal Volume     


 


PEEP     


 


Potassium    3.8  (3.6-5.0)  mmol/L


 


Carbon Dioxide    26  (21-33)  mmol/L


 


Anion Gap    15  (10-20)  


 


BUN    18  (7-21)  mg/dL


 


Creatinine    0.7  (0.7-1.2)  mg/dl


 


Est GFR (African Amer)    > 60  


 


Est GFR (Non-Af Amer)    > 60  


 


POC Glucose (mg/dL)     ()  mg/dL


 


Random Glucose    134 H  ()  mg/dL


 


Hemoglobin A1c   6.5   (4.2-6.5)  %


 


Calcium    8.2 L  (8.4-10.5)  mg/dL


 


Phosphorus    3.6  (2.5-4.5)  mg/dL


 


Magnesium    1.7  (1.7-2.2)  mg/dL


 


Total Bilirubin    1.3  (0.2-1.3)  mg/dL


 


AST    60 H D  (14-36)  U/L


 


ALT    28  (7-56)  U/L


 


Alkaline Phosphatase    62  ()  U/L


 


Troponin I     ng/mL


 


Total Protein    7.3  (5.8-8.3)  g/dL


 


Albumin    3.7  (3.0-4.8)  g/dL


 


Globulin    3.5  gm/dL


 


Albumin/Globulin Ratio    1.1  (1.1-1.8)  


 


Triglycerides    1994 H  ()  mg/dL


 


Cholesterol    160  (130-200)  mg/dL


 


LDL Cholesterol Direct    71  (0-129)  mg/dL


 


HDL Cholesterol    35  (29-60)  mg/dL


 


Lipase     ()  U/L


 


Procalcitonin     (0.19-0.49)  NG/ML


 


Arterial Blood Potassium     (3.6-5.2)  mmol/L


 


Venous Blood Potassium     (3.6-5.2)  mmol/L


 


Digoxin     (0.8-2.0)  ng/mL














  10/05/18 10/05/18 10/05/18 Range/Units





  05:30 05:29 03:37 


 


WBC  17.9 H    (4.5-11.0)  10^3/ul


 


RBC  4.90    (3.5-6.1)  10^6/uL


 


Hgb  15.1    (12.0-16.0)  g/dL


 


Hct  44.7    (36.0-48.0)  %


 


MCV  91.2    (80.0-105.0)  fl


 


MCH  30.8    (25.0-35.0)  pg


 


MCHC  33.8    (31.0-37.0)  g/dl


 


RDW  14.3    (11.5-14.5)  %


 


Plt Count  180    (120.0-450.0)  10^3/uL


 


MPV  12.5 H    (7.0-11.0)  fl


 


Gran %  84.5 H    (50.0-68.0)  %


 


Lymph % (Auto)  4.2 L    (22.0-35.0)  %


 


Mono % (Auto)  11.2 H    (1.0-6.0)  %


 


Eos % (Auto)  0.0 L    (1.5-5.0)  %


 


Baso % (Auto)  0.1    (0.0-3.0)  %


 


Gran #  15.17 H    (1.4-6.5)  


 


Lymph # (Auto)  0.8 L    (1.2-3.4)  


 


Mono # (Auto)  2.0 H    (0.1-0.6)  


 


Eos # (Auto)  0.0    (0.0-0.7)  


 


Baso # (Auto)  0.01    (0.0-2.0)  K/mm3


 


Neutrophils % (Manual)  89 H    (50.0-70.0)  %


 


Band Neutrophils %  3 H    (0-2)  %


 


Lymphocytes % (Manual)  7 L    (22.0-35.0)  %


 


Monocytes % (Manual)  1    (1.0-6.0)  %


 


Platelet Evaluation  Normal    (NORMAL)  


 


pCO2     (35-45)  mm/Hg


 


pO2     ()  mm/Hg


 


HCO3     (21-28)  mmol/L


 


ABG pH     (7.35-7.45)  


 


ABG Total CO2     (22-28)  mmol.L


 


ABG O2 Saturation     (95-98)  %


 


ABG O2 Content     (15-23)  ML/dl


 


ABG Base Excess     (-2.0-3.0)  mmol/L


 


ABG Hemoglobin     (11.7-17.4)  g/dL


 


ABG Carboxyhemoglobin     (0.5-1.5)  %


 


POC ABG HHb (Measured)     (0-5)  %


 


ABG Methemoglobin     (0.0-3.0)  %


 


ABG O2 Capacity     (16-24)  mL/dl


 


ABG Potassium     (3.6-5.2)  mmol/L


 


VBG pH     (7.32-7.43)  


 


VBG pCO2     (40-60)  


 


VBG HCO3     (21-28)  mmol/l


 


VBG Total CO2     (22-28)  mmol.L


 


VBG O2 Sat (Calc)     (40-65)  %


 


VBG Base Excess     (0.0-2.0)  mmol/L


 


VBG Potassium     (3.6-5.2)  mmol/L


 


Hgb O2 Saturation     (95.0-98.0)  %


 


Sodium     (132-148)  mmol/L


 


Chloride     ()  mmol/L


 


Glucose     ()  mg/dl


 


Lactate     (0.7-2.1)  mmol/L


 


Mechanical Rate     


 


FiO2     %


 


Tidal Volume     


 


PEEP     


 


Potassium     (3.6-5.0)  mmol/L


 


Carbon Dioxide     (21-33)  mmol/L


 


Anion Gap     (10-20)  


 


BUN     (7-21)  mg/dL


 


Creatinine     (0.7-1.2)  mg/dl


 


Est GFR (African Amer)     


 


Est GFR (Non-Af Amer)     


 


POC Glucose (mg/dL)   161 H  132 H  ()  mg/dL


 


Random Glucose     ()  mg/dL


 


Hemoglobin A1c     (4.2-6.5)  %


 


Calcium     (8.4-10.5)  mg/dL


 


Phosphorus     (2.5-4.5)  mg/dL


 


Magnesium     (1.7-2.2)  mg/dL


 


Total Bilirubin     (0.2-1.3)  mg/dL


 


AST     (14-36)  U/L


 


ALT     (7-56)  U/L


 


Alkaline Phosphatase     ()  U/L


 


Troponin I     ng/mL


 


Total Protein     (5.8-8.3)  g/dL


 


Albumin     (3.0-4.8)  g/dL


 


Globulin     gm/dL


 


Albumin/Globulin Ratio     (1.1-1.8)  


 


Triglycerides     ()  mg/dL


 


Cholesterol     (130-200)  mg/dL


 


LDL Cholesterol Direct     (0-129)  mg/dL


 


HDL Cholesterol     (29-60)  mg/dL


 


Lipase     ()  U/L


 


Procalcitonin     (0.19-0.49)  NG/ML


 


Arterial Blood Potassium     (3.6-5.2)  mmol/L


 


Venous Blood Potassium     (3.6-5.2)  mmol/L


 


Digoxin     (0.8-2.0)  ng/mL














  10/05/18 10/04/18 10/04/18 Range/Units





  00:05 23:22 22:40 


 


WBC    15.5 H D  (4.5-11.0)  10^3/ul


 


RBC    4.70  (3.5-6.1)  10^6/uL


 


Hgb    14.3  (12.0-16.0)  g/dL


 


Hct    42.5  (36.0-48.0)  %


 


MCV    90.4  D  (80.0-105.0)  fl


 


MCH    30.4  (25.0-35.0)  pg


 


MCHC    33.6  (31.0-37.0)  g/dl


 


RDW    14.2  (11.5-14.5)  %


 


Plt Count    99 L  (120.0-450.0)  10^3/uL


 


MPV    12.8 H  (7.0-11.0)  fl


 


Gran %    87.3 H  (50.0-68.0)  %


 


Lymph % (Auto)    5.6 L  (22.0-35.0)  %


 


Mono % (Auto)    7.0 H  (1.0-6.0)  %


 


Eos % (Auto)    0.0 L  (1.5-5.0)  %


 


Baso % (Auto)    0.1  (0.0-3.0)  %


 


Gran #    13.50 H  (1.4-6.5)  


 


Lymph # (Auto)    0.9 L  (1.2-3.4)  


 


Mono # (Auto)    1.1 H  (0.1-0.6)  


 


Eos # (Auto)    0.0  (0.0-0.7)  


 


Baso # (Auto)    0.01  (0.0-2.0)  K/mm3


 


Neutrophils % (Manual)     (50.0-70.0)  %


 


Band Neutrophils %     (0-2)  %


 


Lymphocytes % (Manual)     (22.0-35.0)  %


 


Monocytes % (Manual)     (1.0-6.0)  %


 


Platelet Evaluation     (NORMAL)  


 


pCO2     (35-45)  mm/Hg


 


pO2     ()  mm/Hg


 


HCO3     (21-28)  mmol/L


 


ABG pH     (7.35-7.45)  


 


ABG Total CO2     (22-28)  mmol.L


 


ABG O2 Saturation     (95-98)  %


 


ABG O2 Content     (15-23)  ML/dl


 


ABG Base Excess     (-2.0-3.0)  mmol/L


 


ABG Hemoglobin     (11.7-17.4)  g/dL


 


ABG Carboxyhemoglobin     (0.5-1.5)  %


 


POC ABG HHb (Measured)     (0-5)  %


 


ABG Methemoglobin     (0.0-3.0)  %


 


ABG O2 Capacity     (16-24)  mL/dl


 


ABG Potassium     (3.6-5.2)  mmol/L


 


VBG pH     (7.32-7.43)  


 


VBG pCO2     (40-60)  


 


VBG HCO3     (21-28)  mmol/l


 


VBG Total CO2     (22-28)  mmol.L


 


VBG O2 Sat (Calc)     (40-65)  %


 


VBG Base Excess     (0.0-2.0)  mmol/L


 


VBG Potassium     (3.6-5.2)  mmol/L


 


Hgb O2 Saturation     (95.0-98.0)  %


 


Sodium  137    (132-148)  mmol/L


 


Chloride  97 L    ()  mmol/L


 


Glucose     ()  mg/dl


 


Lactate     (0.7-2.1)  mmol/L


 


Mechanical Rate     


 


FiO2     %


 


Tidal Volume     


 


PEEP     


 


Potassium  3.8    (3.6-5.0)  mmol/L


 


Carbon Dioxide  29    (21-33)  mmol/L


 


Anion Gap  15    (10-20)  


 


BUN  20    (7-21)  mg/dL


 


Creatinine  0.8    (0.7-1.2)  mg/dl


 


Est GFR (African Amer)  > 60    


 


Est GFR (Non-Af Amer)  > 60    


 


POC Glucose (mg/dL)   150 H   ()  mg/dL


 


Random Glucose  134 H    ()  mg/dL


 


Hemoglobin A1c     (4.2-6.5)  %


 


Calcium  8.8    (8.4-10.5)  mg/dL


 


Phosphorus  4.6 H    (2.5-4.5)  mg/dL


 


Magnesium  1.8    (1.7-2.2)  mg/dL


 


Total Bilirubin     (0.2-1.3)  mg/dL


 


AST     (14-36)  U/L


 


ALT     (7-56)  U/L


 


Alkaline Phosphatase     ()  U/L


 


Troponin I     ng/mL


 


Total Protein     (5.8-8.3)  g/dL


 


Albumin     (3.0-4.8)  g/dL


 


Globulin     gm/dL


 


Albumin/Globulin Ratio     (1.1-1.8)  


 


Triglycerides     ()  mg/dL


 


Cholesterol     (130-200)  mg/dL


 


LDL Cholesterol Direct     (0-129)  mg/dL


 


HDL Cholesterol     (29-60)  mg/dL


 


Lipase     ()  U/L


 


Procalcitonin     (0.19-0.49)  NG/ML


 


Arterial Blood Potassium     (3.6-5.2)  mmol/L


 


Venous Blood Potassium     (3.6-5.2)  mmol/L


 


Digoxin     (0.8-2.0)  ng/mL














  10/04/18 10/04/18 10/04/18 Range/Units





  21:21 17:32 16:50 


 


WBC     (4.5-11.0)  10^3/ul


 


RBC     (3.5-6.1)  10^6/uL


 


Hgb     (12.0-16.0)  g/dL


 


Hct     (36.0-48.0)  %


 


MCV     (80.0-105.0)  fl


 


MCH     (25.0-35.0)  pg


 


MCHC     (31.0-37.0)  g/dl


 


RDW     (11.5-14.5)  %


 


Plt Count     (120.0-450.0)  10^3/uL


 


MPV     (7.0-11.0)  fl


 


Gran %     (50.0-68.0)  %


 


Lymph % (Auto)     (22.0-35.0)  %


 


Mono % (Auto)     (1.0-6.0)  %


 


Eos % (Auto)     (1.5-5.0)  %


 


Baso % (Auto)     (0.0-3.0)  %


 


Gran #     (1.4-6.5)  


 


Lymph # (Auto)     (1.2-3.4)  


 


Mono # (Auto)     (0.1-0.6)  


 


Eos # (Auto)     (0.0-0.7)  


 


Baso # (Auto)     (0.0-2.0)  K/mm3


 


Neutrophils % (Manual)     (50.0-70.0)  %


 


Band Neutrophils %     (0-2)  %


 


Lymphocytes % (Manual)     (22.0-35.0)  %


 


Monocytes % (Manual)     (1.0-6.0)  %


 


Platelet Evaluation     (NORMAL)  


 


pCO2     (35-45)  mm/Hg


 


pO2     ()  mm/Hg


 


HCO3     (21-28)  mmol/L


 


ABG pH     (7.35-7.45)  


 


ABG Total CO2     (22-28)  mmol.L


 


ABG O2 Saturation     (95-98)  %


 


ABG O2 Content     (15-23)  ML/dl


 


ABG Base Excess     (-2.0-3.0)  mmol/L


 


ABG Hemoglobin     (11.7-17.4)  g/dL


 


ABG Carboxyhemoglobin     (0.5-1.5)  %


 


POC ABG HHb (Measured)     (0-5)  %


 


ABG Methemoglobin     (0.0-3.0)  %


 


ABG O2 Capacity     (16-24)  mL/dl


 


ABG Potassium     (3.6-5.2)  mmol/L


 


VBG pH     (7.32-7.43)  


 


VBG pCO2     (40-60)  


 


VBG HCO3     (21-28)  mmol/l


 


VBG Total CO2     (22-28)  mmol.L


 


VBG O2 Sat (Calc)     (40-65)  %


 


VBG Base Excess     (0.0-2.0)  mmol/L


 


VBG Potassium     (3.6-5.2)  mmol/L


 


Hgb O2 Saturation     (95.0-98.0)  %


 


Sodium    138  (132-148)  mmol/L


 


Chloride    101  ()  mmol/L


 


Glucose     ()  mg/dl


 


Lactate     (0.7-2.1)  mmol/L


 


Mechanical Rate     


 


FiO2     %


 


Tidal Volume     


 


PEEP     


 


Potassium    3.7  (3.6-5.0)  mmol/L


 


Carbon Dioxide    26  (21-33)  mmol/L


 


Anion Gap    15  (10-20)  


 


BUN    22 H  (7-21)  mg/dL


 


Creatinine    0.9  (0.7-1.2)  mg/dl


 


Est GFR (African Amer)    > 60  


 


Est GFR (Non-Af Amer)    > 60  


 


POC Glucose (mg/dL)  135 H  130 H   ()  mg/dL


 


Random Glucose    138 H  ()  mg/dL


 


Hemoglobin A1c     (4.2-6.5)  %


 


Calcium    9.0  (8.4-10.5)  mg/dL


 


Phosphorus    3.6  (2.5-4.5)  mg/dL


 


Magnesium    1.9  (1.7-2.2)  mg/dL


 


Total Bilirubin    0.6  (0.2-1.3)  mg/dL


 


AST    38 H  (14-36)  U/L


 


ALT    39  (7-56)  U/L


 


Alkaline Phosphatase    69  ()  U/L


 


Troponin I    0.08  D  ng/mL


 


Total Protein    7.5  (5.8-8.3)  g/dL


 


Albumin    4.2  (3.0-4.8)  g/dL


 


Globulin    3.3  gm/dL


 


Albumin/Globulin Ratio    1.2  (1.1-1.8)  


 


Triglycerides     ()  mg/dL


 


Cholesterol     (130-200)  mg/dL


 


LDL Cholesterol Direct     (0-129)  mg/dL


 


HDL Cholesterol     (29-60)  mg/dL


 


Lipase     ()  U/L


 


Procalcitonin     (0.19-0.49)  NG/ML


 


Arterial Blood Potassium     (3.6-5.2)  mmol/L


 


Venous Blood Potassium     (3.6-5.2)  mmol/L


 


Digoxin     (0.8-2.0)  ng/mL














  10/04/18 10/04/18 10/04/18 Range/Units





  16:50 16:50 16:50 


 


WBC     (4.5-11.0)  10^3/ul


 


RBC     (3.5-6.1)  10^6/uL


 


Hgb     (12.0-16.0)  g/dL


 


Hct     (36.0-48.0)  %


 


MCV     (80.0-105.0)  fl


 


MCH     (25.0-35.0)  pg


 


MCHC     (31.0-37.0)  g/dl


 


RDW     (11.5-14.5)  %


 


Plt Count     (120.0-450.0)  10^3/uL


 


MPV     (7.0-11.0)  fl


 


Gran %     (50.0-68.0)  %


 


Lymph % (Auto)     (22.0-35.0)  %


 


Mono % (Auto)     (1.0-6.0)  %


 


Eos % (Auto)     (1.5-5.0)  %


 


Baso % (Auto)     (0.0-3.0)  %


 


Gran #     (1.4-6.5)  


 


Lymph # (Auto)     (1.2-3.4)  


 


Mono # (Auto)     (0.1-0.6)  


 


Eos # (Auto)     (0.0-0.7)  


 


Baso # (Auto)     (0.0-2.0)  K/mm3


 


Neutrophils % (Manual)     (50.0-70.0)  %


 


Band Neutrophils %     (0-2)  %


 


Lymphocytes % (Manual)     (22.0-35.0)  %


 


Monocytes % (Manual)     (1.0-6.0)  %


 


Platelet Evaluation     (NORMAL)  


 


pCO2     (35-45)  mm/Hg


 


pO2   52   ()  mm/Hg


 


HCO3     (21-28)  mmol/L


 


ABG pH     (7.35-7.45)  


 


ABG Total CO2     (22-28)  mmol.L


 


ABG O2 Saturation     (95-98)  %


 


ABG O2 Content     (15-23)  ML/dl


 


ABG Base Excess     (-2.0-3.0)  mmol/L


 


ABG Hemoglobin     (11.7-17.4)  g/dL


 


ABG Carboxyhemoglobin     (0.5-1.5)  %


 


POC ABG HHb (Measured)     (0-5)  %


 


ABG Methemoglobin     (0.0-3.0)  %


 


ABG O2 Capacity     (16-24)  mL/dl


 


ABG Potassium     (3.6-5.2)  mmol/L


 


VBG pH   7.39   (7.32-7.43)  


 


VBG pCO2   47.0   (40-60)  


 


VBG HCO3   28.5 H   (21-28)  mmol/l


 


VBG Total CO2   29.9 H   (22-28)  mmol.L


 


VBG O2 Sat (Calc)   89.4 H   (40-65)  %


 


VBG Base Excess   2.8 H   (0.0-2.0)  mmol/L


 


VBG Potassium   3.6   (3.6-5.2)  mmol/L


 


Hgb O2 Saturation     (95.0-98.0)  %


 


Sodium   138.0   (132-148)  mmol/L


 


Chloride   101.0   ()  mmol/L


 


Glucose   136 H   ()  mg/dl


 


Lactate   1.6   (0.7-2.1)  mmol/L


 


Mechanical Rate     


 


FiO2   21.0   %


 


Tidal Volume     


 


PEEP     


 


Potassium     (3.6-5.0)  mmol/L


 


Carbon Dioxide     (21-33)  mmol/L


 


Anion Gap     (10-20)  


 


BUN     (7-21)  mg/dL


 


Creatinine     (0.7-1.2)  mg/dl


 


Est GFR (African Amer)     


 


Est GFR (Non-Af Amer)     


 


POC Glucose (mg/dL)     ()  mg/dL


 


Random Glucose     ()  mg/dL


 


Hemoglobin A1c     (4.2-6.5)  %


 


Calcium     (8.4-10.5)  mg/dL


 


Phosphorus     (2.5-4.5)  mg/dL


 


Magnesium     (1.7-2.2)  mg/dL


 


Total Bilirubin     (0.2-1.3)  mg/dL


 


AST     (14-36)  U/L


 


ALT     (7-56)  U/L


 


Alkaline Phosphatase     ()  U/L


 


Troponin I     ng/mL


 


Total Protein     (5.8-8.3)  g/dL


 


Albumin     (3.0-4.8)  g/dL


 


Globulin     gm/dL


 


Albumin/Globulin Ratio     (1.1-1.8)  


 


Triglycerides     ()  mg/dL


 


Cholesterol     (130-200)  mg/dL


 


LDL Cholesterol Direct     (0-129)  mg/dL


 


HDL Cholesterol     (29-60)  mg/dL


 


Lipase     ()  U/L


 


Procalcitonin    0.22  (0.19-0.49)  NG/ML


 


Arterial Blood Potassium     (3.6-5.2)  mmol/L


 


Venous Blood Potassium   3.6   (3.6-5.2)  mmol/L


 


Digoxin  < 0.4 L    (0.8-2.0)  ng/mL














  10/04/18 10/04/18 Range/Units





  16:15 13:33 


 


WBC    (4.5-11.0)  10^3/ul


 


RBC    (3.5-6.1)  10^6/uL


 


Hgb    (12.0-16.0)  g/dL


 


Hct    (36.0-48.0)  %


 


MCV    (80.0-105.0)  fl


 


MCH    (25.0-35.0)  pg


 


MCHC    (31.0-37.0)  g/dl


 


RDW    (11.5-14.5)  %


 


Plt Count    (120.0-450.0)  10^3/uL


 


MPV    (7.0-11.0)  fl


 


Gran %    (50.0-68.0)  %


 


Lymph % (Auto)    (22.0-35.0)  %


 


Mono % (Auto)    (1.0-6.0)  %


 


Eos % (Auto)    (1.5-5.0)  %


 


Baso % (Auto)    (0.0-3.0)  %


 


Gran #    (1.4-6.5)  


 


Lymph # (Auto)    (1.2-3.4)  


 


Mono # (Auto)    (0.1-0.6)  


 


Eos # (Auto)    (0.0-0.7)  


 


Baso # (Auto)    (0.0-2.0)  K/mm3


 


Neutrophils % (Manual)    (50.0-70.0)  %


 


Band Neutrophils %    (0-2)  %


 


Lymphocytes % (Manual)    (22.0-35.0)  %


 


Monocytes % (Manual)    (1.0-6.0)  %


 


Platelet Evaluation    (NORMAL)  


 


pCO2  42   (35-45)  mm/Hg


 


pO2  62.0 L   ()  mm/Hg


 


HCO3  26.6   (21-28)  mmol/L


 


ABG pH  7.41   (7.35-7.45)  


 


ABG Total CO2  27.9   (22-28)  mmol.L


 


ABG O2 Saturation  94.5 L   (95-98)  %


 


ABG O2 Content    (15-23)  ML/dl


 


ABG Base Excess  1.7   (-2.0-3.0)  mmol/L


 


ABG Hemoglobin    (11.7-17.4)  g/dL


 


ABG Carboxyhemoglobin    (0.5-1.5)  %


 


POC ABG HHb (Measured)    (0-5)  %


 


ABG Methemoglobin    (0.0-3.0)  %


 


ABG O2 Capacity    (16-24)  mL/dl


 


ABG Potassium  3.3 L   (3.6-5.2)  mmol/L


 


VBG pH    (7.32-7.43)  


 


VBG pCO2    (40-60)  


 


VBG HCO3    (21-28)  mmol/l


 


VBG Total CO2    (22-28)  mmol.L


 


VBG O2 Sat (Calc)    (40-65)  %


 


VBG Base Excess    (0.0-2.0)  mmol/L


 


VBG Potassium    (3.6-5.2)  mmol/L


 


Hgb O2 Saturation    (95.0-98.0)  %


 


Sodium  137.0   (132-148)  mmol/L


 


Chloride  103.0   ()  mmol/L


 


Glucose  134 H   ()  mg/dl


 


Lactate  1.4   (0.7-2.1)  mmol/L


 


Mechanical Rate  30   


 


FiO2  80.0   %


 


Tidal Volume  400   


 


PEEP  10   


 


Potassium    (3.6-5.0)  mmol/L


 


Carbon Dioxide    (21-33)  mmol/L


 


Anion Gap    (10-20)  


 


BUN    (7-21)  mg/dL


 


Creatinine    (0.7-1.2)  mg/dl


 


Est GFR (African Amer)    


 


Est GFR (Non-Af Amer)    


 


POC Glucose (mg/dL)   184 H  ()  mg/dL


 


Random Glucose    ()  mg/dL


 


Hemoglobin A1c    (4.2-6.5)  %


 


Calcium    (8.4-10.5)  mg/dL


 


Phosphorus    (2.5-4.5)  mg/dL


 


Magnesium    (1.7-2.2)  mg/dL


 


Total Bilirubin    (0.2-1.3)  mg/dL


 


AST    (14-36)  U/L


 


ALT    (7-56)  U/L


 


Alkaline Phosphatase    ()  U/L


 


Troponin I    ng/mL


 


Total Protein    (5.8-8.3)  g/dL


 


Albumin    (3.0-4.8)  g/dL


 


Globulin    gm/dL


 


Albumin/Globulin Ratio    (1.1-1.8)  


 


Triglycerides    ()  mg/dL


 


Cholesterol    (130-200)  mg/dL


 


LDL Cholesterol Direct    (0-129)  mg/dL


 


HDL Cholesterol    (29-60)  mg/dL


 


Lipase    ()  U/L


 


Procalcitonin    (0.19-0.49)  NG/ML


 


Arterial Blood Potassium  3.3 L   (3.6-5.2)  mmol/L


 


Venous Blood Potassium    (3.6-5.2)  mmol/L


 


Digoxin    (0.8-2.0)  ng/mL








                         Laboratory Results - last 24 hr











  10/04/18 10/04/18 10/04/18





  13:33 16:15 16:50


 


WBC   


 


RBC   


 


Hgb   


 


Hct   


 


MCV   


 


MCH   


 


MCHC   


 


RDW   


 


Plt Count   


 


MPV   


 


Gran %   


 


Lymph % (Auto)   


 


Mono % (Auto)   


 


Eos % (Auto)   


 


Baso % (Auto)   


 


Gran #   


 


Lymph # (Auto)   


 


Mono # (Auto)   


 


Eos # (Auto)   


 


Baso # (Auto)   


 


Neutrophils % (Manual)   


 


Band Neutrophils %   


 


Lymphocytes % (Manual)   


 


Monocytes % (Manual)   


 


Platelet Evaluation   


 


pCO2   42 


 


pO2   62.0 L 


 


HCO3   26.6 


 


ABG pH   7.41 


 


ABG Total CO2   27.9 


 


ABG O2 Saturation   94.5 L 


 


ABG O2 Content   


 


ABG Base Excess   1.7 


 


ABG Hemoglobin   


 


ABG Carboxyhemoglobin   


 


POC ABG HHb (Measured)   


 


ABG Methemoglobin   


 


ABG O2 Capacity   


 


ABG Potassium   3.3 L 


 


VBG pH   


 


VBG pCO2   


 


VBG HCO3   


 


VBG Total CO2   


 


VBG O2 Sat (Calc)   


 


VBG Base Excess   


 


VBG Potassium   


 


Hgb O2 Saturation   


 


Sodium   137.0 


 


Chloride   103.0 


 


Glucose   134 H 


 


Lactate   1.4 


 


Mechanical Rate   30 


 


FiO2   80.0 


 


Tidal Volume   400 


 


PEEP   10 


 


Potassium   


 


Carbon Dioxide   


 


Anion Gap   


 


BUN   


 


Creatinine   


 


Est GFR ( Amer)   


 


Est GFR (Non-Af Amer)   


 


POC Glucose (mg/dL)  184 H  


 


Random Glucose   


 


Hemoglobin A1c   


 


Calcium   


 


Phosphorus   


 


Magnesium   


 


Total Bilirubin   


 


AST   


 


ALT   


 


Alkaline Phosphatase   


 


Troponin I   


 


Total Protein   


 


Albumin   


 


Globulin   


 


Albumin/Globulin Ratio   


 


Triglycerides   


 


Cholesterol   


 


LDL Cholesterol Direct   


 


HDL Cholesterol   


 


Lipase   


 


Procalcitonin    0.22


 


Arterial Blood Potassium   3.3 L 


 


Venous Blood Potassium   


 


Digoxin   














  10/04/18 10/04/18 10/04/18





  16:50 16:50 16:50


 


WBC   


 


RBC   


 


Hgb   


 


Hct   


 


MCV   


 


MCH   


 


MCHC   


 


RDW   


 


Plt Count   


 


MPV   


 


Gran %   


 


Lymph % (Auto)   


 


Mono % (Auto)   


 


Eos % (Auto)   


 


Baso % (Auto)   


 


Gran #   


 


Lymph # (Auto)   


 


Mono # (Auto)   


 


Eos # (Auto)   


 


Baso # (Auto)   


 


Neutrophils % (Manual)   


 


Band Neutrophils %   


 


Lymphocytes % (Manual)   


 


Monocytes % (Manual)   


 


Platelet Evaluation   


 


pCO2   


 


pO2  52  


 


HCO3   


 


ABG pH   


 


ABG Total CO2   


 


ABG O2 Saturation   


 


ABG O2 Content   


 


ABG Base Excess   


 


ABG Hemoglobin   


 


ABG Carboxyhemoglobin   


 


POC ABG HHb (Measured)   


 


ABG Methemoglobin   


 


ABG O2 Capacity   


 


ABG Potassium   


 


VBG pH  7.39  


 


VBG pCO2  47.0  


 


VBG HCO3  28.5 H  


 


VBG Total CO2  29.9 H  


 


VBG O2 Sat (Calc)  89.4 H  


 


VBG Base Excess  2.8 H  


 


VBG Potassium  3.6  


 


Hgb O2 Saturation   


 


Sodium  138.0   138


 


Chloride  101.0   101


 


Glucose  136 H  


 


Lactate  1.6  


 


Mechanical Rate   


 


FiO2  21.0  


 


Tidal Volume   


 


PEEP   


 


Potassium    3.7


 


Carbon Dioxide    26


 


Anion Gap    15


 


BUN    22 H


 


Creatinine    0.9


 


Est GFR ( Amer)    > 60


 


Est GFR (Non-Af Amer)    > 60


 


POC Glucose (mg/dL)   


 


Random Glucose    138 H


 


Hemoglobin A1c   


 


Calcium    9.0


 


Phosphorus    3.6


 


Magnesium    1.9


 


Total Bilirubin    0.6


 


AST    38 H


 


ALT    39


 


Alkaline Phosphatase    69


 


Troponin I    0.08  D


 


Total Protein    7.5


 


Albumin    4.2


 


Globulin    3.3


 


Albumin/Globulin Ratio    1.2


 


Triglycerides   


 


Cholesterol   


 


LDL Cholesterol Direct   


 


HDL Cholesterol   


 


Lipase   


 


Procalcitonin   


 


Arterial Blood Potassium   


 


Venous Blood Potassium  3.6  


 


Digoxin   < 0.4 L 














  10/04/18 10/04/18 10/04/18





  17:32 21:21 22:40


 


WBC    15.5 H D


 


RBC    4.70


 


Hgb    14.3


 


Hct    42.5


 


MCV    90.4  D


 


MCH    30.4


 


MCHC    33.6


 


RDW    14.2


 


Plt Count    99 L


 


MPV    12.8 H


 


Gran %    87.3 H


 


Lymph % (Auto)    5.6 L


 


Mono % (Auto)    7.0 H


 


Eos % (Auto)    0.0 L


 


Baso % (Auto)    0.1


 


Gran #    13.50 H


 


Lymph # (Auto)    0.9 L


 


Mono # (Auto)    1.1 H


 


Eos # (Auto)    0.0


 


Baso # (Auto)    0.01


 


Neutrophils % (Manual)   


 


Band Neutrophils %   


 


Lymphocytes % (Manual)   


 


Monocytes % (Manual)   


 


Platelet Evaluation   


 


pCO2   


 


pO2   


 


HCO3   


 


ABG pH   


 


ABG Total CO2   


 


ABG O2 Saturation   


 


ABG O2 Content   


 


ABG Base Excess   


 


ABG Hemoglobin   


 


ABG Carboxyhemoglobin   


 


POC ABG HHb (Measured)   


 


ABG Methemoglobin   


 


ABG O2 Capacity   


 


ABG Potassium   


 


VBG pH   


 


VBG pCO2   


 


VBG HCO3   


 


VBG Total CO2   


 


VBG O2 Sat (Calc)   


 


VBG Base Excess   


 


VBG Potassium   


 


Hgb O2 Saturation   


 


Sodium   


 


Chloride   


 


Glucose   


 


Lactate   


 


Mechanical Rate   


 


FiO2   


 


Tidal Volume   


 


PEEP   


 


Potassium   


 


Carbon Dioxide   


 


Anion Gap   


 


BUN   


 


Creatinine   


 


Est GFR ( Amer)   


 


Est GFR (Non-Af Amer)   


 


POC Glucose (mg/dL)  130 H  135 H 


 


Random Glucose   


 


Hemoglobin A1c   


 


Calcium   


 


Phosphorus   


 


Magnesium   


 


Total Bilirubin   


 


AST   


 


ALT   


 


Alkaline Phosphatase   


 


Troponin I   


 


Total Protein   


 


Albumin   


 


Globulin   


 


Albumin/Globulin Ratio   


 


Triglycerides   


 


Cholesterol   


 


LDL Cholesterol Direct   


 


HDL Cholesterol   


 


Lipase   


 


Procalcitonin   


 


Arterial Blood Potassium   


 


Venous Blood Potassium   


 


Digoxin   














  10/04/18 10/05/18 10/05/18





  23:22 00:05 03:37


 


WBC   


 


RBC   


 


Hgb   


 


Hct   


 


MCV   


 


MCH   


 


MCHC   


 


RDW   


 


Plt Count   


 


MPV   


 


Gran %   


 


Lymph % (Auto)   


 


Mono % (Auto)   


 


Eos % (Auto)   


 


Baso % (Auto)   


 


Gran #   


 


Lymph # (Auto)   


 


Mono # (Auto)   


 


Eos # (Auto)   


 


Baso # (Auto)   


 


Neutrophils % (Manual)   


 


Band Neutrophils %   


 


Lymphocytes % (Manual)   


 


Monocytes % (Manual)   


 


Platelet Evaluation   


 


pCO2   


 


pO2   


 


HCO3   


 


ABG pH   


 


ABG Total CO2   


 


ABG O2 Saturation   


 


ABG O2 Content   


 


ABG Base Excess   


 


ABG Hemoglobin   


 


ABG Carboxyhemoglobin   


 


POC ABG HHb (Measured)   


 


ABG Methemoglobin   


 


ABG O2 Capacity   


 


ABG Potassium   


 


VBG pH   


 


VBG pCO2   


 


VBG HCO3   


 


VBG Total CO2   


 


VBG O2 Sat (Calc)   


 


VBG Base Excess   


 


VBG Potassium   


 


Hgb O2 Saturation   


 


Sodium   137 


 


Chloride   97 L 


 


Glucose   


 


Lactate   


 


Mechanical Rate   


 


FiO2   


 


Tidal Volume   


 


PEEP   


 


Potassium   3.8 


 


Carbon Dioxide   29 


 


Anion Gap   15 


 


BUN   20 


 


Creatinine   0.8 


 


Est GFR ( Amer)   > 60 


 


Est GFR (Non-Af Amer)   > 60 


 


POC Glucose (mg/dL)  150 H   132 H


 


Random Glucose   134 H 


 


Hemoglobin A1c   


 


Calcium   8.8 


 


Phosphorus   4.6 H 


 


Magnesium   1.8 


 


Total Bilirubin   


 


AST   


 


ALT   


 


Alkaline Phosphatase   


 


Troponin I   


 


Total Protein   


 


Albumin   


 


Globulin   


 


Albumin/Globulin Ratio   


 


Triglycerides   


 


Cholesterol   


 


LDL Cholesterol Direct   


 


HDL Cholesterol   


 


Lipase   


 


Procalcitonin   


 


Arterial Blood Potassium   


 


Venous Blood Potassium   


 


Digoxin   














  10/05/18 10/05/18 10/05/18





  05:29 05:30 05:30


 


WBC   17.9 H 


 


RBC   4.90 


 


Hgb   15.1 


 


Hct   44.7 


 


MCV   91.2 


 


MCH   30.8 


 


MCHC   33.8 


 


RDW   14.3 


 


Plt Count   180 


 


MPV   12.5 H 


 


Gran %   84.5 H 


 


Lymph % (Auto)   4.2 L 


 


Mono % (Auto)   11.2 H 


 


Eos % (Auto)   0.0 L 


 


Baso % (Auto)   0.1 


 


Gran #   15.17 H 


 


Lymph # (Auto)   0.8 L 


 


Mono # (Auto)   2.0 H 


 


Eos # (Auto)   0.0 


 


Baso # (Auto)   0.01 


 


Neutrophils % (Manual)   89 H 


 


Band Neutrophils %   3 H 


 


Lymphocytes % (Manual)   7 L 


 


Monocytes % (Manual)   1 


 


Platelet Evaluation   Normal 


 


pCO2   


 


pO2   


 


HCO3   


 


ABG pH   


 


ABG Total CO2   


 


ABG O2 Saturation   


 


ABG O2 Content   


 


ABG Base Excess   


 


ABG Hemoglobin   


 


ABG Carboxyhemoglobin   


 


POC ABG HHb (Measured)   


 


ABG Methemoglobin   


 


ABG O2 Capacity   


 


ABG Potassium   


 


VBG pH   


 


VBG pCO2   


 


VBG HCO3   


 


VBG Total CO2   


 


VBG O2 Sat (Calc)   


 


VBG Base Excess   


 


VBG Potassium   


 


Hgb O2 Saturation   


 


Sodium    133


 


Chloride    97 L


 


Glucose   


 


Lactate   


 


Mechanical Rate   


 


FiO2   


 


Tidal Volume   


 


PEEP   


 


Potassium    3.8


 


Carbon Dioxide    26


 


Anion Gap    15


 


BUN    18


 


Creatinine    0.7


 


Est GFR ( Amer)    > 60


 


Est GFR (Non-Af Amer)    > 60


 


POC Glucose (mg/dL)  161 H  


 


Random Glucose    134 H


 


Hemoglobin A1c   


 


Calcium    8.2 L


 


Phosphorus    3.6


 


Magnesium    1.7


 


Total Bilirubin    1.3


 


AST    60 H D


 


ALT    28


 


Alkaline Phosphatase    62


 


Troponin I   


 


Total Protein    7.3


 


Albumin    3.7


 


Globulin    3.5


 


Albumin/Globulin Ratio    1.1


 


Triglycerides    1994 H


 


Cholesterol    160


 


LDL Cholesterol Direct    71


 


HDL Cholesterol    35


 


Lipase   


 


Procalcitonin   


 


Arterial Blood Potassium   


 


Venous Blood Potassium   


 


Digoxin   














  10/05/18 10/05/18 10/05/18





  05:30 06:00 07:33


 


WBC   


 


RBC   


 


Hgb   


 


Hct   


 


MCV   


 


MCH   


 


MCHC   


 


RDW   


 


Plt Count   


 


MPV   


 


Gran %   


 


Lymph % (Auto)   


 


Mono % (Auto)   


 


Eos % (Auto)   


 


Baso % (Auto)   


 


Gran #   


 


Lymph # (Auto)   


 


Mono # (Auto)   


 


Eos # (Auto)   


 


Baso # (Auto)   


 


Neutrophils % (Manual)   


 


Band Neutrophils %   


 


Lymphocytes % (Manual)   


 


Monocytes % (Manual)   


 


Platelet Evaluation   


 


pCO2   29 L 


 


pO2   151.0 H 


 


HCO3   22.6 


 


ABG pH   7.50 H 


 


ABG Total CO2   23.5 


 


ABG O2 Saturation   99.7 H 


 


ABG O2 Content   21.8 


 


ABG Base Excess   0.6 


 


ABG Hemoglobin   15.8 


 


ABG Carboxyhemoglobin   1.5 


 


POC ABG HHb (Measured)   0.3 


 


ABG Methemoglobin   1.1 


 


ABG O2 Capacity   21.9 


 


ABG Potassium   


 


VBG pH   


 


VBG pCO2   


 


VBG HCO3   


 


VBG Total CO2   


 


VBG O2 Sat (Calc)   


 


VBG Base Excess   


 


VBG Potassium   


 


Hgb O2 Saturation   97.1 


 


Sodium   


 


Chloride   


 


Glucose   


 


Lactate   


 


Mechanical Rate   


 


FiO2   80.0 


 


Tidal Volume   


 


PEEP   


 


Potassium   


 


Carbon Dioxide   


 


Anion Gap   


 


BUN   


 


Creatinine   


 


Est GFR ( Amer)   


 


Est GFR (Non-Af Amer)   


 


POC Glucose (mg/dL)    195 H


 


Random Glucose   


 


Hemoglobin A1c  6.5  


 


Calcium   


 


Phosphorus   


 


Magnesium   


 


Total Bilirubin   


 


AST   


 


ALT   


 


Alkaline Phosphatase   


 


Troponin I   


 


Total Protein   


 


Albumin   


 


Globulin   


 


Albumin/Globulin Ratio   


 


Triglycerides   


 


Cholesterol   


 


LDL Cholesterol Direct   


 


HDL Cholesterol   


 


Lipase   


 


Procalcitonin   


 


Arterial Blood Potassium   


 


Venous Blood Potassium   


 


Digoxin   














  10/05/18 10/05/18





  10:45 11:41


 


WBC  


 


RBC  


 


Hgb  


 


Hct  


 


MCV  


 


MCH  


 


MCHC  


 


RDW  


 


Plt Count  


 


MPV  


 


Gran %  


 


Lymph % (Auto)  


 


Mono % (Auto)  


 


Eos % (Auto)  


 


Baso % (Auto)  


 


Gran #  


 


Lymph # (Auto)  


 


Mono # (Auto)  


 


Eos # (Auto)  


 


Baso # (Auto)  


 


Neutrophils % (Manual)  


 


Band Neutrophils %  


 


Lymphocytes % (Manual)  


 


Monocytes % (Manual)  


 


Platelet Evaluation  


 


pCO2  


 


pO2  


 


HCO3  


 


ABG pH  


 


ABG Total CO2  


 


ABG O2 Saturation  


 


ABG O2 Content  


 


ABG Base Excess  


 


ABG Hemoglobin  


 


ABG Carboxyhemoglobin  


 


POC ABG HHb (Measured)  


 


ABG Methemoglobin  


 


ABG O2 Capacity  


 


ABG Potassium  


 


VBG pH  


 


VBG pCO2  


 


VBG HCO3  


 


VBG Total CO2  


 


VBG O2 Sat (Calc)  


 


VBG Base Excess  


 


VBG Potassium  


 


Hgb O2 Saturation  


 


Sodium  


 


Chloride  


 


Glucose  


 


Lactate  


 


Mechanical Rate  


 


FiO2  


 


Tidal Volume  


 


PEEP  


 


Potassium  


 


Carbon Dioxide  


 


Anion Gap  


 


BUN  


 


Creatinine  


 


Est GFR ( Amer)  


 


Est GFR (Non-Af Amer)  


 


POC Glucose (mg/dL)   214 H


 


Random Glucose  


 


Hemoglobin A1c  


 


Calcium  


 


Phosphorus  


 


Magnesium  


 


Total Bilirubin  


 


AST  


 


ALT  


 


Alkaline Phosphatase  


 


Troponin I  


 


Total Protein  


 


Albumin  


 


Globulin  


 


Albumin/Globulin Ratio  


 


Triglycerides  


 


Cholesterol  


 


LDL Cholesterol Direct  


 


HDL Cholesterol  


 


Lipase  89 


 


Procalcitonin  


 


Arterial Blood Potassium  


 


Venous Blood Potassium  


 


Digoxin  











EKG/Cardiology Studies: 


Cardiology / EKG Studies





10/04/18 14:31


EKG [ELECTROCARDIOGRAM] Stat 


   Comment: 


   Reason For Exam: CARDAC ARREST





10/04/18 23:49


EKG [ELECTROCARDIOGRAM] Stat 


   Comment: 


   Reason For Exam: code freeze


   Does Patient Have a Pacemaker?: No


   PERFORMING PHYSICIAN/PROVIDER:: Booker Peck














Assessment/Plan





- Assessment and Plan (Free Text)


Plan: 


Patient seen and examined on rounds with resident, agree with note with 

following additions/exceptions:


Patient is 59yo female with PMHx of DM2, HTN, COPD, and chronic AFib, mobird 

obesity, presented s/p PEA cardiac arrest in the field, unclear down time, and 

CPR time, intubated


Patient was taken to cath, NICM, no stents placed


CT PE protocol negative for PE


Currently intubated sedated, on Fentanyl, Propofol, on hypothermic protocol


Cardiology, Neurology following, ID following


On exam difficult to assess neurological function due to sedation


Labs, imaging, chart reviewed


CXR today with diffuse biletaral opacities, rule out Pulm edema, ARDS





Cardiac Arrest


NICM


DM


HTN


COPD


Afib


Morbid Obesity


r/o PNA, pulm edema





Recommend:


- cont with vent support, low tidal volume ventilation, decrease RR to 20, FiO2 

to 50%


- repeat ABG


- Lasix IV diuresis


- Cont with Dobutamine, Amio drip


- follow up ECHO


- follow up cardiology


- switch Propofol to Fentanyl Versed, triglycerides 1900


- check Lipase, start Lopid, May need insulin drip


- follow neurology, VEEG


- hypothermic protocol, goal temp 34 C 


- reasses neurological function after hypothermic protocol concludes


- Adequate sedation


- FS control


- GI ppx


- DVT ppx


- Monitor in MICU





Prognosis is extremely poor





Critical care time 45 minutes

## 2018-10-05 NOTE — CON
DATE:  10/03/2018



NEUROLOGY CONSULT NOTE



HISTORY OF PRESENT ILLNESS:  A 58-year-old woman who complained of chest

pain at approximately 8 o'clock this morning.  She has a past medical

history of diabetes, hypertension, COPD, in atrial fibrillation.  She was

brought into the emergency room via ambulance after she was found down on

the ground, noted to be in cardiac arrest, TIA and intubated on the field. 

EMS notes that the patient complained of chest pain, was speaking clearly

and upon EMS arrival patient went to cardiac arrest.  After EMS patient was

diaphoretic.  Vital signs on initial presentation were 129/108 blood

pressure, pulse rate 88, respiratory rate 20, O2 sat 98, but that up from

80% when she first tried.  She was intubated, given CPR and given four

aspirin in the filed.  After starting to make the CPR pulse was started. 

Patient was placed on sedation.  Neurology consult was called.  Apparently

the family says the patient was short of breath for several days before

presentation.  No further history is noted.



REVIEW OF SYSTEMS:  Not possible secondary to the patient's level of

consciousness.



PAST MEDICAL HISTORY:  TIA in the past, diabetes type 2, hypothyroidism,

hypertension.



PAST SURGICAL HISTORY:  Appendectomy, tonsillectomy, cholecystectomy.



FAMILY HISTORY:  Patient is , has several children.



SOCIAL HISTORY:  Use to smoke three cigarettes a day for 15 years.  Alcohol

use is not known.



ALLERGIES:  THERE ARE NO KNOWN DRUG ALLERGIES.



PHYSICAL EXAMINATION:

GENERAL:  On exam, patient is intubated and sedated.  She has reactive

pupils, 2 mm, diminished to light.  There is no doll's eyes.  There is no

corneal.  However, on trying to lift a gag, the patient did move her arms

bilaterally; however, she was not posturing, reflexes are 2+ in upper and

lower bilaterally, there is spontaneous movement, there is verbal output.



MEDICATIONS:  She is on following medications at home.  Aspirin 81 mg

daily, carvedilol 25 mg twice a day, Lasix 40 mg daily, Diovan 160 mg

daily, Tamoxifen 37.5 mg daily, Ambien 5 mg every night.



LABORATORY DATA:  Patient is currently going to cardiac cath.  CAT scan of

the head is normal with no stroke or hemorrhage noted.  Chest x-ray shows

no findings.



IMPRESSION:  This is a 58-year-old woman status post cardiac arrest, _____

anoxic encephalopathy.  At present, there is no clinical seizure activity

noted.  She is moving to gag reflex, and there is no posturing noted. 

However, she is on sedation at present.  Currently, she is going for

cardiac catheterization.  It is possible that she still does have anoxic

encephalopathy.



PLAN:

1.  Video EEG 24 hours in the morning.

2.  Continue aspirin.

3.  MRI of the brain when patient is stable.



Thank you for consulting Neurology.





__________________________________________

Enrique Man MD



DD:  10/04/2018 14:40:19

DT:  10/04/2018 17:51:28

Job # [id number]

## 2018-10-05 NOTE — CP.PCM.PN
Addendum entered and electronically signed by Mitzi Franco DO  10/05/18 

13:54: 





Doxy 100mg Started 10-4 day 2


Flagyl 500mg Started 10-4 day 2


Cefepime 2gm Started 10-5 day 1





Original Note:








<Mitzi Franco - Last Filed: 10/05/18 13:34>





Subjective





- Date & Time of Evaluation


Date of Evaluation: 10/05/18


Time of Evaluation: 09:30





- Subjective


Subjective: 





PGY-1 Medicine Progress Note for Dr. Prajapati's service





Patient seen and examined at bedside. Patient sedated and intubated. 12 point 

ROS due to patient clinical condition.





Objective





- Vital Signs/Intake and Output


Vital Signs (last 24 hours): 


                                        











Temp Pulse Resp BP Pulse Ox


 


 95.2 F L  74   20   126/81   98 


 


 10/05/18 13:14  10/05/18 13:14  10/05/18 06:54  10/05/18 13:15  10/05/18 13:14








Intake and Output: 


                                        











 10/05/18 10/05/18





 06:59 18:59


 


Intake Total 575 226


 


Output Total 2100 


 


Balance -1525 226














- Medications


Medications: 


                               Current Medications





Amiodarone HCl (Cordarone)  400 mg PO TID [facility]


   Stop: 10/07/18 23:59


   Last Admin: 10/05/18 09:13 Dose:  Not Given


Amiodarone HCl (Cordarone)  200 mg PO DAILY [facility]


Aspirin (Aspirin Chewable)  81 mg NG DAILY [facility]


   Last Admin: 10/05/18 10:56 Dose:  81 mg


Atorvastatin Calcium (Lipitor)  80 mg NG DIN [facility]


Gemfibrozil (Lopid)  600 mg PO BID [facility]


   Last Admin: 10/05/18 10:59 Dose:  600 mg


Heparin Sodium (Porcine) (Heparin)  5,000 units SC Q8 [facility]; Protocol


   Last Admin: 10/05/18 05:44 Dose:  5,000 units


Dobutamine HCl/Dextrose (Dobutamine/Dextrose 5% 500mg/250ml)  500 mg in 250 mls 

@ 7.62 mls/hr IV .Q24H PRN; Protocol


   PRN Reason: TITRATE PER PROTOCOL


   Last Titration: 10/05/18 07:50 Dose:  4 mcg/kg/min, 15.241 mls/hr


Heparin Sodium/Sodium Chloride (Heparin 90509 Units/250ml 1/2 Normal Saline)  

25,000 units in 250 mls @ 15.241 mls/hr IV .[id number] [facility]; Protocol


   Last Admin: 10/04/18 11:30 Dose:  Not Given


Levetiracetam (Keppra 500mg Ivpb)  500 mg in 100 mls @ 400 mls/hr IVPB Q12 [facility]


   Last Admin: 10/05/18 09:12 Dose:  400 mls/hr


Cisatracurium Besylate 200 mg/ (Sodium Chloride)  270 mls @ 4.66 mls/hr IV .Q24H

PRN; Protocol


   PRN Reason: TITRATE PER MD ORDER


   Last Titration: 10/05/18 07:00 Dose:  0.5 mcg/kg/min, 4.66 mls/hr


Acetaminophen (Ofirmev)  1,000 mg in 100 mls @ 400 mls/hr IVPB Q6H PRN


   PRN Reason: for T>100F


   Stop: 10/06/18 16:17


Doxycycline Hyclate 100 mg/ (Sodium Chloride)  100 mls @ 100 mls/hr IVPB Q12 

[facility]; Protocol


   Last Admin: 10/05/18 10:45 Dose:  100 mls/hr


Metronidazole (Flagyl)  500 mg in 100 mls @ 100 mls/hr IVPB Q8 [facility]; Protocol


   Last Admin: 10/05/18 05:45 Dose:  100 mls/hr


Amiodarone HCl/Dextrose (Nexterone 360 Mg In D5w 200 Ml (Premix))  360 mg in 200

mls @ 16.667 mls/hr IV .Q12H [facility]; Protocol


   Last Admin: 10/04/18 21:53 Dose:  16.667 mls/hr


Cefepime HCl (Maxipime 2gm)  2 gm in 100 mls @ 100 mls/hr IVPB Q8 [facility]; Protocol


   Stop: 10/10/18 08:01


   Last Admin: 10/05/18 09:00 Dose:  100 mls/hr


Fentanyl Citrate (Fentanyl Citrate/Sodium Chloride 1 Mg/100 Ml)  1,000 mcg in 

100 mls @ 10 mls/hr IV .Q10H PRN; Protocol


   PRN Reason: TITRATE PER MD ORDER


   Last Titration: 10/05/18 13:07 Dose:  40 mcg/hr, 4 mls/hr


Midazolam 100 mg/100ml in NS (Midazolam 100 Mg/100ml In Ns)  100 mg in 100 mls @

1 mls/hr IV .Q24H [facility]; Protocol


   PRN Reason: Sedation


   Last Titration: 10/05/18 11:30 Dose:  2 mg/hr, 2 mls/hr


Insulin Human Regular (Humulin R Med)  0 units SC Q2H [facility]; Protocol


   Last Admin: 10/05/18 11:56 Dose:  3 unit


Pantoprazole Sodium (Protonix Inj)  40 mg IVP DAILY [facility]


   Last Admin: 10/05/18 09:12 Dose:  40 mg











- Labs


Labs: 


                                        





                                 10/05/18 05:30 





                                 10/05/18 05:30 





                                        











PT  12.7 SECONDS (9.4-12.5)  H  10/04/18  09:15    


 


INR  1.10   10/04/18  09:15    


 


APTT  27.3 Seconds (25.1-36.5)   10/04/18  09:15    














- Constitutional


Appears: No Acute Distress





- Head Exam


Head Exam: NORMAL INSPECTION, NORMOCEPHALIC





- Respiratory Exam


Additional comments: 





patient intubated with ET tube 


NG tube in place





- Cardiovascular Exam


Cardiovascular Exam: Bradycardia, REGULAR RHYTHM, +S1, +S2





- GI/Abdominal Exam


GI & Abdominal Exam: Soft, Normal Bowel Sounds.  absent: Distended, Firm, 

Tenderness





-  Exam


Additional comments: 





serra cather in place





- Neurological Exam


Neurological Exam: absent: Alert, Awake





- Skin


Skin Exam: Intact, Normal Color





Assessment and Plan





- Assessment and Plan (Free Text)


Assessment: 





Patient is a 57 yo female with PMH of systolic CHF, HTN, DM, COPD, AFib who 

presents to hospital s/p cardiac arrest. Patient currently intubated/sedated. 

Cardiac cath showed no thrombus, mild nonobstructive CAD only in mid circumflex 

60-70%; EF 15-20%; CT chest showed aspiration pneumonia; PNA studies pending


Plan: 





Cardiac Arrest


Cardio Consult- Dr. Peck- Cardiac cath performed- showing mild CAD and EF of 10-

15%


Neuro Consult- Dr. Man- Video EEG inconclusive study; anoxic brain injury vs 

siezures (less likely) 


ICU consult- Dr. Danielson- Keppra 500mg for seizure ppx


EKG- sinus tachycardia with T wave inversions


Patient intubated with ET tube; NG tube; Propofol drip; ABG shows primary 

respiratory acidosis


CT head no acute findings


Dobutamine drip; Amiodarone 200mg po daily; Amiodarone drip


Aspirin 81 NG


Venous dopplers pending





Aspiration Pneumonia


ID consulted- recs appreciated


Cefepime, Flagyl, Doxycycline for empiric coverage


10-5-18 Cxray shows resolution of upper lobe infiltrate


PNA studies pending


Bcx, Ucx, Sputum cx pending


Leukocytosis trending up; Repeat CMP in AM





Hypertriglycredemia


Lopoid 600mg po bid





HTN


Normotensive continue to monitor





DM2


ISS low dose


ACHS





Hx of COPD


Patient currently vented on Versed


RR- 16; TV- 400; Peep- 5; FiO2- 50





PPx


GI ppx- Protonix 40mg IVP daily


DVT ppx- Heparin 5000 units sc





Medical Management discussed with Dr. Prajapati


PGY-1 Mitzi Franco





<Booker Prajapati - Last Filed: 10/06/18 19:09>





Objective





- Vital Signs/Intake and Output


Vital Signs (last 24 hours): 


                                        











Temp Pulse Resp BP Pulse Ox


 


 100.6 F H  107 H  20   98/48 L  96 


 


 10/06/18 18:25  10/06/18 18:22  10/06/18 17:55  10/06/18 18:22  10/06/18 17:55








Intake and Output: 


                                        











 10/06/18 10/07/18





 18:59 06:59


 


Intake Total 958 


 


Output Total 1150 


 


Balance -192 














- Medications


Medications: 


                               Current Medications





Amiodarone HCl (Cordarone)  400 mg PO TID [facility]


   Stop: 10/07/18 23:59


   Last Admin: 10/06/18 18:22 Dose:  400 mg


Amiodarone HCl (Cordarone)  200 mg PO DAILY [facility]


Aspirin (Aspirin Chewable)  81 mg NG DAILY [facility]


   Last Admin: 10/06/18 11:30 Dose:  81 mg


Atorvastatin Calcium (Lipitor)  80 mg NG DIN [facility]


   Last Admin: 10/06/18 17:34 Dose:  80 mg


Furosemide (Lasix)  40 mg IVP BID [facility]


   Last Admin: 10/06/18 17:38 Dose:  40 mg


Gemfibrozil (Lopid)  600 mg PO BID [facility]


   Last Admin: 10/06/18 17:37 Dose:  600 mg


Heparin Sodium (Porcine) (Heparin)  5,000 units SC Q8 [facility]; Protocol


   Last Admin: 10/06/18 13:48 Dose:  5,000 units


Heparin Sodium/Sodium Chloride (Heparin 73611 Units/250ml 1/2 Normal Saline)  

25,000 units in 250 mls @ 15.241 mls/hr IV .[id number] [facility]; Protocol


   Last Admin: 10/04/18 11:30 Dose:  Not Given


Cisatracurium Besylate 200 mg/ (Sodium Chloride)  270 mls @ 4.66 mls/hr IV .Q24H

PRN; Protocol


   PRN Reason: TITRATE PER MD ORDER


   Last Titration: 10/06/18 07:30 Dose:  0 mcg/kg/min, 0 mls/hr


Doxycycline Hyclate 100 mg/ (Sodium Chloride)  100 mls @ 100 mls/hr IVPB Q12 

[facility]; Protocol


   Last Admin: 10/06/18 12:56 Dose:  100 mls/hr


Metronidazole (Flagyl)  500 mg in 100 mls @ 100 mls/hr IVPB Q8 [facility]; Protocol


   Last Admin: 10/06/18 13:48 Dose:  100 mls/hr


Cefepime HCl (Maxipime 2gm)  2 gm in 100 mls @ 100 mls/hr IVPB Q8 [facility]; Protocol


   Stop: 10/10/18 08:01


   Last Admin: 10/06/18 17:39 Dose:  100 mls/hr


Vancomycin HCl (Vancomycin 1gm)  1 gm in 250 mls @ 167 mls/hr IVPB Q12H [facility]; 

Protocol


   Last Admin: 10/06/18 12:58 Dose:  167 mls/hr


Dobutamine HCl/Dextrose (Dobutamine/Dextrose 5% 500mg/250ml)  500 mg in 250 mls 

@ 17.25 mls/hr IV .[id number] PRN; Protocol


   PRN Reason: TITRATE PER PROTOCOL


   Last Titration: 10/06/18 15:35 Dose:  5 mcg/kg/min, 17.25 mls/hr


Midazolam 100 mg/100ml in NS (Midazolam 100 Mg/100ml In Ns)  100 mg in 100 mls @

5 mls/hr IV .Q20H PRN; Protocol


   PRN Reason: Sedation


   Last Admin: 10/06/18 15:00 Dose:  5 mg/hr, 5 mls/hr


Levetiracetam 1,500 mg/ Sodium (Chloride)  115 mls @ 460 mls/hr IV Q12 [facility]


Insulin Human Regular (Humulin R Med)  0 units SC Q6H [facility]; Protocol


   Last Admin: 10/06/18 17:38 Dose:  Not Given


Magnesium Oxide (Mag-Ox)  400 mg PO BID [facility]


   Stop: 10/07/18 23:59


   Last Admin: 10/06/18 17:36 Dose:  400 mg


Pantoprazole Sodium (Protonix Inj)  40 mg IVP DAILY [facility]


   Last Admin: 10/06/18 10:00 Dose:  40 mg











- Labs


Labs: 


                                        





                                 10/06/18 06:30 





                                 10/06/18 06:30 





                                        











PT  12.7 SECONDS (9.4-12.5)  H  10/04/18  09:15    


 


INR  1.10   10/04/18  09:15    


 


APTT  27.3 Seconds (25.1-36.5)   10/04/18  09:15    














Attending/Attestation





- Attestation


I have personally seen and examined this patient.: Yes


I have fully participated in the care of the patient.: Yes


I have reviewed all pertinent clinical information, including history, physical 

exam and plan: Yes


Notes (Text): 





10/06/18 19:09


Medical record note made by the resident after discussion with my direction and 

input after the patient was personally seen and examined by me. I have reviewed 

the chart and agree that the record accurately reflects by personal performance 

of the history, physical exam, data review, and medical decision-making, in the 

course for the patient. I have also personally directed the plan of care.

## 2018-10-05 NOTE — CP.PCM.CON
History of Present Illness





- History of Present Illness


History of Present Illness: 


58 year old female with PMH of chronic CHF, HTN, DM, COPD, atrial fibrillation, 

morbid obesity with BMI 40 was brought in to [facility] [place] after the patient suffered 

cardiorespiratory arrest. She was apparently at home when she started complainin

g of chest pain and then suddenly became unresponsive. Apparently EMS found 

diaphoretic and pulseless. She was resuscitated and intubated in the field. She 

is now in the ICU on the ventilator. The patient is unresponsive to tactile or 

verbal stimuli. She was found to have leukocytosis and CXR and CT scan was 

suggestive of bibasilar consolidation. Full ROS is unobtainable since the 

patient is unresponsive. Infectious Diseases consult is requested to further 

evaluate and manage.





Review of Systems





- Review of Systems


Systems not reviewed;Unavailable: Altered Mental Status





Past Patient History





- Infectious Disease


Hx of Infectious Diseases: None





- Tetanus Immunizations


Tetanus Immunization: Unknown





- Past Social History


Smoking Status: Former Smoker





- CARDIAC


Hx Congestive Heart Failure: Yes


Hx Hypertension: Yes





- PULMONARY


Hx Respiratory Disorders: Yes (USED TO SMOKE CIGARETTES 3 CIG A DAY FOR 15 YRS.)





- NEUROLOGICAL


HX Cerebrovascular Accident: Yes (TIA)





- HEENT


Hx HEENT Problems: No





- RENAL


Hx Chronic Kidney Disease: No





- ENDOCRINE/METABOLIC


Hx Diabetes Mellitus Type 2: Yes


Hx Hypothyroidism: Yes





- HEMATOLOGICAL/ONCOLOGICAL


Hx Blood Disorders: No





- INTEGUMENTARY


Hx Dermatological Problems: No





- MUSCULOSKELETAL/RHEUMATOLOGICAL


Hx Arthritis: Yes





- GASTROINTESTINAL


Hx Gastrointestinal Disorders: Yes (APPENDECTOMY,TONSILLECTOMY)


Hx Gall Bladder Disease: Yes (CHOLEYCYSTECTOMY)





- GENITOURINARY/GYNECOLOGICAL


Hx Genitourinary Disorders: No





- PSYCHIATRIC


Hx Psychophysiologic Disorder: Yes


Hx Anxiety: Yes


Hx Depression: Yes


Hx Substance Use: No





- SURGICAL HISTORY


Hx Appendectomy: Yes


Hx Cholecystectomy: Yes





Meds


Allergies/Adverse Reactions: 


                                    Allergies











Allergy/AdvReac Type Severity Reaction Status Date / Time


 


No Known Allergies Allergy   Verified 10/04/18 14:47














- Medications


Medications: 


                               Current Medications





Amiodarone HCl (Cordarone)  400 mg PO TID [facility]


   Stop: 10/07/18 23:59


   Last Admin: 10/05/18 21:06 Dose:  400 mg


Amiodarone HCl (Cordarone)  200 mg PO DAILY [facility]


Aspirin (Aspirin Chewable)  81 mg NG DAILY [facility]


   Last Admin: 10/05/18 19:50 Dose:  81 mg


Atorvastatin Calcium (Lipitor)  80 mg NG DIN JIM


   Last Admin: 10/05/18 18:06 Dose:  80 mg


Gemfibrozil (Lopid)  600 mg PO BID JIM


   Last Admin: 10/05/18 18:07 Dose:  600 mg


Heparin Sodium (Porcine) (Heparin)  5,000 units SC Q8 JIM; Protocol


   Last Admin: 10/05/18 21:08 Dose:  5,000 units


Dobutamine HCl/Dextrose (Dobutamine/Dextrose 5% 500mg/250ml)  500 mg in 250 mls 

@ 7.62 mls/hr IV .Q24H PRN; Protocol


   PRN Reason: TITRATE PER PROTOCOL


   Last Admin: 10/05/18 15:04 Dose:  4 mcg/kg/min, 15.241 mls/hr


Heparin Sodium/Sodium Chloride (Heparin 97135 Units/250ml 1/2 Normal Saline)  

25,000 units in 250 mls @ 15.241 mls/hr IV .U42H13M JIM; Protocol


   Last Admin: 10/04/18 11:30 Dose:  Not Given


Levetiracetam (Keppra 500mg Ivpb)  500 mg in 100 mls @ 400 mls/hr IVPB Q12 JIM


   Last Admin: 10/05/18 21:10 Dose:  400 mls/hr


Cisatracurium Besylate 200 mg/ (Sodium Chloride)  270 mls @ 4.66 mls/hr IV .Q24H

PRN; Protocol


   PRN Reason: TITRATE PER MD ORDER


   Last Titration: 10/05/18 07:00 Dose:  0.5 mcg/kg/min, 4.66 mls/hr


Acetaminophen (Ofirmev)  1,000 mg in 100 mls @ 400 mls/hr IVPB Q6H PRN


   PRN Reason: for T>100F


   Stop: 10/06/18 16:17


Doxycycline Hyclate 100 mg/ (Sodium Chloride)  100 mls @ 100 mls/hr IVPB Q12 

JIM; Protocol


   Last Admin: 10/05/18 10:45 Dose:  100 mls/hr


Metronidazole (Flagyl)  500 mg in 100 mls @ 100 mls/hr IVPB Q8 JIM; Protocol


   Last Admin: 10/05/18 21:09 Dose:  100 mls/hr


Cefepime HCl (Maxipime 2gm)  2 gm in 100 mls @ 100 mls/hr IVPB Q8 JIM; Protocol


   Stop: 10/10/18 08:01


   Last Admin: 10/05/18 21:09 Dose:  100 mls/hr


Fentanyl Citrate (Fentanyl Citrate/Sodium Chloride 1 Mg/100 Ml)  1,000 mcg in 

100 mls @ 10 mls/hr IV .Q10H PRN; Protocol


   PRN Reason: TITRATE PER MD ORDER


   Last Titration: 10/05/18 16:44 Dose:  70 mcg/hr, 7 mls/hr


Midazolam 100 mg/100ml in NS (Midazolam 100 Mg/100ml In Ns)  100 mg in 100 mls @

1 mls/hr IV .Q24H PRN; Protocol


   PRN Reason: Sedation


   Last Titration: 10/05/18 11:30 Dose:  2 mg/hr, 2 mls/hr


Vancomycin HCl (Vancomycin 1gm)  1 gm in 250 mls @ 167 mls/hr IVPB Q12H JIM; 

Protocol


Insulin Human Regular (Humulin R Med)  0 units SC Q2H JIM; Protocol


   Last Admin: 10/05/18 21:16 Dose:  Not Given


Pantoprazole Sodium (Protonix Inj)  40 mg IVP DAILY JIM


   Last Admin: 10/05/18 09:12 Dose:  40 mg











Physical Exam





- Constitutional


Appears: Other (intubated, unresponsive)





- ENT Exam


Additional comments: 





ET tube in place





- Respiratory Exam


Respiratory Exam: Decreased Breath Sounds





- Cardiovascular Exam


Cardiovascular Exam: +S1, +S2





- GI/Abdominal Exam


GI & Abdominal Exam: Soft.  absent: Tenderness





Results





- Vital Signs


Recent Vital Signs: 


                                Last Vital Signs











Temp  94.1 F L  10/05/18 18:59


 


Pulse  78   10/05/18 21:06


 


Resp  20   10/05/18 06:54


 


BP  119/70   10/05/18 21:06


 


Pulse Ox  97   10/05/18 18:59














- Labs


Result Diagrams: 


                                 10/05/18 05:30





                                 10/05/18 05:30


Labs: 


                         Laboratory Results - last 24 hr











  10/04/18 10/04/18 10/04/18





  16:50 21:21 22:40


 


WBC    15.5 H D


 


RBC    4.70


 


Hgb    14.3


 


Hct    42.5


 


MCV    90.4  D


 


MCH    30.4


 


MCHC    33.6


 


RDW    14.2


 


Plt Count    99 L


 


MPV    12.8 H


 


Gran %    87.3 H


 


Lymph % (Auto)    5.6 L


 


Mono % (Auto)    7.0 H


 


Eos % (Auto)    0.0 L


 


Baso % (Auto)    0.1


 


Gran #    13.50 H


 


Lymph # (Auto)    0.9 L


 


Mono # (Auto)    1.1 H


 


Eos # (Auto)    0.0


 


Baso # (Auto)    0.01


 


Neutrophils % (Manual)   


 


Band Neutrophils %   


 


Lymphocytes % (Manual)   


 


Monocytes % (Manual)   


 


Platelet Evaluation   


 


pCO2   


 


pO2   


 


HCO3   


 


ABG pH   


 


ABG Total CO2   


 


ABG O2 Saturation   


 


ABG O2 Content   


 


ABG Base Excess   


 


ABG Hemoglobin   


 


ABG Carboxyhemoglobin   


 


POC ABG HHb (Measured)   


 


ABG Methemoglobin   


 


ABG O2 Capacity   


 


Hgb O2 Saturation   


 


FiO2   


 


Sodium   


 


Potassium   


 


Chloride   


 


Carbon Dioxide   


 


Anion Gap   


 


BUN   


 


Creatinine   


 


Est GFR ( Amer)   


 


Est GFR (Non-Af Amer)   


 


POC Glucose (mg/dL)   135 H 


 


Random Glucose   


 


Hemoglobin A1c   


 


Calcium   


 


Phosphorus   


 


Magnesium   


 


Total Bilirubin   


 


AST   


 


ALT   


 


Alkaline Phosphatase   


 


Total Protein   


 


Albumin   


 


Globulin   


 


Albumin/Globulin Ratio   


 


Triglycerides   


 


Cholesterol   


 


LDL Cholesterol Direct   


 


HDL Cholesterol   


 


Lipase   


 


Procalcitonin  0.22  


 


Ur L.pneumophila Ag   














  10/04/18 10/05/18 10/05/18





  23:22 00:05 03:37


 


WBC   


 


RBC   


 


Hgb   


 


Hct   


 


MCV   


 


MCH   


 


MCHC   


 


RDW   


 


Plt Count   


 


MPV   


 


Gran %   


 


Lymph % (Auto)   


 


Mono % (Auto)   


 


Eos % (Auto)   


 


Baso % (Auto)   


 


Gran #   


 


Lymph # (Auto)   


 


Mono # (Auto)   


 


Eos # (Auto)   


 


Baso # (Auto)   


 


Neutrophils % (Manual)   


 


Band Neutrophils %   


 


Lymphocytes % (Manual)   


 


Monocytes % (Manual)   


 


Platelet Evaluation   


 


pCO2   


 


pO2   


 


HCO3   


 


ABG pH   


 


ABG Total CO2   


 


ABG O2 Saturation   


 


ABG O2 Content   


 


ABG Base Excess   


 


ABG Hemoglobin   


 


ABG Carboxyhemoglobin   


 


POC ABG HHb (Measured)   


 


ABG Methemoglobin   


 


ABG O2 Capacity   


 


Hgb O2 Saturation   


 


FiO2   


 


Sodium   137 


 


Potassium   3.8 


 


Chloride   97 L 


 


Carbon Dioxide   29 


 


Anion Gap   15 


 


BUN   20 


 


Creatinine   0.8 


 


Est GFR ( Amer)   > 60 


 


Est GFR (Non-Af Amer)   > 60 


 


POC Glucose (mg/dL)  150 H   132 H


 


Random Glucose   134 H 


 


Hemoglobin A1c   


 


Calcium   8.8 


 


Phosphorus   4.6 H 


 


Magnesium   1.8 


 


Total Bilirubin   


 


AST   


 


ALT   


 


Alkaline Phosphatase   


 


Total Protein   


 


Albumin   


 


Globulin   


 


Albumin/Globulin Ratio   


 


Triglycerides   


 


Cholesterol   


 


LDL Cholesterol Direct   


 


HDL Cholesterol   


 


Lipase   


 


Procalcitonin   


 


Ur L.pneumophila Ag   














  10/05/18 10/05/18 10/05/18





  05:29 05:30 05:30


 


WBC   17.9 H 


 


RBC   4.90 


 


Hgb   15.1 


 


Hct   44.7 


 


MCV   91.2 


 


MCH   30.8 


 


MCHC   33.8 


 


RDW   14.3 


 


Plt Count   180 


 


MPV   12.5 H 


 


Gran %   84.5 H 


 


Lymph % (Auto)   4.2 L 


 


Mono % (Auto)   11.2 H 


 


Eos % (Auto)   0.0 L 


 


Baso % (Auto)   0.1 


 


Gran #   15.17 H 


 


Lymph # (Auto)   0.8 L 


 


Mono # (Auto)   2.0 H 


 


Eos # (Auto)   0.0 


 


Baso # (Auto)   0.01 


 


Neutrophils % (Manual)   89 H 


 


Band Neutrophils %   3 H 


 


Lymphocytes % (Manual)   7 L 


 


Monocytes % (Manual)   1 


 


Platelet Evaluation   Normal 


 


pCO2   


 


pO2   


 


HCO3   


 


ABG pH   


 


ABG Total CO2   


 


ABG O2 Saturation   


 


ABG O2 Content   


 


ABG Base Excess   


 


ABG Hemoglobin   


 


ABG Carboxyhemoglobin   


 


POC ABG HHb (Measured)   


 


ABG Methemoglobin   


 


ABG O2 Capacity   


 


Hgb O2 Saturation   


 


FiO2   


 


Sodium    133


 


Potassium    3.8


 


Chloride    97 L


 


Carbon Dioxide    26


 


Anion Gap    15


 


BUN    18


 


Creatinine    0.7


 


Est GFR ( Amer)    > 60


 


Est GFR (Non-Af Amer)    > 60


 


POC Glucose (mg/dL)  161 H  


 


Random Glucose    134 H


 


Hemoglobin A1c   


 


Calcium    8.2 L


 


Phosphorus    3.6


 


Magnesium    1.7


 


Total Bilirubin    1.3


 


AST    60 H D


 


ALT    28


 


Alkaline Phosphatase    62


 


Total Protein    7.3


 


Albumin    3.7


 


Globulin    3.5


 


Albumin/Globulin Ratio    1.1


 


Triglycerides    1994 H


 


Cholesterol    160


 


LDL Cholesterol Direct    71


 


HDL Cholesterol    35


 


Lipase   


 


Procalcitonin   


 


Ur L.pneumophila Ag   














  10/05/18 10/05/18 10/05/18





  05:30 06:00 07:33


 


WBC   


 


RBC   


 


Hgb   


 


Hct   


 


MCV   


 


MCH   


 


MCHC   


 


RDW   


 


Plt Count   


 


MPV   


 


Gran %   


 


Lymph % (Auto)   


 


Mono % (Auto)   


 


Eos % (Auto)   


 


Baso % (Auto)   


 


Gran #   


 


Lymph # (Auto)   


 


Mono # (Auto)   


 


Eos # (Auto)   


 


Baso # (Auto)   


 


Neutrophils % (Manual)   


 


Band Neutrophils %   


 


Lymphocytes % (Manual)   


 


Monocytes % (Manual)   


 


Platelet Evaluation   


 


pCO2   29 L 


 


pO2   151.0 H 


 


HCO3   22.6 


 


ABG pH   7.50 H 


 


ABG Total CO2   23.5 


 


ABG O2 Saturation   99.7 H 


 


ABG O2 Content   21.8 


 


ABG Base Excess   0.6 


 


ABG Hemoglobin   15.8 


 


ABG Carboxyhemoglobin   1.5 


 


POC ABG HHb (Measured)   0.3 


 


ABG Methemoglobin   1.1 


 


ABG O2 Capacity   21.9 


 


Hgb O2 Saturation   97.1 


 


FiO2   80.0 


 


Sodium   


 


Potassium   


 


Chloride   


 


Carbon Dioxide   


 


Anion Gap   


 


BUN   


 


Creatinine   


 


Est GFR ( Amer)   


 


Est GFR (Non-Af Amer)   


 


POC Glucose (mg/dL)    195 H


 


Random Glucose   


 


Hemoglobin A1c  6.5  


 


Calcium   


 


Phosphorus   


 


Magnesium   


 


Total Bilirubin   


 


AST   


 


ALT   


 


Alkaline Phosphatase   


 


Total Protein   


 


Albumin   


 


Globulin   


 


Albumin/Globulin Ratio   


 


Triglycerides   


 


Cholesterol   


 


LDL Cholesterol Direct   


 


HDL Cholesterol   


 


Lipase   


 


Procalcitonin   


 


Ur L.pneumophila Ag   














  10/05/18 10/05/18 10/05/18





  10:45 11:41 12:50


 


WBC   


 


RBC   


 


Hgb   


 


Hct   


 


MCV   


 


MCH   


 


MCHC   


 


RDW   


 


Plt Count   


 


MPV   


 


Gran %   


 


Lymph % (Auto)   


 


Mono % (Auto)   


 


Eos % (Auto)   


 


Baso % (Auto)   


 


Gran #   


 


Lymph # (Auto)   


 


Mono # (Auto)   


 


Eos # (Auto)   


 


Baso # (Auto)   


 


Neutrophils % (Manual)   


 


Band Neutrophils %   


 


Lymphocytes % (Manual)   


 


Monocytes % (Manual)   


 


Platelet Evaluation   


 


pCO2   


 


pO2   


 


HCO3   


 


ABG pH   


 


ABG Total CO2   


 


ABG O2 Saturation   


 


ABG O2 Content   


 


ABG Base Excess   


 


ABG Hemoglobin   


 


ABG Carboxyhemoglobin   


 


POC ABG HHb (Measured)   


 


ABG Methemoglobin   


 


ABG O2 Capacity   


 


Hgb O2 Saturation   


 


FiO2   


 


Sodium   


 


Potassium   


 


Chloride   


 


Carbon Dioxide   


 


Anion Gap   


 


BUN   


 


Creatinine   


 


Est GFR ( Amer)   


 


Est GFR (Non-Af Amer)   


 


POC Glucose (mg/dL)   214 H 


 


Random Glucose   


 


Hemoglobin A1c   


 


Calcium   


 


Phosphorus   


 


Magnesium   


 


Total Bilirubin   


 


AST   


 


ALT   


 


Alkaline Phosphatase   


 


Total Protein   


 


Albumin   


 


Globulin   


 


Albumin/Globulin Ratio   


 


Triglycerides   


 


Cholesterol   


 


LDL Cholesterol Direct   


 


HDL Cholesterol   


 


Lipase  89  


 


Procalcitonin   


 


Ur L.pneumophila Ag    Negative














  10/05/18 10/05/18 10/05/18





  13:16 15:58 18:00


 


WBC   


 


RBC   


 


Hgb   


 


Hct   


 


MCV   


 


MCH   


 


MCHC   


 


RDW   


 


Plt Count   


 


MPV   


 


Gran %   


 


Lymph % (Auto)   


 


Mono % (Auto)   


 


Eos % (Auto)   


 


Baso % (Auto)   


 


Gran #   


 


Lymph # (Auto)   


 


Mono # (Auto)   


 


Eos # (Auto)   


 


Baso # (Auto)   


 


Neutrophils % (Manual)   


 


Band Neutrophils %   


 


Lymphocytes % (Manual)   


 


Monocytes % (Manual)   


 


Platelet Evaluation   


 


pCO2   


 


pO2   


 


HCO3   


 


ABG pH   


 


ABG Total CO2   


 


ABG O2 Saturation   


 


ABG O2 Content   


 


ABG Base Excess   


 


ABG Hemoglobin   


 


ABG Carboxyhemoglobin   


 


POC ABG HHb (Measured)   


 


ABG Methemoglobin   


 


ABG O2 Capacity   


 


Hgb O2 Saturation   


 


FiO2   


 


Sodium   


 


Potassium   


 


Chloride   


 


Carbon Dioxide   


 


Anion Gap   


 


BUN   


 


Creatinine   


 


Est GFR ( Amer)   


 


Est GFR (Non-Af Amer)   


 


POC Glucose (mg/dL)  177 H  160 H  140 H


 


Random Glucose   


 


Hemoglobin A1c   


 


Calcium   


 


Phosphorus   


 


Magnesium   


 


Total Bilirubin   


 


AST   


 


ALT   


 


Alkaline Phosphatase   


 


Total Protein   


 


Albumin   


 


Globulin   


 


Albumin/Globulin Ratio   


 


Triglycerides   


 


Cholesterol   


 


LDL Cholesterol Direct   


 


HDL Cholesterol   


 


Lipase   


 


Procalcitonin   


 


Ur L.pneumophila Ag   














  10/05/18 10/05/18





  19:57 21:14


 


WBC  


 


RBC  


 


Hgb  


 


Hct  


 


MCV  


 


MCH  


 


MCHC  


 


RDW  


 


Plt Count  


 


MPV  


 


Gran %  


 


Lymph % (Auto)  


 


Mono % (Auto)  


 


Eos % (Auto)  


 


Baso % (Auto)  


 


Gran #  


 


Lymph # (Auto)  


 


Mono # (Auto)  


 


Eos # (Auto)  


 


Baso # (Auto)  


 


Neutrophils % (Manual)  


 


Band Neutrophils %  


 


Lymphocytes % (Manual)  


 


Monocytes % (Manual)  


 


Platelet Evaluation  


 


pCO2  


 


pO2  


 


HCO3  


 


ABG pH  


 


ABG Total CO2  


 


ABG O2 Saturation  


 


ABG O2 Content  


 


ABG Base Excess  


 


ABG Hemoglobin  


 


ABG Carboxyhemoglobin  


 


POC ABG HHb (Measured)  


 


ABG Methemoglobin  


 


ABG O2 Capacity  


 


Hgb O2 Saturation  


 


FiO2  


 


Sodium  


 


Potassium  


 


Chloride  


 


Carbon Dioxide  


 


Anion Gap  


 


BUN  


 


Creatinine  


 


Est GFR ( Amer)  


 


Est GFR (Non-Af Amer)  


 


POC Glucose (mg/dL)  125 H  107


 


Random Glucose  


 


Hemoglobin A1c  


 


Calcium  


 


Phosphorus  


 


Magnesium  


 


Total Bilirubin  


 


AST  


 


ALT  


 


Alkaline Phosphatase  


 


Total Protein  


 


Albumin  


 


Globulin  


 


Albumin/Globulin Ratio  


 


Triglycerides  


 


Cholesterol  


 


LDL Cholesterol Direct  


 


HDL Cholesterol  


 


Lipase  


 


Procalcitonin  


 


Ur L.pneumophila Ag  














Assessment & Plan





- Assessment and Plan (Free Text)


Plan: 





Assessment


Systemic inflammatory response syndrome with VDRF due to cardiopulmonary arrest 

R/O acute coronary syndrome, with probable anoxic encephalopathy, R/O severe 

sepsis from aspiration pneumonia


chronic CHF


HTN


DM


COPD


atrial fibrillation


morbid obesity with BMI 40





Plan


Started Vancomycin, Cefepime, Flagyl ,Doxycycline pending blood, urine , sputum 

cx, PCT; reviewed CT A/P and CXR


overall prognosis is poor


follow up further recommendations of GI, Neuro, Cardio

## 2018-10-05 NOTE — CARD
--------------- APPROVED REPORT --------------





Date of service: 10/05/2018



EKG Measurement

Heart [id number]

NV [id number]

[id number]

[id number]

[id number]



<Conclusion>

Sinus tachycardia 

Left axis deviation

Left ventricular hypertrophy with repolarization abnormality

Abnormal ECG

## 2018-10-05 NOTE — RAD
Date of service: 



10/05/2018



HISTORY:

 pulmonary edema 



COMPARISON:

Earlier same day 



FINDINGS:



LUNGS:

There is no significant change in the pattern of perihilar pulmonary 

edema.



PLEURA:

No significant pleural effusion identified, no pneumothorax apparent.



CARDIOVASCULAR:

Cardiomegaly and vascular congestion



OSSEOUS STRUCTURES:

No significant abnormalities.



VISUALIZED UPPER ABDOMEN:

Normal.



OTHER FINDINGS:

Endotracheal and nasogastric tubes in satisfactory position



IMPRESSION:

No significant change in the pattern of pulmonary edema

## 2018-10-05 NOTE — CON
DATE:  10/04/2018



SERVICE:  Cardiology.



REASON FOR CONSULTATION:  Elevation for code STEMI.



BRIEF CLINICAL HISTORY:  This is a 58-year-old female with past medical

history significant for congestive heart failure, possibly hypertension,

diabetes, COPD, history of paroxysmal atrial fibrillation who presented to

the hospital status post cardiac arrest, intubated, brought here. 

According to ER physician, initial EKG looks like one aftershock with ST

elevation.  The patient was resuscitated for approximately, JOSE ALEJANDRO was in 4 to

10 minutes or may be less as per ER physician.  I was called to evaluate

for code STEMI and take to the cath lab.  The patient was intubated.  No

further information obtained.  Only the information obtained from the chart

or from the daughter who was not much aware of the details of the patient. 

She does not know who is the primary care physician and know what the

current medications at that time.



PAST MEDICAL HISTORY:  Significant for hypertension, diabetes, possibly

COPD, and CHF.  On previous consultation, the patient was seen by Dr. Friedman.  At that time, they found that the patient has a history of CHF,

history of hypertension, history of morbid obesity.  The patient is being

followed by [facility] cardiologist and cath was advised as

per daughter, but the patient refused.  Previous electronic record shows

that the patient has a history of diabetes, hypertension, hypothyroidism,

chronic systolic dysfunction, heart failure.



SOCIAL HISTORY:  Denies any smoking.  Denies any history of alcohol abuse.



PAST SURGICAL HISTORY:  Significant for , appendectomy,

tonsillectomy.



ALLERGIES:  NO KNOWN DRUG ALLERGY.



CURRENT MEDICATIONS:  The patient was taking Ambien, Effexor, valsartan,

Diovan, spironolactone, Lasix, digoxin, carvedilol, and aspirin.



PREVIOUS CARDIAC WORKUP:  The patient had echocardiography, 2018 that

revealed moderately dilated left ventricle, ejection fraction 20%, severe

global hypokinesis, moderate aortic regurgitation, mild mitral

regurgitation, mild tricuspid regurgitation, moderate pulmonary

hypertension.



REVIEW OF SYSTEMS:  As per HPI.



PHYSICAL EXAMINATION

VITAL SIGNS:  As follows:  Height of the patient 5 feet 6 inches, weight of

the patient 253 pounds, body mass index 40.9 kg per sq m.  Rest of the

vitals:  Temperature afebrile, heart rate 74, blood pressure 150/90.

HEENT:  PERRLA.  Extraocular muscles intact.

NECK:  Supple.  No carotid bruit or thyromegaly.

CHEST:  Clear to auscultation.

HEART:  S1 and S2 regular.

ABDOMEN:  Soft.

EXTREMITIES:  Clubbing and cyanosis negative.



LABORATORY DATA:  WBC 11.6, hemoglobin 15.3, hematocrit 47.7, platelet

count 213.  Chemistry shows sodium 139, potassium 3.9, chloride 99, carbon

dioxide 24, anion gap of 19, BUN 21, creatinine 0.9.  BNP 5280.  Troponin

0.01.  EKG showed normal sinus, left atrial enlargement, left axis

deviation.  No acute ST-T changes noted.



IMPRESSION:  A 58-year-old female with possibly history of diabetes,

hypertension, hyperlipidemia, cardiomyopathy, admitted after collapse,

initial transmitted EKG according to ER physician, looks like ST elevation,

who was revived and now the patient is JOSE ALEJANDRO, awaiting to go CAT scan. 

Discussed with the family, agreed.  We will proceed for cardiac

catheterization with code ST-elevation myocardial infarction, but we will

get a CAT scan of the chest and CAT scan of the head.  Discussed with

intensivist, Dr. Gaona.  Agreed to do the CAT scan before we take to the

cath lab.  Should the patient with intracerebral bleed, we will not give

anticoagulation if needed during the catheterization and angioplasty. 

Further management depending upon the cardiac catheterization and after the

CAT scan of the head and chest will done.  Overall, the patient's condition

is critical, prognosis is extremely guarded.  We will follow with you.



Thank you, Dr. Prajapati, for providing us the opportunity in taking care of

the patient, Sunshine Gonzalez.





__________________________________________

Booker Peck MD



DD:  10/04/2018 15:41:04

DT:  10/04/2018 17:17:45

Job # [id number]

## 2018-10-05 NOTE — CP.PCM.PN
<Maggie Garza - Last Filed: 10/05/18 15:31>





Subjective





- Date & Time of Evaluation


Date of Evaluation: 10/05/18


Time of Evaluation: 15:31





- Subjective


Subjective: 





Maggie Garza, PGY2, Neurology Progress Note for Dr Jimenez:





Patient seen and examined at bedside. No acute events overnight. Patient remains

intubated, unresponsive, on hypothermia protocol, on Versed drip. Propofol drip 

discontinued this AM. Does not open eyes spontaneously or with stimulation, does

not move extremities.





ROS limited as patient is intubated.





Objective





- Vital Signs/Intake and Output


Vital Signs (last 24 hours): 


                                        











Temp Pulse Resp BP Pulse Ox


 


 94.1 F L  70   20   131/73   98 


 


 10/05/18 14:29  10/05/18 15:04  10/05/18 06:54  10/05/18 15:04  10/05/18 14:29








Intake and Output: 


                                        











 10/05/18 10/05/18





 06:59 18:59


 


Intake Total 575 332


 


Output Total 2100 


 


Balance -1525 332














- Medications


Medications: 


                               Current Medications





Amiodarone HCl (Cordarone)  400 mg PO TID [facility]


   Stop: 10/07/18 23:59


   Last Admin: 10/05/18 15:00 Dose:  400 mg


Amiodarone HCl (Cordarone)  200 mg PO DAILY [facility]


Aspirin (Aspirin Chewable)  81 mg NG DAILY [facility]


   Last Admin: 10/05/18 10:56 Dose:  81 mg


Atorvastatin Calcium (Lipitor)  80 mg NG DIN [facility]


Gemfibrozil (Lopid)  600 mg PO BID [facility]


   Last Admin: 10/05/18 10:59 Dose:  600 mg


Heparin Sodium (Porcine) (Heparin)  5,000 units SC Q8 JIM; Protocol


   Last Admin: 10/05/18 14:22 Dose:  5,000 units


Dobutamine HCl/Dextrose (Dobutamine/Dextrose 5% 500mg/250ml)  500 mg in 250 mls 

@ 7.62 mls/hr IV .Q24H PRN; Protocol


   PRN Reason: TITRATE PER PROTOCOL


   Last Admin: 10/05/18 15:04 Dose:  4 mcg/kg/min, 15.241 mls/hr


Heparin Sodium/Sodium Chloride (Heparin 57060 Units/250ml 1/2 Normal Saline)  

25,000 units in 250 mls @ 15.241 mls/hr IV .[id number] JIM; Protocol


   Last Admin: 10/04/18 11:30 Dose:  Not Given


Levetiracetam (Keppra 500mg Ivpb)  500 mg in 100 mls @ 400 mls/hr IVPB Q12 [facility]


   Last Admin: 10/05/18 09:12 Dose:  400 mls/hr


Cisatracurium Besylate 200 mg/ (Sodium Chloride)  270 mls @ 4.66 mls/hr IV .Q24H

PRN; Protocol


   PRN Reason: TITRATE PER MD ORDER


   Last Titration: 10/05/18 07:00 Dose:  0.5 mcg/kg/min, 4.66 mls/hr


Acetaminophen (Ofirmev)  1,000 mg in 100 mls @ 400 mls/hr IVPB Q6H PRN


   PRN Reason: for T>100F


   Stop: 10/06/18 16:17


Doxycycline Hyclate 100 mg/ (Sodium Chloride)  100 mls @ 100 mls/hr IVPB Q12 

JIM; Protocol


   Last Admin: 10/05/18 10:45 Dose:  100 mls/hr


Metronidazole (Flagyl)  500 mg in 100 mls @ 100 mls/hr IVPB Q8 JIM; Protocol


   Last Admin: 10/05/18 14:22 Dose:  100 mls/hr


Amiodarone HCl/Dextrose (Nexterone 360 Mg In D5w 200 Ml (Premix))  360 mg in 200

mls @ 16.667 mls/hr IV .Q12H JIM; Protocol


   Last Admin: 10/04/18 21:53 Dose:  16.667 mls/hr


Cefepime HCl (Maxipime 2gm)  2 gm in 100 mls @ 100 mls/hr IVPB Q8 JIM; Protocol


   Stop: 10/10/18 08:01


   Last Admin: 10/05/18 14:22 Dose:  100 mls/hr


Fentanyl Citrate (Fentanyl Citrate/Sodium Chloride 1 Mg/100 Ml)  1,000 mcg in 

100 mls @ 10 mls/hr IV .Q10H PRN; Protocol


   PRN Reason: TITRATE PER MD ORDER


   Last Titration: 10/05/18 14:40 Dose:  60 mcg/hr, 6 mls/hr


Midazolam 100 mg/100ml in NS (Midazolam 100 Mg/100ml In Ns)  100 mg in 100 mls @

1 mls/hr IV .Q24H PRN; Protocol


   PRN Reason: Sedation


   Last Titration: 10/05/18 11:30 Dose:  2 mg/hr, 2 mls/hr


Insulin Human Regular (Humulin R Med)  0 units SC Q2H JIM; Protocol


   Last Admin: 10/05/18 14:00 Dose:  1 unit


Pantoprazole Sodium (Protonix Inj)  40 mg IVP DAILY [facility]


   Last Admin: 10/05/18 09:12 Dose:  40 mg











- Labs


Labs: 


                                        





                                 10/05/18 05:30 





                                 10/05/18 05:30 





                                        











PT  12.7 SECONDS (9.4-12.5)  H  10/04/18  09:15    


 


INR  1.10   10/04/18  09:15    


 


APTT  27.3 Seconds (25.1-36.5)   10/04/18  09:15    














- Constitutional


Appears: Toxic





- Head Exam


Head Exam: ATRAUMATIC, NORMOCEPHALIC





- Eye Exam


Eye Exam: PERRL (sluggish reaction to light b/l).  absent: Conjunctival 

injection, Nystagmus, Scleral icterus





- ENT Exam


Additional comments: 





intubated





- Respiratory Exam


Respiratory Exam: Clear to Ausculation Bilateral





- Cardiovascular Exam


Cardiovascular Exam: RRR, +S1, +S2.  absent: Murmur





- GI/Abdominal Exam


GI & Abdominal Exam: Soft, Hypoactive Bowel Sounds.  absent: Organomegaly





- Extremities Exam


Extremities Exam: absent: Pedal Edema





- Neurological Exam


Neurological Exam: Altered


Additional comments: 





Does not respond to verbal, painful stimuli, not to sternal rub.


No corneals, gag, pupillary reaction to light, dolls eyes.





- Skin


Skin Exam: Mottled





Assessment and Plan





- Assessment and Plan (Free Text)


Assessment: 





58 year old female with PMH of systolic CHF with EF 15-20%, HTN, DM, COPD, AFib,

presents s/p cardiac arrest. Cardiac cath showed nonobstructive CAD. Neurology 

consulted for anoxic brain injury:





- Video EEG was abnormal due to the presence of severe  background 

slowing/attenuation. No seizures, not in status epilepticus. 


- will continue EEG for now.


- C/w Keppra 500 mg IV q12


- neuro checks


- C/w ICU management


- Monitor


- guarded prognosis





Case discussed with Dr Jimenez.








<Leandro Jimenez - Last Filed: 10/06/18 11:45>





Objective





- Vital Signs/Intake and Output


Vital Signs (last 24 hours): 


                                        











Temp Pulse Resp BP Pulse Ox


 


 97.9 F   100 H  40 H  107/63   91 L


 


 10/06/18 09:00  10/06/18 09:00  10/06/18 08:26  10/06/18 09:00  10/06/18 09:00








Intake and Output: 


                                        











 10/06/18 10/06/18





 06:59 18:59


 


Intake Total 1410 0


 


Output Total 2310 


 


Balance -900 0














- Medications


Medications: 


                               Current Medications





Amiodarone HCl (Cordarone)  400 mg PO TID [facility]


   Stop: 10/07/18 23:59


   Last Admin: 10/05/18 21:06 Dose:  400 mg


Amiodarone HCl (Cordarone)  200 mg PO DAILY [facility]


Aspirin (Aspirin Chewable)  81 mg NG DAILY [facility]


   Last Admin: 10/05/18 19:50 Dose:  81 mg


Atorvastatin Calcium (Lipitor)  80 mg NG DIN [facility]


   Last Admin: 10/05/18 18:06 Dose:  80 mg


Furosemide (Lasix)  40 mg IVP BID [facility]


Gemfibrozil (Lopid)  600 mg PO BID [facility]


   Last Admin: 10/05/18 18:07 Dose:  600 mg


Heparin Sodium (Porcine) (Heparin)  5,000 units SC Q8 JIM; Protocol


   Last Admin: 10/06/18 05:06 Dose:  5,000 units


Heparin Sodium/Sodium Chloride (Heparin 50541 Units/250ml 1/2 Normal Saline)  

25,000 units in 250 mls @ 15.241 mls/hr IV .[id number] JIM; Protocol


   Last Admin: 10/04/18 11:30 Dose:  Not Given


Cisatracurium Besylate 200 mg/ (Sodium Chloride)  270 mls @ 4.66 mls/hr IV .Q24H

PRN; Protocol


   PRN Reason: TITRATE PER MD ORDER


   Last Titration: 10/06/18 07:30 Dose:  0 mcg/kg/min, 0 mls/hr


Acetaminophen (Ofirmev)  1,000 mg in 100 mls @ 400 mls/hr IVPB Q6H PRN


   PRN Reason: for T>100F


   Stop: 10/06/18 16:17


Doxycycline Hyclate 100 mg/ (Sodium Chloride)  100 mls @ 100 mls/hr IVPB Q12 

JIM; Protocol


   Last Admin: 10/05/18 21:25 Dose:  100 mls/hr


Metronidazole (Flagyl)  500 mg in 100 mls @ 100 mls/hr IVPB Q8 JIM; Protocol


   Last Admin: 10/06/18 05:07 Dose:  100 mls/hr


Cefepime HCl (Maxipime 2gm)  2 gm in 100 mls @ 100 mls/hr IVPB Q8 JIM; Protocol


   Stop: 10/10/18 08:01


   Last Admin: 10/06/18 05:03 Dose:  100 mls/hr


Fentanyl Citrate (Fentanyl Citrate/Sodium Chloride 1 Mg/100 Ml)  1,000 mcg in 

100 mls @ 10 mls/hr IV .Q10H PRN; Protocol


   PRN Reason: TITRATE PER MD ORDER


   Last Titration: 10/06/18 07:30 Dose:  0 mcg/hr, 0 mls/hr


Vancomycin HCl (Vancomycin 1gm)  1 gm in 250 mls @ 167 mls/hr IVPB Q12H JIM; P

rotocol


   Last Admin: 10/05/18 21:47 Dose:  167 mls/hr


Dobutamine HCl/Dextrose (Dobutamine/Dextrose 5% 500mg/250ml)  500 mg in 250 mls 

@ 17.25 mls/hr IV .[id number] PRN; Protocol


   PRN Reason: TITRATE PER PROTOCOL


Levetiracetam (Keppra 1000mg/100ml Ns)  100 mls @ 460 mls/hr IV Q12 JIM


Midazolam 100 mg/100ml in NS (Midazolam 100 Mg/100ml In Ns)  100 mg in 100 mls @

5 mls/hr IV .Q20H PRN; Protocol


   PRN Reason: Sedation


Insulin Human Regular (Humulin R Med)  0 units SC Q2H JIM; Protocol


   Last Admin: 10/06/18 08:23 Dose:  Not Given


Magnesium Oxide (Mag-Ox)  400 mg PO BID [facility]


   Stop: 10/07/18 23:59


Pantoprazole Sodium (Protonix Inj)  40 mg IVP DAILY [facility]


   Last Admin: 10/05/18 09:12 Dose:  40 mg











- Labs


Labs: 


                                        





                                 10/06/18 06:30 





                                 10/05/18 05:30 





                                        











PT  12.7 SECONDS (9.4-12.5)  H  10/04/18  09:15    


 


INR  1.10   10/04/18  09:15    


 


APTT  27.3 Seconds (25.1-36.5)   10/04/18  09:15    














Attending/Attestation





- Attestation


I have personally seen and examined this patient.: Yes


I have fully participated in the care of the patient.: Yes


I have reviewed all pertinent clinical information, including history, physical 

exam and plan: Yes


Notes (Text): 





10/06/18 11:44


I agree with the assessment and plan.  EEG shows evidence of diffuse dysfunction

secondary to anoxic brain injury.  Very poor prognosis.

## 2018-10-05 NOTE — PCM.VEEG
Video EEG





- Procedure


Start Date: 10/04/18


Start Time: 17:50


End Date: 10/05/18


End Time: 07:10


Technical Summary: 





DATA ACQUISITION: 





This was a multichannel inpatient video-EEG, a minimum of 22 channels were uti

lized, performed in accordance with recommendations specified by the American 

Clinical Neurophysiology Society (JOSE Srinivasan et al.  ACNS Guideline 1: Minimum 

Technical Requirements for Performing Clinical Electroencephalography.  Journal 

of Clinical Neurophysiology 2016;33:303-7). The 10-20 electrode placement system

was utilized in accordance with guidelines detailed by the International 

Federation of Clinical Neurophysiology (NOLAN Plunkett et al.  The Ten-Twenty Electrode

System of the International Federation.  Recommendations for the Practice of 

Clinical Neurophysiology: Guidelines of the International Federation of Clinical

Physiology 1999; EEG Suppl. 52.).





DATA REVIEW / SPIKE DETECTION / DIGITAL ANALYSIS: 





The entire EEG was scanned and reviewed.  Synchronized audio and video recording

were reviewed at the time of each alarm and whenever an abnormality or 

suspicious activity was noted.  The entire recording was analyzed utilizing an 

automated digital spike and seizure analysis program and all automatic spike and

seizure detections were manually reviewed.  A compressed spectral array was 

displayed and reviewed alongside the raw EEG tracings.  In addition, further 

analysis of the EEG was performed when abnormalities were identified, including 

montage changes, dipole source localization, and frequency band identification. 

Video portion of the study is necessary to correlate abnormal EEG activity with 

clinical behavior. 





This study was attended 24 hours per day. 








- Interpretation


Description of the study: 





57 y/o woman s/p cardiac arrest, working diagnosis non convulsive status 

epilepticus. 


EEG Finding during wakefulness: 





On the entire study  was not discernible awake EEG background, the EEG showed 

diffuse bilateral attenuation with frequencies in the 4 to 5 Hz. 





Drowsiness was not seen.





There was intermittent bifrontal rhythmic delta slowing at 3 to 4 Hz lasting 3 

to 6 seconds mainly seen during drowsiness occasionally during wakefulness.





EEG Finding during sleep: 





Normal sleep architecture was not seen.


Interictal non-epileptiform abnormalities: 





None


Interictal epileptiform abnormalities: 





None


Ictal epileptiform abnormalities: 





None


Induction procedures: 





None





- Impression


Impression: 





IMPRESSION:   





This was an abnormal video  EEG, monitoring study, due to the presence of:


1-   Severe  background slowing/attenuation .


No seizures, not in status epilepticus. 








INTERPRETATION: 





This  findings are  in keeping with a  moderate to sever  non specific diffuse  

 disturbance of cortical activity. This is in keeping with a diffuse gray matter

dysfunction. 


The findings do not suggest a specific etiology

## 2018-10-05 NOTE — CON
DATE:  10/05/2018



INPATIENT ELECTROPHYSIOLOGY CONSULTATION



REFERRING PHYSICIAN:  Booker Peck MD.



REASON FOR EVALUATION:

1.  Bradycardia.

2.  PEA arrest.



HISTORY OF PRESENT ILLNESS:  Ms. Sunshine Gonzalez is an unfortunate

58-year-old  female with past medical history significant for

congestive heart failure, hypertension, diabetes, COPD, paroxysmal atrial

fibrillation, who initially presented to the [facility], status

post cardiac arrest.  She was intubated, brought to the Emergency

Department for further evaluation and management.  Patient was shocked and

it was felt that she did have an ST-elevation myocardial infarction. 

Patient was coded and resumption of circulation was regained in 4 to 10

minutes, at which point, the patient was taken to the cardiac

catheterization laboratory and was found to have nonobstructive coronary

artery disease.  Did have a single region in the circumflex of 60% to 70%. 

Patient remains intubated and sedated.  Patient has appeared to sustain

significant anoxic insult.  I was asked to see her in regards to her having

undergone cardiac arrest and was treated for a shockable rhythm, currently

is on amiodarone IV.



CURRENT MEDICATIONS:  Include amiodarone 400 mg p.o. t.i.d., which has been

initiated through the OG tube, aspirin 81 mg p.o. daily, Lipitor 40 mg p.o.

daily, gemfibrozil 600 mg b.i.d., dobutamine has been given and was at

baseline bradycardia with heart rate in the 40's.  Patient was also given

levetiracetam/Keppra 500 mg p.o. daily.



LABORATORY DATA:  On review of in regard to lab work, patient has a white

count of 17.9, H and H of 15.1 and 44.7, platelets of 188.  Potassium is

3.8.  BUN and creatinine are 18 and 0.7, glucose of 154.  INR is 1.1.



PHYSICAL EXAMINATION:

GENERAL:  Patient is intubated, sedated, in no acute distress.  Examination

is limited secondary to patient undergone EEG.  Patient is again intubated,

sedated.  NG tube is in.

CARDIOVASCULAR:  Regular rate and rhythm.  S1, S2.  No S3 or S4.

ABDOMEN:  Soft, nontender, nondistended.  Positive bowel sounds.

EXTREMITIES:  No cyanosis, clubbing, or edema.



Patient during catheterization was found to have a diminished ejection

fraction of around 15% to 20%.  EKG, which was done late last night, did

show at that time sinus tachycardia, left axis deviation.  Left ventricular

hypertrophy is noted.  QT, QTc appear to be somewhat prolonged 358 and 504

respectively.



Echocardiogram which was done on 04/18/2018 at 11:22 a.m. shows left

ventricle which is severely dilated, mild concentric left ventricular

hypertrophy, systolic function is severely impaired.  There is global

kinesis of left ventricle with transmitral flow pattern, is consistent with

grade 1 dysfunction.  Right ventricle appears mildly dilated.  Left atrium

is really dilated.  Aortic valve is mildly thickened with moderate aortic

regurgitation.  No aortic valvular stenosis.



ASSESSMENT AND PLAN:

1.  Patient is currently bradycardic.  May require ionotropic support with

IV dobutamine.  At this point, patient is now requiring chronotropic

support in the form of a permanent pacemaker.  Given patient's poor

prognosis with what appears to have anoxic injury, patient is likely not a

candidate for long-term management with a defibrillator unless there is

significant neurologic improvement.  We will continue to watch her

carefully.  This patient is requiring aggressive clinical care should be

made in the next coming 24 to 48 hours.

2.  Pulseless electrical activity arrest is of unclear reasons.  Patient

may have had a pneumonia causing hypoxia, worsening of her clinical status.

Supportive measures are currently being employed.



I have discussed the case with Dr. Peck.  He will contact me if there is

any further developments.  We will continue amiodarone currently at its

current dosing.  We will continue watching her EKG carefully.  If there is

significant QT prolongation or evidence of amiodarone proarrhythmia,

amiodarone can and should be stopped.



Thank you for allowing me to participate in the care of this patient in the

ICU.  Please do not hesitate to call if you have any questions in regards

to her care.







__________________________________________

Greg Pryor MD



DD:  10/05/2018 20:59:13

DT:  10/05/2018 22:41:49

Job # [id number]

## 2018-10-05 NOTE — RAD
Date of service: 



10/04/2018



HISTORY:

 hypoxemia 



COMPARISON:

Earlier same day 



FINDINGS:



LUNGS:

Left upper lobe infiltrate unchanged.



PLEURA:

No significant pleural effusion identified, no pneumothorax apparent.



CARDIOVASCULAR:

Severe cardiomegaly



OSSEOUS STRUCTURES:

No significant abnormalities.



VISUALIZED UPPER ABDOMEN:

Normal.



OTHER FINDINGS:

Endotracheal and nasogastric tubes in satisfactory position



IMPRESSION:

No change in left upper lobe infiltrate

## 2018-10-06 LAB
ALBUMIN SERPL-MCNC: 3.1 G/DL (ref 3–4.8)
ALBUMIN/GLOB SERPL: 1.1 {RATIO} (ref 1.1–1.8)
ALT SERPL-CCNC: 35 U/L (ref 7–56)
ARTERIAL BLOOD GAS HEMOGLOBIN: 14.2 G/DL (ref 11.7–17.4)
ARTERIAL BLOOD GAS O2 SAT: 95.3 % (ref 95–98)
ARTERIAL BLOOD GAS PCO2: 40 MM/HG (ref 35–45)
ARTERIAL BLOOD GAS TCO2: 26.6 MMOL.L (ref 22–28)
AST SERPL-CCNC: 47 U/L (ref 14–36)
BASOPHILS # BLD AUTO: 0 K/MM3 (ref 0–2)
BASOPHILS NFR BLD: 0 % (ref 0–3)
BUN SERPL-MCNC: 22 MG/DL (ref 7–21)
CALCIUM SERPL-MCNC: 8.2 MG/DL (ref 8.4–10.5)
EOSINOPHIL # BLD: 0 10*3/UL (ref 0–0.7)
EOSINOPHIL NFR BLD: 0.1 % (ref 1.5–5)
ERYTHROCYTE [DISTWIDTH] IN BLOOD BY AUTOMATED COUNT: 15 % (ref 11.5–14.5)
GFR NON-AFRICAN AMERICAN: 57
GRANULOCYTES # BLD: 12.79 10*3/UL (ref 1.4–6.5)
GRANULOCYTES NFR BLD: 89.2 % (ref 50–68)
HCO3 BLDA-SCNC: 25.4 MMOL/L (ref 21–28)
HGB BLD-MCNC: 13.2 G/DL (ref 12–16)
INHALED O2 CONCENTRATION: 50 %
LYMPHOCYTES # BLD: 0.9 10*3/UL (ref 1.2–3.4)
LYMPHOCYTES NFR BLD AUTO: 6.3 % (ref 22–35)
MCH RBC QN AUTO: 29.9 PG (ref 25–35)
MCHC RBC AUTO-ENTMCNC: 32.8 G/DL (ref 31–37)
MCV RBC AUTO: 91.2 FL (ref 80–105)
MONOCYTES # BLD AUTO: 0.6 10*3/UL (ref 0.1–0.6)
MONOCYTES NFR BLD: 4.4 % (ref 1–6)
O2 CAP BLDA-SCNC: 19.5 ML/DL (ref 16–24)
O2 CT BLDA-SCNC: 18.6 ML/DL (ref 15–23)
PH BLDA: 7.41 [PH] (ref 7.35–7.45)
PLATELET # BLD: 168 10^3/UL (ref 120–450)
PMV BLD AUTO: 12.6 FL (ref 7–11)
PO2 BLDA: 66 MM/HG (ref 80–100)
RBC # BLD AUTO: 4.42 10^6/UL (ref 3.5–6.1)
WBC # BLD AUTO: 14.3 10^3/UL (ref 4.5–11)

## 2018-10-06 RX ADMIN — DOBUTAMINE HYDROCHLORIDE PRN MLS/HR: 200 INJECTION INTRAVENOUS at 11:28

## 2018-10-06 RX ADMIN — METRONIDAZOLE SCH MLS/HR: 500 INJECTION, SOLUTION INTRAVENOUS at 05:07

## 2018-10-06 RX ADMIN — CEFEPIME SCH MLS/HR: 1 INJECTION, SOLUTION INTRAVENOUS at 17:39

## 2018-10-06 RX ADMIN — INSULIN HUMAN SCH: 100 INJECTION, SOLUTION PARENTERAL at 08:23

## 2018-10-06 RX ADMIN — METRONIDAZOLE SCH MLS/HR: 500 INJECTION, SOLUTION INTRAVENOUS at 21:00

## 2018-10-06 RX ADMIN — INSULIN HUMAN SCH: 100 INJECTION, SOLUTION PARENTERAL at 17:38

## 2018-10-06 RX ADMIN — VANCOMYCIN HYDROCHLORIDE SCH MLS/HR: 1 INJECTION, POWDER, LYOPHILIZED, FOR SOLUTION INTRAVENOUS at 12:58

## 2018-10-06 RX ADMIN — CEFEPIME SCH MLS/HR: 1 INJECTION, SOLUTION INTRAVENOUS at 22:06

## 2018-10-06 RX ADMIN — Medication SCH MG: at 17:36

## 2018-10-06 RX ADMIN — Medication PRN MLS/HR: at 15:00

## 2018-10-06 RX ADMIN — Medication SCH MG: at 11:29

## 2018-10-06 RX ADMIN — VANCOMYCIN HYDROCHLORIDE SCH MLS/HR: 1 INJECTION, POWDER, LYOPHILIZED, FOR SOLUTION INTRAVENOUS at 22:06

## 2018-10-06 RX ADMIN — METRONIDAZOLE SCH MLS/HR: 500 INJECTION, SOLUTION INTRAVENOUS at 13:48

## 2018-10-06 RX ADMIN — CEFEPIME SCH MLS/HR: 1 INJECTION, SOLUTION INTRAVENOUS at 05:03

## 2018-10-06 RX ADMIN — LEVETIRACETAM SCH MLS/HR: 5 INJECTION INTRAVENOUS at 10:00

## 2018-10-06 RX ADMIN — INSULIN HUMAN SCH: 100 INJECTION, SOLUTION PARENTERAL at 05:02

## 2018-10-06 NOTE — CP.PCM.PN
<SalvadorMitzi victor - Last Filed: 10/06/18 11:04>





Subjective





- Date & Time of Evaluation


Date of Evaluation: 10/06/18


Time of Evaluation: 10:30





- Subjective


Subjective: 





PGY-1 Medicine Progress Note for Dr. Prajapati's service





Patient seen and examined at bedside. Patient intubated and sedated. 12 point 

ROS limited. Likely poor prognosis however too early to tell.





Objective





- Vital Signs/Intake and Output


Vital Signs (last 24 hours): 


                                        











Temp Pulse Resp BP Pulse Ox


 


 97.9 F   100 H  40 H  107/63   91 L


 


 10/06/18 09:00  10/06/18 09:00  10/06/18 08:26  10/06/18 09:00  10/06/18 09:00








Intake and Output: 


                                        











 10/06/18 10/06/18





 06:59 18:59


 


Intake Total 1410 0


 


Output Total 2310 


 


Balance -900 0














- Medications


Medications: 


                               Current Medications





Amiodarone HCl (Cordarone)  400 mg PO TID [facility]


   Stop: 10/07/18 23:59


   Last Admin: 10/05/18 21:06 Dose:  400 mg


Amiodarone HCl (Cordarone)  200 mg PO DAILY [facility]


Aspirin (Aspirin Chewable)  81 mg NG DAILY [facility]


   Last Admin: 10/05/18 19:50 Dose:  81 mg


Atorvastatin Calcium (Lipitor)  80 mg NG DIN [facility]


   Last Admin: 10/05/18 18:06 Dose:  80 mg


Furosemide (Lasix)  40 mg IVP BID [facility]


Gemfibrozil (Lopid)  600 mg PO BID [facility]


   Last Admin: 10/05/18 18:07 Dose:  600 mg


Heparin Sodium (Porcine) (Heparin)  5,000 units SC Q8 [facility]; Protocol


   Last Admin: 10/06/18 05:06 Dose:  5,000 units


Heparin Sodium/Sodium Chloride (Heparin 65111 Units/250ml 1/2 Normal Saline)  

25,000 units in 250 mls @ 15.241 mls/hr IV .[id number] [facility]; Protocol


   Last Admin: 10/04/18 11:30 Dose:  Not Given


Levetiracetam (Keppra 500mg Ivpb)  500 mg in 100 mls @ 400 mls/hr IVPB Q12 [facility]


   Last Admin: 10/05/18 21:10 Dose:  400 mls/hr


Cisatracurium Besylate 200 mg/ (Sodium Chloride)  270 mls @ 4.66 mls/hr IV .Q24H

PRN; Protocol


   PRN Reason: TITRATE PER MD ORDER


   Last Titration: 10/06/18 07:30 Dose:  0 mcg/kg/min, 0 mls/hr


Acetaminophen (Ofirmev)  1,000 mg in 100 mls @ 400 mls/hr IVPB Q6H PRN


   PRN Reason: for T>100F


   Stop: 10/06/18 16:17


Doxycycline Hyclate 100 mg/ (Sodium Chloride)  100 mls @ 100 mls/hr IVPB Q12 

[facility]; Protocol


   Last Admin: 10/05/18 21:25 Dose:  100 mls/hr


Metronidazole (Flagyl)  500 mg in 100 mls @ 100 mls/hr IVPB Q8 [facility]; Protocol


   Last Admin: 10/06/18 05:07 Dose:  100 mls/hr


Cefepime HCl (Maxipime 2gm)  2 gm in 100 mls @ 100 mls/hr IVPB Q8 [facility]; Protocol


   Stop: 10/10/18 08:01


   Last Admin: 10/06/18 05:03 Dose:  100 mls/hr


Fentanyl Citrate (Fentanyl Citrate/Sodium Chloride 1 Mg/100 Ml)  1,000 mcg in 

100 mls @ 10 mls/hr IV .Q10H [facility]; Protocol


   PRN Reason: TITRATE PER MD ORDER


   Last Titration: 10/06/18 07:30 Dose:  0 mcg/hr, 0 mls/hr


Midazolam 100 mg/100ml in NS (Midazolam 100 Mg/100ml In Ns)  100 mg in 100 mls @

1 mls/hr IV .Q24H [facility]; Protocol


   PRN Reason: Sedation


   Last Titration: 10/06/18 07:30 Dose:  0 mg/hr, 0 mls/hr


Vancomycin HCl (Vancomycin 1gm)  1 gm in 250 mls @ 167 mls/hr IVPB Q12H [facility]; 

Protocol


   Last Admin: 10/05/18 21:47 Dose:  167 mls/hr


Dobutamine HCl/Dextrose (Dobutamine/Dextrose 5% 500mg/250ml)  500 mg in 250 mls 

@ 17.25 mls/hr IV .[id number] PRN; Protocol


   PRN Reason: TITRATE PER PROTOCOL


Insulin Human Regular (Humulin R Med)  0 units SC Q2H [facility]; Protocol


   Last Admin: 10/06/18 08:23 Dose:  Not Given


Magnesium Oxide (Mag-Ox)  400 mg PO BID [facility]


   Stop: 10/07/18 23:59


Pantoprazole Sodium (Protonix Inj)  40 mg IVP DAILY [facility]


   Last Admin: 10/05/18 09:12 Dose:  40 mg











- Labs


Labs: 


                                        





                                 10/06/18 06:30 





                                 10/05/18 05:30 





                                        











PT  12.7 SECONDS (9.4-12.5)  H  10/04/18  09:15    


 


INR  1.10   10/04/18  09:15    


 


APTT  27.3 Seconds (25.1-36.5)   10/04/18  09:15    














- Constitutional


Appears: Non-toxic, No Acute Distress





- Head Exam


Head Exam: NORMAL INSPECTION, NORMOCEPHALIC





- Eye Exam


Eye Exam: EOMI, Normal appearance.  absent: Nystagmus, Scleral icterus





- ENT Exam


Additional comments: 





W/ ET tube or NG tube





- Respiratory Exam


Respiratory Exam: Clear to Ausculation Bilateral, NORMAL BREATHING PATTERN.  

absent: Rhonchi, Wheezes, Respiratory Distress





- Cardiovascular Exam


Cardiovascular Exam: REGULAR RHYTHM, +S1, +S2





- GI/Abdominal Exam


GI & Abdominal Exam: Soft, Normal Bowel Sounds.  absent: Distended, Firm, 

Guarding, Tenderness


Additional comments: 





serra cather in place





- Extremities Exam


Extremities Exam: Normal Inspection.  absent: Calf Tenderness, Pedal Edema





- Neurological Exam


Neurological Exam: absent: Alert, Awake





- Psychiatric Exam


Psychiatric exam: Normal Affect, Normal Mood





- Skin


Skin Exam: Intact, Normal Color





Assessment and Plan





- Assessment and Plan (Free Text)


Assessment: 





Patient is a 59 yo female with PMH of systolic CHF, HTN, DM, COPD, AFib who 

presents to hospital s/p cardiac arrest. Patient currently intubated/sedated. 

Cardiac cath showed no thrombus, mild nonobstructive CAD only in mid circumflex 

60-70%; EF 15-20%; CT chest showed aspiration pneumonia; PNA studies negative 

currently


Plan: 





Cardiac Arrest


Unknown etiology


Cardio Consult- Dr. Peck- Cardiac cath performed- showing mild CAD and EF of 10-

15%


Neuro Consult- Dr. Man- Video EEG inconclusive study; anoxic brain injury vs 

siezures (less likely) 


ICU consult- Dr. Danielson- Keppra 500mg for seizure ppx


EKG- sinus tachycardia with T wave inversions


CT head no acute findings


Patient intubated with ET tube; NG tube; Propofol drip


Dobutamine drip; Amiodarone 400 mg po tid


Aspirin 81 NG


Venous dopplers full read pending





Aspiration Pneumonia


ID consulted- recs appreciated


Cefepime, Flagyl, Doxycycline, Vancomycin for empiric coverage


10-5-18 Cxray shows resolution of upper lobe infiltrate


PNA studies negative 


Bcx, Ucx, Sputum cx pending


Leukocytosis trending down; Repeat CMP in AM





Hypertriglycredemia


Lopoid 600mg po bid


Lipitor 80 mg NG din





HTN


Normotensive continue to monitor





DM2


ISS low dose


ACHS





Hx of COPD


Patient currently vented on Versed


Resp settings as per ICU





PPx


GI ppx- Protonix 40mg IVP daily


DVT ppx- Heparin 5000 units sc





Medical Management discussed with Dr. Prajapati


PGY-1 Mitzi Franco








<Booker Prajapati - Last Filed: 10/06/18 19:09>





Objective





- Vital Signs/Intake and Output


Vital Signs (last 24 hours): 


                                        











Temp Pulse Resp BP Pulse Ox


 


 100.6 F H  107 H  20   98/48 L  96 


 


 10/06/18 18:25  10/06/18 18:22  10/06/18 17:55  10/06/18 18:22  10/06/18 17:55








Intake and Output: 


                                        











 10/06/18 10/07/18





 18:59 06:59


 


Intake Total 958 


 


Output Total 1150 


 


Balance -192 














- Medications


Medications: 


                               Current Medications





Amiodarone HCl (Cordarone)  400 mg PO TID [facility]


   Stop: 10/07/18 23:59


   Last Admin: 10/06/18 18:22 Dose:  400 mg


Amiodarone HCl (Cordarone)  200 mg PO DAILY [facility]


Aspirin (Aspirin Chewable)  81 mg NG DAILY [facility]


   Last Admin: 10/06/18 11:30 Dose:  81 mg


Atorvastatin Calcium (Lipitor)  80 mg NG DIN [facility]


   Last Admin: 10/06/18 17:34 Dose:  80 mg


Furosemide (Lasix)  40 mg IVP BID [facility]


   Last Admin: 10/06/18 17:38 Dose:  40 mg


Gemfibrozil (Lopid)  600 mg PO BID [facility]


   Last Admin: 10/06/18 17:37 Dose:  600 mg


Heparin Sodium (Porcine) (Heparin)  5,000 units SC Q8 [facility]; Protocol


   Last Admin: 10/06/18 13:48 Dose:  5,000 units


Heparin Sodium/Sodium Chloride (Heparin 01875 Units/250ml 1/2 Normal Saline)  

25,000 units in 250 mls @ 15.241 mls/hr IV .[id number] [facility]; Protocol


   Last Admin: 10/04/18 11:30 Dose:  Not Given


Cisatracurium Besylate 200 mg/ (Sodium Chloride)  270 mls @ 4.66 mls/hr IV .Q24H

PRN; Protocol


   PRN Reason: TITRATE PER MD ORDER


   Last Titration: 10/06/18 07:30 Dose:  0 mcg/kg/min, 0 mls/hr


Doxycycline Hyclate 100 mg/ (Sodium Chloride)  100 mls @ 100 mls/hr IVPB Q12 

[facility]; Protocol


   Last Admin: 10/06/18 12:56 Dose:  100 mls/hr


Metronidazole (Flagyl)  500 mg in 100 mls @ 100 mls/hr IVPB Q8 [facility]; Protocol


   Last Admin: 10/06/18 13:48 Dose:  100 mls/hr


Cefepime HCl (Maxipime 2gm)  2 gm in 100 mls @ 100 mls/hr IVPB Q8 [facility]; Protocol


   Stop: 10/10/18 08:01


   Last Admin: 10/06/18 17:39 Dose:  100 mls/hr


Vancomycin HCl (Vancomycin 1gm)  1 gm in 250 mls @ 167 mls/hr IVPB Q12H [facility]; 

Protocol


   Last Admin: 10/06/18 12:58 Dose:  167 mls/hr


Dobutamine HCl/Dextrose (Dobutamine/Dextrose 5% 500mg/250ml)  500 mg in 250 mls 

@ 17.25 mls/hr IV .[id number] [facility]; Protocol


   PRN Reason: TITRATE PER PROTOCOL


   Last Titration: 10/06/18 15:35 Dose:  5 mcg/kg/min, 17.25 mls/hr


Midazolam 100 mg/100ml in NS (Midazolam 100 Mg/100ml In Ns)  100 mg in 100 mls @

5 mls/hr IV .Q20H [facility]; Protocol


   PRN Reason: Sedation


   Last Admin: 10/06/18 15:00 Dose:  5 mg/hr, 5 mls/hr


Levetiracetam 1,500 mg/ Sodium (Chloride)  115 mls @ 460 mls/hr IV Q12 [facility]


Insulin Human Regular (Humulin R Med)  0 units SC Q6H [facility]; Protocol


   Last Admin: 10/06/18 17:38 Dose:  Not Given


Magnesium Oxide (Mag-Ox)  400 mg PO BID [facility]


   Stop: 10/07/18 23:59


   Last Admin: 10/06/18 17:36 Dose:  400 mg


Pantoprazole Sodium (Protonix Inj)  40 mg IVP DAILY [facility]


   Last Admin: 10/06/18 10:00 Dose:  40 mg











- Labs


Labs: 


                                        





                                 10/06/18 06:30 





                                 10/06/18 06:30 





                                        











PT  12.7 SECONDS (9.4-12.5)  H  10/04/18  09:15    


 


INR  1.10   10/04/18  09:15    


 


APTT  27.3 Seconds (25.1-36.5)   10/04/18  09:15    














Attending/Attestation





- Attestation


I have personally seen and examined this patient.: Yes


I have fully participated in the care of the patient.: Yes


I have reviewed all pertinent clinical information, including history, physical 

exam and plan: Yes


Notes (Text): 





10/06/18 19:05





Medical record note made by the resident after discussion with my direction and 

input after the patient was personally seen and examined by me. I have reviewed 

the chart and agree that the record accurately reflects by personal performance 

of the history, physical exam, data review, and medical decision-making, in the 

course for the patient. I have also personally directed the plan of care.





58 yrs old  female with PMH of systolic CHF EF 17%, HTN, DM, COPD, AFib  and 

Obesity who presents to hospital s/p cardiac arrest. Cardiac cath showed no 

thrombus, mild nonobstructive CAD only in mid circumflex 60-70%; EF 15-20%; CT 

chest showed aspiration pneumonia; PNA studies negative currently.Patient is SP 

hypothermia protocol treatment.She is on Amiodrone , dobutamine and IV lasix


She is also on broad spectrum antibiotics as per ID.





 Patient has severe cortical injury caused by anoxia, on Keppra for possible 

seizure..





Prognosis is guarded.








10/06/18 19:08

## 2018-10-06 NOTE — PCM.VEEG
Video EEG





- Procedure


Start Date: 10/05/18


Start Time: 07:20


End Date: 10/06/18


End Time: 07:05


Technical Summary: 





DATA ACQUISITION: 





This was a multichannel inpatient video-EEG, a minimum of 22 channels were uti

lized, performed in accordance with recommendations specified by the American 

Clinical Neurophysiology Society (JOSE Srinivasan et al.  ACNS Guideline 1: Minimum 

Technical Requirements for Performing Clinical Electroencephalography.  Journal 

of Clinical Neurophysiology 2016;33:303-7). The 10-20 electrode placement system

was utilized in accordance with guidelines detailed by the International 

Federation of Clinical Neurophysiology (NOLAN Plunkett et al.  The Ten-Twenty Electrode

System of the International Federation.  Recommendations for the Practice of 

Clinical Neurophysiology: Guidelines of the International Federation of Clinical

Physiology 1999; EEG Suppl. 52.).





DATA REVIEW / SPIKE DETECTION / DIGITAL ANALYSIS: 





The entire EEG was scanned and reviewed.  Synchronized audio and video recording

were reviewed at the time of each alarm and whenever an abnormality or 

suspicious activity was noted.  The entire recording was analyzed utilizing an 

automated digital spike and seizure analysis program and all automatic spike and

seizure detections were manually reviewed.  A compressed spectral array was 

displayed and reviewed alongside the raw EEG tracings.  In addition, further 

analysis of the EEG was performed when abnormalities were identified, including 

montage changes, dipole source localization, and frequency band identification. 

Video portion of the study is necessary to correlate abnormal EEG activity with 

clinical behavior. 





This study was attended 24 hours per day. 








- Interpretation


Description of the study: 





59 y/o woman s/p cardiac arrest, working diagnosis non convulsive status 

epilepticus.


EEG Finding during wakefulness: 





There was no awake architecture seen, the EEG showed a diffuse bilateral 3 to4 

Hz slowing that was non reactive to stimulation, 


In addition; there  was a burst attenuation pattern characterized by high 

amplitude burst of sharp activity with inter bursts periods of relative EEG 

attenuation. They were also runs of generalized epileptifornm paroxysmal 

discharges (GPEDs) at 2 to 3 Hz, seen in prolonged runs, at times giving the 

impression of ictal progression, especially during stimulation, however clear 

ictal activity was not seen.


EEG Finding during sleep: 





Normal sleep architecture was not seen.


Interictal non-epileptiform abnormalities: 





None


Interictal epileptiform abnormalities: 





They were very frequent runs of generalized epileptifornm paroxysmal discharges 

(GPEDs) at 2 to 3 Hz, seen in prolonged runs, at times giving the impression of 

ictal progression, especially during stimulation, however clear ictal activity 

was not seen. On 10/6 this became periodic at 1 hz. 


Ictal epileptiform abnormalities: 





none


Induction procedures: 





none





- Impression


Impression: 














IMPRESSION:   





This was an abnormal video  EEG, monitoring study, due to the presence of:


1-   Severe  background slowing/attenuation .


2-   Burst suprresion pattern.


3-   GPEds (generalzied paroxysmal epileptiform discharges) that are nearly 

continuous this morning. 











INTERPRETATION: 





This  findings are  in keeping with a  severe non specific diffuse    

disturbance of cortical activity. This is in keeping with a diffuse gray matter 

dysfunction. The findings do not suggest a specific etiology. 





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 

epilepticus.

## 2018-10-06 NOTE — CP.CCUPN
<Eliu Neely - Last Filed: 10/06/18 11:18>





CCU Subjective





- Physician Review


Subjective (Free Text): 


Eliu Neely DO, PGY-1 ICU Progress Note for Dr. Alexander


Patient is a 58 year old female with PMH of DM2, HTN, COPD, and chronic AFib who

presented to ED following witnessed syncopal episode and cardiac arrest. The 

initial rhythm was found to be PEA. Compressions were started immediately per 

EMS. After 2 minutes of compressions and single dose of epinephrine, ROSC was a

chieved. She was subsequently admitted to ICU for further management. Cooling 

protocol and continuous EEG were started in ICU. 


Overnight, continuous EEG was completed. Some seizure like movements were noted 

this AM and EEG readings were discussed with neurology.








CCU Objective





- Vital Signs / Intake & Output


Vital Signs (Last 4 hours): 


Vital Signs











  Temp Pulse Resp BP Pulse Ox


 


 10/06/18 09:00  97.9 F  100 H   107/63  91 L


 


 10/06/18 08:50  97.9 F  105 H    92 L


 


 10/06/18 08:45     99/55 L 


 


 10/06/18 08:44  97.7 F  87    89 L


 


 10/06/18 08:40  97.7 F  98 H    91 L


 


 10/06/18 08:30  97.7 F  91 H   118/51 L  93 L


 


 10/06/18 08:26  97.7 F  103 H  40 H   94 L


 


 10/06/18 08:20  97.5 F L  95 H    93 L


 


 10/06/18 08:15  97.5 F L  87   103/62  93 L


 


 10/06/18 08:10  97.5 F L  92 H    93 L


 


 10/06/18 08:02  95.9 F L  93 H   113/62  93 L


 


 10/06/18 08:00  95.5 F L  99 H   108/64  95


 


 10/06/18 07:53     101/46 L 


 


 10/06/18 07:50  97.9 F  94 H    94 L


 


 10/06/18 07:48  97.7 F  86   101/46 L  95


 


 10/06/18 07:40  97.5 F L  91 H    97


 


 10/06/18 07:30  97.0 F L  90    97


 


 10/06/18 07:20  97.5 F L  89    93 L


 


 10/06/18 07:10  97.7 F  89    93 L











Intake and Output (Last 8hrs): 


                                 Intake & Output











 10/05/18 10/06/18 10/06/18





 22:59 06:59 14:59


 


Intake Total 1069 1350 0


 


Output Total 1810 2310 


 


Balance -741 -960 0


 


Intake:   


 


  IV 1039 1350 0


 


    Left Hand 100 850 


 


    Left Wrist 400  


 


    Right Antecubital 184 348 


 


    Right Hand 283  


 


  Oral 0 0 


 


  Tube Feeding 30  


 


Output:   


 


  Gastric Amount  150 


 


    Left Nares  150 


 


  Urine 1810 1810 


 


    2-way Urethral 1810 1810 


 


  Urine/Stool Mix  350 


 


Other:   


 


  # Bowel Movements 0 0 














- Physical Exam


Head: Positive for: Atraumatic, Other (Intubated)


Pupils: Positive for: Sluggish


Conjunctiva: Positive for: Normal


Mouth: Positive for: Moist Mucous Membranes


Neck: Negative for: JVD


Respiratory/Chest: Positive for: Clear to Auscultation, Good Air Exchange.  

Negative for: Respiratory Distress, Accessory Muscle Use, Wheezes, Rales, 

Rhonchi


Cardiovascular: Positive for: Regular Rate and Rhythm, Normal S1, S2.  Negative 

for: Murmurs, Rub, Gallop


Abdomen: Negative for: Distention, Mass/Organomegaly


Upper Extremity: Positive for: NORMAL PULSES.  Negative for: Cyanosis, Edema


Lower Extremity: Positive for: Edema (2+ LE pitting edema bilaterally), NORMAL 

PULSES


Neurological: Positive for: Other (Gag and pupillary reflexes elicited, 

pupillary reflexes sluggish, no doll's eyes or corneal reflexes noted)


Skin: Positive for: Warm, Dry, Normal Color.  Negative for: Rashes





- Medications


Active Medications: 


Active Medications











Generic Name Dose Route Start Last Admin





  Trade Name Michaelq  PRN Reason Stop Dose Admin


 


Amiodarone HCl  400 mg  10/05/18 10:00  10/05/18 21:06





  Cordarone  PO  10/07/18 23:59  400 mg





  TID JIM   Administration





     





     





     





     


 


Amiodarone HCl  200 mg  10/08/18 10:00  





  Cordarone  PO   





  DAILY JIM   





     





     





     





     


 


Aspirin  81 mg  10/05/18 06:00  10/05/18 19:50





  Aspirin Chewable  NG   81 mg





  DAILY JIM   Administration





     





     





     





     


 


Atorvastatin Calcium  80 mg  10/04/18 17:00  10/05/18 18:06





  Lipitor  NG   80 mg





  DIN JIM   Administration





     





     





     





     


 


Furosemide  40 mg  10/06/18 10:00  





  Lasix  IVP   





  BID JIM   





     





     





     





     


 


Gemfibrozil  600 mg  10/05/18 10:15  10/05/18 18:07





  Lopid  PO   600 mg





  BID JIM   Administration





     





     





     





     


 


Heparin Sodium (Porcine)  5,000 units  10/04/18 20:00  10/06/18 05:06





  Heparin  SC   5,000 units





  Q8 JIM   Administration





     





     





  Protocol   





     


 


Heparin Sodium/Sodium Chloride  25,000 units in 250 mls @ 15.241 mls/hr  10/

04/18 11:30  10/04/18 11:30





  Heparin 17825 Units/250ml 1/2 Normal Saline  IV   Not Given





  .[id number] JIM   





     





     





  Protocol   





  12 UNITS/KG/HR   


 


Levetiracetam  500 mg in 100 mls @ 400 mls/hr  10/04/18 11:30  10/05/18 21:10





  Keppra 500mg Ivpb  IVPB   400 mls/hr





  Q12 JIM   Administration





     





     





     





     


 


Cisatracurium Besylate 200 mg/  270 mls @ 4.66 mls/hr  10/04/18 16:05  10/06/18 

07:30





  Sodium Chloride  IV   0 mcg/kg/min





  .Q24H PRN   0 mls/hr





  TITRATE PER MD ORDER   Titration





     





  Protocol   





  0.5 MCG/KG/MIN   


 


Acetaminophen  1,000 mg in 100 mls @ 400 mls/hr  10/04/18 16:16  





  Ofirmev  IVPB  10/06/18 16:17  





  Q6H PRN   





  for T>100F   





     





     





     


 


Doxycycline Hyclate 100 mg/  100 mls @ 100 mls/hr  10/04/18 22:00  10/05/18 21

:25





  Sodium Chloride  IVPB   100 mls/hr





  Q12 JIM   Administration





     





     





  Protocol   





     


 


Metronidazole  500 mg in 100 mls @ 100 mls/hr  10/04/18 22:00  10/06/18 05:07





  Flagyl  IVPB   100 mls/hr





  Q8 JIM   Administration





     





     





  Protocol   





     


 


Cefepime HCl  2 gm in 100 mls @ 100 mls/hr  10/05/18 08:00  10/06/18 05:03





  Maxipime 2gm  IVPB  10/10/18 08:01  100 mls/hr





  Q8 JIM   Administration





     





     





  Protocol   





     


 


Fentanyl Citrate  1,000 mcg in 100 mls @ 10 mls/hr  10/05/18 10:05  10/06/18 

07:30





  Fentanyl Citrate/Sodium Chloride 1 Mg/100 Ml  IV   0 mcg/hr





  .Q10H PRN   0 mls/hr





  TITRATE PER MD ORDER   Titration





     





  Protocol   





  100 MCG/HR   


 


Midazolam 100 mg/100ml in NS  100 mg in 100 mls @ 1 mls/hr  10/05/18 10:06  

10/06/18 07:30





  Midazolam 100 Mg/100ml In Ns  IV   0 mg/hr





  .Q24H PRN   0 mls/hr





  Sedation   Titration





     





  Protocol   





  1 MG/HR   


 


Vancomycin HCl  1 gm in 250 mls @ 167 mls/hr  10/05/18 21:30  10/05/18 21:47





  Vancomycin 1gm  IVPB   167 mls/hr





  Q12H JIM   Administration





     





     





  Protocol   





     


 


Dobutamine HCl/Dextrose  500 mg in 250 mls @ 17.25 mls/hr  10/06/18 10:47  





  Dobutamine/Dextrose 5% 500mg/250ml  IV   





  .[id number] PRN   





  TITRATE PER PROTOCOL   





     





  Protocol   





  5 MCG/KG/MIN   


 


Insulin Human Regular  0 units  10/04/18 13:00  10/06/18 08:23





  Humulin R Med  SC   Not Given





  Q2H JIM   





     





     





  Protocol   





     


 


Magnesium Oxide  400 mg  10/06/18 10:45  





  Mag-Ox  PO  10/07/18 23:59  





  BID JIM   





     





     





     





     


 


Pantoprazole Sodium  40 mg  10/04/18 11:30  10/05/18 09:12





  Protonix Inj  IVP   40 mg





  DAILY JIM   Administration





     





     





     





     














- Patient Studies


Lab Studies: 


                              Microbiology Studies











 10/04/18 23:00 Gram Stain - Final





 Trachasp 


 


 10/04/18 12:45 Blood Culture - Preliminary





 Blood-Venous    NO GROWTH AFTER 24 HOURS


 


 10/04/18 12:33 Blood Culture - Preliminary





 Blood-Venous    NO GROWTH AFTER 24 HOURS








                                   Lab Studies











  10/06/18 10/06/18 10/06/18 Range/Units





  07:39 06:30 06:00 


 


WBC   14.3 H D   (4.5-11.0)  10^3/ul


 


RBC   4.42   (3.5-6.1)  10^6/uL


 


Hgb   13.2   (12.0-16.0)  g/dL


 


Hct   40.3   (36.0-48.0)  %


 


MCV   91.2   (80.0-105.0)  fl


 


MCH   29.9   (25.0-35.0)  pg


 


MCHC   32.8   (31.0-37.0)  g/dl


 


RDW   15.0 H   (11.5-14.5)  %


 


Plt Count   168   (120.0-450.0)  10^3/uL


 


MPV   12.6 H   (7.0-11.0)  fl


 


Gran %   89.2 H   (50.0-68.0)  %


 


Lymph % (Auto)   6.3 L   (22.0-35.0)  %


 


Mono % (Auto)   4.4   (1.0-6.0)  %


 


Eos % (Auto)   0.1 L   (1.5-5.0)  %


 


Baso % (Auto)   0.0   (0.0-3.0)  %


 


Gran #   12.79 H   (1.4-6.5)  


 


Lymph # (Auto)   0.9 L   (1.2-3.4)  


 


Mono # (Auto)   0.6   (0.1-0.6)  


 


Eos # (Auto)   0.0   (0.0-0.7)  


 


Baso # (Auto)   0.00   (0.0-2.0)  K/mm3


 


pCO2    40  (35-45)  mm/Hg


 


pO2    66.0 L  ()  mm/Hg


 


HCO3    25.4  (21-28)  mmol/L


 


ABG pH    7.41  (7.35-7.45)  


 


ABG Total CO2    26.6  (22-28)  mmol.L


 


ABG O2 Saturation    95.3  (95-98)  %


 


ABG O2 Content    18.6  (15-23)  ML/dl


 


ABG Base Excess    0.7  (-2.0-3.0)  mmol/L


 


ABG Hemoglobin    14.2  (11.7-17.4)  g/dL


 


ABG Carboxyhemoglobin    1.7 H  (0.5-1.5)  %


 


POC ABG HHb (Measured)    4.6  (0-5)  %


 


ABG Methemoglobin    0.7  (0.0-3.0)  %


 


ABG O2 Capacity    19.5  (16-24)  mL/dl


 


Hgb O2 Saturation    93.1 L  (95.0-98.0)  %


 


FiO2    50.0  %


 


POC Glucose (mg/dL)  160 H    ()  mg/dL


 


Hemoglobin A1c     (4.2-6.5)  %


 


Magnesium     (1.7-2.2)  mg/dL


 


Lipase     ()  U/L


 


HIV 1&2 Ag/Ab, 4th Gen     (Nonreactive)  


 


Ur L.pneumophila Ag     (NEGATIVE)  


 


Ur Strep pneumoniae Ag     (Not Detected)  














  10/06/18 10/06/18 10/06/18 Range/Units





  04:25 02:15 00:57 


 


WBC     (4.5-11.0)  10^3/ul


 


RBC     (3.5-6.1)  10^6/uL


 


Hgb     (12.0-16.0)  g/dL


 


Hct     (36.0-48.0)  %


 


MCV     (80.0-105.0)  fl


 


MCH     (25.0-35.0)  pg


 


MCHC     (31.0-37.0)  g/dl


 


RDW     (11.5-14.5)  %


 


Plt Count     (120.0-450.0)  10^3/uL


 


MPV     (7.0-11.0)  fl


 


Gran %     (50.0-68.0)  %


 


Lymph % (Auto)     (22.0-35.0)  %


 


Mono % (Auto)     (1.0-6.0)  %


 


Eos % (Auto)     (1.5-5.0)  %


 


Baso % (Auto)     (0.0-3.0)  %


 


Gran #     (1.4-6.5)  


 


Lymph # (Auto)     (1.2-3.4)  


 


Mono # (Auto)     (0.1-0.6)  


 


Eos # (Auto)     (0.0-0.7)  


 


Baso # (Auto)     (0.0-2.0)  K/mm3


 


pCO2     (35-45)  mm/Hg


 


pO2     ()  mm/Hg


 


HCO3     (21-28)  mmol/L


 


ABG pH     (7.35-7.45)  


 


ABG Total CO2     (22-28)  mmol.L


 


ABG O2 Saturation     (95-98)  %


 


ABG O2 Content     (15-23)  ML/dl


 


ABG Base Excess     (-2.0-3.0)  mmol/L


 


ABG Hemoglobin     (11.7-17.4)  g/dL


 


ABG Carboxyhemoglobin     (0.5-1.5)  %


 


POC ABG HHb (Measured)     (0-5)  %


 


ABG Methemoglobin     (0.0-3.0)  %


 


ABG O2 Capacity     (16-24)  mL/dl


 


Hgb O2 Saturation     (95.0-98.0)  %


 


FiO2     %


 


POC Glucose (mg/dL)  131 H  107  87  ()  mg/dL


 


Hemoglobin A1c     (4.2-6.5)  %


 


Magnesium     (1.7-2.2)  mg/dL


 


Lipase     ()  U/L


 


HIV 1&2 Ag/Ab, 4th Gen     (Nonreactive)  


 


Ur L.pneumophila Ag     (NEGATIVE)  


 


Ur Strep pneumoniae Ag     (Not Detected)  














  10/06/18 10/05/18 10/05/18 Range/Units





  00:30 22:37 21:14 


 


WBC     (4.5-11.0)  10^3/ul


 


RBC     (3.5-6.1)  10^6/uL


 


Hgb     (12.0-16.0)  g/dL


 


Hct     (36.0-48.0)  %


 


MCV     (80.0-105.0)  fl


 


MCH     (25.0-35.0)  pg


 


MCHC     (31.0-37.0)  g/dl


 


RDW     (11.5-14.5)  %


 


Plt Count     (120.0-450.0)  10^3/uL


 


MPV     (7.0-11.0)  fl


 


Gran %     (50.0-68.0)  %


 


Lymph % (Auto)     (22.0-35.0)  %


 


Mono % (Auto)     (1.0-6.0)  %


 


Eos % (Auto)     (1.5-5.0)  %


 


Baso % (Auto)     (0.0-3.0)  %


 


Gran #     (1.4-6.5)  


 


Lymph # (Auto)     (1.2-3.4)  


 


Mono # (Auto)     (0.1-0.6)  


 


Eos # (Auto)     (0.0-0.7)  


 


Baso # (Auto)     (0.0-2.0)  K/mm3


 


pCO2     (35-45)  mm/Hg


 


pO2     ()  mm/Hg


 


HCO3     (21-28)  mmol/L


 


ABG pH     (7.35-7.45)  


 


ABG Total CO2     (22-28)  mmol.L


 


ABG O2 Saturation     (95-98)  %


 


ABG O2 Content     (15-23)  ML/dl


 


ABG Base Excess     (-2.0-3.0)  mmol/L


 


ABG Hemoglobin     (11.7-17.4)  g/dL


 


ABG Carboxyhemoglobin     (0.5-1.5)  %


 


POC ABG HHb (Measured)     (0-5)  %


 


ABG Methemoglobin     (0.0-3.0)  %


 


ABG O2 Capacity     (16-24)  mL/dl


 


Hgb O2 Saturation     (95.0-98.0)  %


 


FiO2     %


 


POC Glucose (mg/dL)   88  107  ()  mg/dL


 


Hemoglobin A1c     (4.2-6.5)  %


 


Magnesium  1.7    (1.7-2.2)  mg/dL


 


Lipase     ()  U/L


 


HIV 1&2 Ag/Ab, 4th Gen     (Nonreactive)  


 


Ur L.pneumophila Ag     (NEGATIVE)  


 


Ur Strep pneumoniae Ag     (Not Detected)  














  10/05/18 10/05/18 10/05/18 Range/Units





  19:57 18:00 15:58 


 


WBC     (4.5-11.0)  10^3/ul


 


RBC     (3.5-6.1)  10^6/uL


 


Hgb     (12.0-16.0)  g/dL


 


Hct     (36.0-48.0)  %


 


MCV     (80.0-105.0)  fl


 


MCH     (25.0-35.0)  pg


 


MCHC     (31.0-37.0)  g/dl


 


RDW     (11.5-14.5)  %


 


Plt Count     (120.0-450.0)  10^3/uL


 


MPV     (7.0-11.0)  fl


 


Gran %     (50.0-68.0)  %


 


Lymph % (Auto)     (22.0-35.0)  %


 


Mono % (Auto)     (1.0-6.0)  %


 


Eos % (Auto)     (1.5-5.0)  %


 


Baso % (Auto)     (0.0-3.0)  %


 


Gran #     (1.4-6.5)  


 


Lymph # (Auto)     (1.2-3.4)  


 


Mono # (Auto)     (0.1-0.6)  


 


Eos # (Auto)     (0.0-0.7)  


 


Baso # (Auto)     (0.0-2.0)  K/mm3


 


pCO2     (35-45)  mm/Hg


 


pO2     ()  mm/Hg


 


HCO3     (21-28)  mmol/L


 


ABG pH     (7.35-7.45)  


 


ABG Total CO2     (22-28)  mmol.L


 


ABG O2 Saturation     (95-98)  %


 


ABG O2 Content     (15-23)  ML/dl


 


ABG Base Excess     (-2.0-3.0)  mmol/L


 


ABG Hemoglobin     (11.7-17.4)  g/dL


 


ABG Carboxyhemoglobin     (0.5-1.5)  %


 


POC ABG HHb (Measured)     (0-5)  %


 


ABG Methemoglobin     (0.0-3.0)  %


 


ABG O2 Capacity     (16-24)  mL/dl


 


Hgb O2 Saturation     (95.0-98.0)  %


 


FiO2     %


 


POC Glucose (mg/dL)  125 H  140 H  160 H  ()  mg/dL


 


Hemoglobin A1c     (4.2-6.5)  %


 


Magnesium     (1.7-2.2)  mg/dL


 


Lipase     ()  U/L


 


HIV 1&2 Ag/Ab, 4th Gen     (Nonreactive)  


 


Ur L.pneumophila Ag     (NEGATIVE)  


 


Ur Strep pneumoniae Ag     (Not Detected)  














  10/05/18 10/05/18 10/05/18 Range/Units





  13:16 12:50 11:41 


 


WBC     (4.5-11.0)  10^3/ul


 


RBC     (3.5-6.1)  10^6/uL


 


Hgb     (12.0-16.0)  g/dL


 


Hct     (36.0-48.0)  %


 


MCV     (80.0-105.0)  fl


 


MCH     (25.0-35.0)  pg


 


MCHC     (31.0-37.0)  g/dl


 


RDW     (11.5-14.5)  %


 


Plt Count     (120.0-450.0)  10^3/uL


 


MPV     (7.0-11.0)  fl


 


Gran %     (50.0-68.0)  %


 


Lymph % (Auto)     (22.0-35.0)  %


 


Mono % (Auto)     (1.0-6.0)  %


 


Eos % (Auto)     (1.5-5.0)  %


 


Baso % (Auto)     (0.0-3.0)  %


 


Gran #     (1.4-6.5)  


 


Lymph # (Auto)     (1.2-3.4)  


 


Mono # (Auto)     (0.1-0.6)  


 


Eos # (Auto)     (0.0-0.7)  


 


Baso # (Auto)     (0.0-2.0)  K/mm3


 


pCO2     (35-45)  mm/Hg


 


pO2     ()  mm/Hg


 


HCO3     (21-28)  mmol/L


 


ABG pH     (7.35-7.45)  


 


ABG Total CO2     (22-28)  mmol.L


 


ABG O2 Saturation     (95-98)  %


 


ABG O2 Content     (15-23)  ML/dl


 


ABG Base Excess     (-2.0-3.0)  mmol/L


 


ABG Hemoglobin     (11.7-17.4)  g/dL


 


ABG Carboxyhemoglobin     (0.5-1.5)  %


 


POC ABG HHb (Measured)     (0-5)  %


 


ABG Methemoglobin     (0.0-3.0)  %


 


ABG O2 Capacity     (16-24)  mL/dl


 


Hgb O2 Saturation     (95.0-98.0)  %


 


FiO2     %


 


POC Glucose (mg/dL)  177 H   214 H  ()  mg/dL


 


Hemoglobin A1c     (4.2-6.5)  %


 


Magnesium     (1.7-2.2)  mg/dL


 


Lipase     ()  U/L


 


HIV 1&2 Ag/Ab, 4th Gen     (Nonreactive)  


 


Ur L.pneumophila Ag   Negative   (NEGATIVE)  


 


Ur Strep pneumoniae Ag     (Not Detected)  














  10/05/18 10/05/18 10/05/18 Range/Units





  10:45 05:30 05:30 


 


WBC     (4.5-11.0)  10^3/ul


 


RBC     (3.5-6.1)  10^6/uL


 


Hgb     (12.0-16.0)  g/dL


 


Hct     (36.0-48.0)  %


 


MCV     (80.0-105.0)  fl


 


MCH     (25.0-35.0)  pg


 


MCHC     (31.0-37.0)  g/dl


 


RDW     (11.5-14.5)  %


 


Plt Count     (120.0-450.0)  10^3/uL


 


MPV     (7.0-11.0)  fl


 


Gran %     (50.0-68.0)  %


 


Lymph % (Auto)     (22.0-35.0)  %


 


Mono % (Auto)     (1.0-6.0)  %


 


Eos % (Auto)     (1.5-5.0)  %


 


Baso % (Auto)     (0.0-3.0)  %


 


Gran #     (1.4-6.5)  


 


Lymph # (Auto)     (1.2-3.4)  


 


Mono # (Auto)     (0.1-0.6)  


 


Eos # (Auto)     (0.0-0.7)  


 


Baso # (Auto)     (0.0-2.0)  K/mm3


 


pCO2     (35-45)  mm/Hg


 


pO2     ()  mm/Hg


 


HCO3     (21-28)  mmol/L


 


ABG pH     (7.35-7.45)  


 


ABG Total CO2     (22-28)  mmol.L


 


ABG O2 Saturation     (95-98)  %


 


ABG O2 Content     (15-23)  ML/dl


 


ABG Base Excess     (-2.0-3.0)  mmol/L


 


ABG Hemoglobin     (11.7-17.4)  g/dL


 


ABG Carboxyhemoglobin     (0.5-1.5)  %


 


POC ABG HHb (Measured)     (0-5)  %


 


ABG Methemoglobin     (0.0-3.0)  %


 


ABG O2 Capacity     (16-24)  mL/dl


 


Hgb O2 Saturation     (95.0-98.0)  %


 


FiO2     %


 


POC Glucose (mg/dL)     ()  mg/dL


 


Hemoglobin A1c   6.5   (4.2-6.5)  %


 


Magnesium     (1.7-2.2)  mg/dL


 


Lipase  89    ()  U/L


 


HIV 1&2 Ag/Ab, 4th Gen    Nonreactive  (Nonreactive)  


 


Ur L.pneumophila Ag     (NEGATIVE)  


 


Ur Strep pneumoniae Ag     (Not Detected)  














  10/04/18 Range/Units





  13:00 


 


WBC   (4.5-11.0)  10^3/ul


 


RBC   (3.5-6.1)  10^6/uL


 


Hgb   (12.0-16.0)  g/dL


 


Hct   (36.0-48.0)  %


 


MCV   (80.0-105.0)  fl


 


MCH   (25.0-35.0)  pg


 


MCHC   (31.0-37.0)  g/dl


 


RDW   (11.5-14.5)  %


 


Plt Count   (120.0-450.0)  10^3/uL


 


MPV   (7.0-11.0)  fl


 


Gran %   (50.0-68.0)  %


 


Lymph % (Auto)   (22.0-35.0)  %


 


Mono % (Auto)   (1.0-6.0)  %


 


Eos % (Auto)   (1.5-5.0)  %


 


Baso % (Auto)   (0.0-3.0)  %


 


Gran #   (1.4-6.5)  


 


Lymph # (Auto)   (1.2-3.4)  


 


Mono # (Auto)   (0.1-0.6)  


 


Eos # (Auto)   (0.0-0.7)  


 


Baso # (Auto)   (0.0-2.0)  K/mm3


 


pCO2   (35-45)  mm/Hg


 


pO2   ()  mm/Hg


 


HCO3   (21-28)  mmol/L


 


ABG pH   (7.35-7.45)  


 


ABG Total CO2   (22-28)  mmol.L


 


ABG O2 Saturation   (95-98)  %


 


ABG O2 Content   (15-23)  ML/dl


 


ABG Base Excess   (-2.0-3.0)  mmol/L


 


ABG Hemoglobin   (11.7-17.4)  g/dL


 


ABG Carboxyhemoglobin   (0.5-1.5)  %


 


POC ABG HHb (Measured)   (0-5)  %


 


ABG Methemoglobin   (0.0-3.0)  %


 


ABG O2 Capacity   (16-24)  mL/dl


 


Hgb O2 Saturation   (95.0-98.0)  %


 


FiO2   %


 


POC Glucose (mg/dL)   ()  mg/dL


 


Hemoglobin A1c   (4.2-6.5)  %


 


Magnesium   (1.7-2.2)  mg/dL


 


Lipase   ()  U/L


 


HIV 1&2 Ag/Ab, 4th Gen   (Nonreactive)  


 


Ur L.pneumophila Ag   (NEGATIVE)  


 


Ur Strep pneumoniae Ag  Not detected  (Not Detected)  








                         Laboratory Results - last 24 hr











  10/04/18 10/05/18 10/05/18





  13:00 05:30 05:30


 


WBC   


 


RBC   


 


Hgb   


 


Hct   


 


MCV   


 


MCH   


 


MCHC   


 


RDW   


 


Plt Count   


 


MPV   


 


Gran %   


 


Lymph % (Auto)   


 


Mono % (Auto)   


 


Eos % (Auto)   


 


Baso % (Auto)   


 


Gran #   


 


Lymph # (Auto)   


 


Mono # (Auto)   


 


Eos # (Auto)   


 


Baso # (Auto)   


 


pCO2   


 


pO2   


 


HCO3   


 


ABG pH   


 


ABG Total CO2   


 


ABG O2 Saturation   


 


ABG O2 Content   


 


ABG Base Excess   


 


ABG Hemoglobin   


 


ABG Carboxyhemoglobin   


 


POC ABG HHb (Measured)   


 


ABG Methemoglobin   


 


ABG O2 Capacity   


 


Hgb O2 Saturation   


 


FiO2   


 


POC Glucose (mg/dL)   


 


Hemoglobin A1c    6.5


 


Magnesium   


 


Lipase   


 


HIV 1&2 Ag/Ab, 4th Gen   Nonreactive 


 


Ur L.pneumophila Ag   


 


Ur Strep pneumoniae Ag  Not detected  














  10/05/18 10/05/18 10/05/18





  10:45 11:41 12:50


 


WBC   


 


RBC   


 


Hgb   


 


Hct   


 


MCV   


 


MCH   


 


MCHC   


 


RDW   


 


Plt Count   


 


MPV   


 


Gran %   


 


Lymph % (Auto)   


 


Mono % (Auto)   


 


Eos % (Auto)   


 


Baso % (Auto)   


 


Gran #   


 


Lymph # (Auto)   


 


Mono # (Auto)   


 


Eos # (Auto)   


 


Baso # (Auto)   


 


pCO2   


 


pO2   


 


HCO3   


 


ABG pH   


 


ABG Total CO2   


 


ABG O2 Saturation   


 


ABG O2 Content   


 


ABG Base Excess   


 


ABG Hemoglobin   


 


ABG Carboxyhemoglobin   


 


POC ABG HHb (Measured)   


 


ABG Methemoglobin   


 


ABG O2 Capacity   


 


Hgb O2 Saturation   


 


FiO2   


 


POC Glucose (mg/dL)   214 H 


 


Hemoglobin A1c   


 


Magnesium   


 


Lipase  89  


 


HIV 1&2 Ag/Ab, 4th Gen   


 


Ur L.pneumophila Ag    Negative


 


Ur Strep pneumoniae Ag   














  10/05/18 10/05/18 10/05/18





  13:16 15:58 18:00


 


WBC   


 


RBC   


 


Hgb   


 


Hct   


 


MCV   


 


MCH   


 


MCHC   


 


RDW   


 


Plt Count   


 


MPV   


 


Gran %   


 


Lymph % (Auto)   


 


Mono % (Auto)   


 


Eos % (Auto)   


 


Baso % (Auto)   


 


Gran #   


 


Lymph # (Auto)   


 


Mono # (Auto)   


 


Eos # (Auto)   


 


Baso # (Auto)   


 


pCO2   


 


pO2   


 


HCO3   


 


ABG pH   


 


ABG Total CO2   


 


ABG O2 Saturation   


 


ABG O2 Content   


 


ABG Base Excess   


 


ABG Hemoglobin   


 


ABG Carboxyhemoglobin   


 


POC ABG HHb (Measured)   


 


ABG Methemoglobin   


 


ABG O2 Capacity   


 


Hgb O2 Saturation   


 


FiO2   


 


POC Glucose (mg/dL)  177 H  160 H  140 H


 


Hemoglobin A1c   


 


Magnesium   


 


Lipase   


 


HIV 1&2 Ag/Ab, 4th Gen   


 


Ur L.pneumophila Ag   


 


Ur Strep pneumoniae Ag   














  10/05/18 10/05/18 10/05/18





  19:57 21:14 22:37


 


WBC   


 


RBC   


 


Hgb   


 


Hct   


 


MCV   


 


MCH   


 


MCHC   


 


RDW   


 


Plt Count   


 


MPV   


 


Gran %   


 


Lymph % (Auto)   


 


Mono % (Auto)   


 


Eos % (Auto)   


 


Baso % (Auto)   


 


Gran #   


 


Lymph # (Auto)   


 


Mono # (Auto)   


 


Eos # (Auto)   


 


Baso # (Auto)   


 


pCO2   


 


pO2   


 


HCO3   


 


ABG pH   


 


ABG Total CO2   


 


ABG O2 Saturation   


 


ABG O2 Content   


 


ABG Base Excess   


 


ABG Hemoglobin   


 


ABG Carboxyhemoglobin   


 


POC ABG HHb (Measured)   


 


ABG Methemoglobin   


 


ABG O2 Capacity   


 


Hgb O2 Saturation   


 


FiO2   


 


POC Glucose (mg/dL)  125 H  107  88


 


Hemoglobin A1c   


 


Magnesium   


 


Lipase   


 


HIV 1&2 Ag/Ab, 4th Gen   


 


Ur L.pneumophila Ag   


 


Ur Strep pneumoniae Ag   














  10/06/18 10/06/18 10/06/18





  00:30 00:57 02:15


 


WBC   


 


RBC   


 


Hgb   


 


Hct   


 


MCV   


 


MCH   


 


MCHC   


 


RDW   


 


Plt Count   


 


MPV   


 


Gran %   


 


Lymph % (Auto)   


 


Mono % (Auto)   


 


Eos % (Auto)   


 


Baso % (Auto)   


 


Gran #   


 


Lymph # (Auto)   


 


Mono # (Auto)   


 


Eos # (Auto)   


 


Baso # (Auto)   


 


pCO2   


 


pO2   


 


HCO3   


 


ABG pH   


 


ABG Total CO2   


 


ABG O2 Saturation   


 


ABG O2 Content   


 


ABG Base Excess   


 


ABG Hemoglobin   


 


ABG Carboxyhemoglobin   


 


POC ABG HHb (Measured)   


 


ABG Methemoglobin   


 


ABG O2 Capacity   


 


Hgb O2 Saturation   


 


FiO2   


 


POC Glucose (mg/dL)   87  107


 


Hemoglobin A1c   


 


Magnesium  1.7  


 


Lipase   


 


HIV 1&2 Ag/Ab, 4th Gen   


 


Ur L.pneumophila Ag   


 


Ur Strep pneumoniae Ag   














  10/06/18 10/06/18 10/06/18





  04:25 06:00 06:30


 


WBC    14.3 H D


 


RBC    4.42


 


Hgb    13.2


 


Hct    40.3


 


MCV    91.2


 


MCH    29.9


 


MCHC    32.8


 


RDW    15.0 H


 


Plt Count    168


 


MPV    12.6 H


 


Gran %    89.2 H


 


Lymph % (Auto)    6.3 L


 


Mono % (Auto)    4.4


 


Eos % (Auto)    0.1 L


 


Baso % (Auto)    0.0


 


Gran #    12.79 H


 


Lymph # (Auto)    0.9 L


 


Mono # (Auto)    0.6


 


Eos # (Auto)    0.0


 


Baso # (Auto)    0.00


 


pCO2   40 


 


pO2   66.0 L 


 


HCO3   25.4 


 


ABG pH   7.41 


 


ABG Total CO2   26.6 


 


ABG O2 Saturation   95.3 


 


ABG O2 Content   18.6 


 


ABG Base Excess   0.7 


 


ABG Hemoglobin   14.2 


 


ABG Carboxyhemoglobin   1.7 H 


 


POC ABG HHb (Measured)   4.6 


 


ABG Methemoglobin   0.7 


 


ABG O2 Capacity   19.5 


 


Hgb O2 Saturation   93.1 L 


 


FiO2   50.0 


 


POC Glucose (mg/dL)  131 H  


 


Hemoglobin A1c   


 


Magnesium   


 


Lipase   


 


HIV 1&2 Ag/Ab, 4th Gen   


 


Ur L.pneumophila Ag   


 


Ur Strep pneumoniae Ag   














  10/06/18





  07:39


 


WBC 


 


RBC 


 


Hgb 


 


Hct 


 


MCV 


 


MCH 


 


MCHC 


 


RDW 


 


Plt Count 


 


MPV 


 


Gran % 


 


Lymph % (Auto) 


 


Mono % (Auto) 


 


Eos % (Auto) 


 


Baso % (Auto) 


 


Gran # 


 


Lymph # (Auto) 


 


Mono # (Auto) 


 


Eos # (Auto) 


 


Baso # (Auto) 


 


pCO2 


 


pO2 


 


HCO3 


 


ABG pH 


 


ABG Total CO2 


 


ABG O2 Saturation 


 


ABG O2 Content 


 


ABG Base Excess 


 


ABG Hemoglobin 


 


ABG Carboxyhemoglobin 


 


POC ABG HHb (Measured) 


 


ABG Methemoglobin 


 


ABG O2 Capacity 


 


Hgb O2 Saturation 


 


FiO2 


 


POC Glucose (mg/dL)  160 H


 


Hemoglobin A1c 


 


Magnesium 


 


Lipase 


 


HIV 1&2 Ag/Ab, 4th Gen 


 


Ur L.pneumophila Ag 


 


Ur Strep pneumoniae Ag 











EKG/Cardiology Studies: 


Cardiology / EKG Studies





10/06/18 09:16


EKG [ELECTROCARDIOGRAM] Stat 


   Comment: 


   Reason For Exam: prolonged QT











Fingerstick Blood Sugar Results: 160





Review of Systems





- Review of Systems


Systems not reviewed;Unavailable: Intubated





Critical Care Progress Note





- Ventilator Checklist


Head of Bed 30 Degrees: Yes


Daily Sedation Vacation: Yes


Daily Assessment of Readiness to Wean: Yes


Daily Spontaneous Breathing Trial: Yes


PUD Prophalyxis: Yes


DVT Prophylaxis: Yes





- Vent Settings


MODE:: PRVC


TIDAL VOLUME:: 400


RESP RATE:: 20


FIO2:: 60


PEEP:: 15





- Extremities/Vascular


Does the Patient have a Central Venous Catheter?: No


Does the Patient have a Williamson Catheter?: Yes





- Prophylaxis GI


Prophylaxis GI: PPI





Assessment/Plan





- Assessment and Plan (Free Text)


Assessment: 





57 yo F with PMH of DM2, HTN, COPD, and chronic AFib presented in [facility] via [facility] 

with ROSC achieved after 2 minutes of CPR and one dose of epinephrine. Patient 

had spontaneous, seizure-like movements this AM with some activity on EEG. 





Plan: 





Neuro:


-Patient is currently non-responsive to sternal rub, trapezius pinch


-Gag and pupillary reflexes elicited but sluggish, doll's eyes and corneals 

absent


-Case and EEG results discussed with Dr. Jimenez


-EEG, including seizure-like activity, is consistent with severe anoxic brain 

injury


-Poor prognosis


-Neurology will see her today


-F/u neurology recs





Cardio:


-Currently RRR, normotensive, no longer requiring pressure support


-PCI shortly after arrival in ED showed no significant stenosis, no stents were 

placed


-LVEF found to be between 15-20% on cath


-Amiodarone discontinued, dobutamine continued


-Patient has hx of AFib


-Cardiac arrest likely 2/2 arrhythmia


-F/u cardiology recs





Pulm:


-Intubated and sedated with fentanyl, versed


-Diffuse rhonchi auscultated b/l


-CXR with bilateral pulmonary edema, but improved from yesterday


-Give additional dose of Lasix 40 mg IVP, continue BID


-Vent Settings:  RR 15 PEEP 15 O2 50


-Protective lung ventilation strategy


-Keep head of bed elevated to 30 degrees


-Aspiration pxns


-Daily ABG, CXR





GI:


-NPO on vent


-NG in place, no suction needed, may use for meds


-Consider tube feedings if needed





/Nephro:


-BUN/Cr stable


-UOP low at 350 cc despite lasix


-Continue to monitor


-Maintain euvolemia





Endocrine:


-Maintain euglycemia


-Patient has hx of DM2


-ISS as needed





ID:


-Leukocytosis improved from yesterday


-Urine legionella, strep pneumoniae, and HIV negative


-On empiric cefepime and flagyl 


-ID following, recs appreciated





Heme/Onc:


-H/H stable at 13.2/40.3


-No signs of HD compromise 


-Continue monitoring H/H 





DVT/GI PPX: Protonix and SC heparin


Full Code


Monitor in MICU





Case and plan reviewed and discussed with my attending Dr. Benjamin Neely, DO


IM Resident PGY-1





<Christofer Alexander - Last Filed: 10/06/18 11:35>





CCU Objective





- Vital Signs / Intake & Output


Vital Signs (Last 4 hours): 


Vital Signs











  Temp Pulse Resp BP Pulse Ox


 


 10/06/18 09:00  97.9 F  100 H   107/63  91 L


 


 10/06/18 08:50  97.9 F  105 H    92 L


 


 10/06/18 08:45     99/55 L 


 


 10/06/18 08:44  97.7 F  87    89 L


 


 10/06/18 08:40  97.7 F  98 H    91 L


 


 10/06/18 08:30  97.7 F  91 H   118/51 L  93 L


 


 10/06/18 08:26  97.7 F  103 H  40 H   94 L


 


 10/06/18 08:20  97.5 F L  95 H    93 L


 


 10/06/18 08:15  97.5 F L  87   103/62  93 L


 


 10/06/18 08:10  97.5 F L  92 H    93 L


 


 10/06/18 08:02  95.9 F L  93 H   113/62  93 L


 


 10/06/18 08:00  95.5 F L  99 H   108/64  95


 


 10/06/18 07:53     101/46 L 


 


 10/06/18 07:50  97.9 F  94 H    94 L


 


 10/06/18 07:48  97.7 F  86   101/46 L  95


 


 10/06/18 07:40  97.5 F L  91 H    97











Intake and Output (Last 8hrs): 


                                 Intake & Output











 10/05/18 10/06/18 10/06/18





 22:59 06:59 14:59


 


Intake Total 1069 1350 0


 


Output Total 1810 2310 


 


Balance -741 -960 0


 


Intake:   


 


  IV 1039 1350 0


 


    Left Hand 100 850 


 


    Left Wrist 400  


 


    Right Antecubital 184 348 


 


    Right Hand 283  


 


  Oral 0 0 


 


  Tube Feeding 30  


 


Output:   


 


  Gastric Amount  150 


 


    Left Nares  150 


 


  Urine 1810 1810 


 


    2-way Urethral 1810 1810 


 


  Urine/Stool Mix  350 


 


Other:   


 


  # Bowel Movements 0 0 














- Medications


Active Medications: 


Active Medications











Generic Name Dose Route Start Last Admin





  Trade Name Leonel  PRN Reason Stop Dose Admin


 


Amiodarone HCl  400 mg  10/05/18 10:00  10/05/18 21:06





  Cordarone  PO  10/07/18 23:59  400 mg





  TID JIM   Administration





     





     





     





     


 


Amiodarone HCl  200 mg  10/08/18 10:00  





  Cordarone  PO   





  DAILY JIM   





     





     





     





     


 


Aspirin  81 mg  10/05/18 06:00  10/05/18 19:50





  Aspirin Chewable  NG   81 mg





  DAILY JIM   Administration





     





     





     





     


 


Atorvastatin Calcium  80 mg  10/04/18 17:00  10/05/18 18:06





  Lipitor  NG   80 mg





  DIN JIM   Administration





     





     





     





     


 


Furosemide  40 mg  10/06/18 10:00  





  Lasix  IVP   





  BID JIM   





     





     





     





     


 


Gemfibrozil  600 mg  10/05/18 10:15  10/05/18 18:07





  Lopid  PO   600 mg





  BID JIM   Administration





     





     





     





     


 


Heparin Sodium (Porcine)  5,000 units  10/04/18 20:00  10/06/18 05:06





  Heparin  SC   5,000 units





  Q8 JIM   Administration





     





     





  Protocol   





     


 


Heparin Sodium/Sodium Chloride  25,000 units in 250 mls @ 15.241 mls/hr  

10/04/18 11:30  10/04/18 11:30





  Heparin 24651 Units/250ml 1/2 Normal Saline  IV   Not Given





  .[id number] JIM   





     





     





  Protocol   





  12 UNITS/KG/HR   


 


Levetiracetam  500 mg in 100 mls @ 400 mls/hr  10/04/18 11:30  10/05/18 21:10





  Keppra 500mg Ivpb  IVPB   400 mls/hr





  Q12 JIM   Administration





     





     





     





     


 


Cisatracurium Besylate 200 mg/  270 mls @ 4.66 mls/hr  10/04/18 16:05  10/06/18 

07:30





  Sodium Chloride  IV   0 mcg/kg/min





  .Q24H PRN   0 mls/hr





  TITRATE PER MD ORDER   Titration





     





  Protocol   





  0.5 MCG/KG/MIN   


 


Acetaminophen  1,000 mg in 100 mls @ 400 mls/hr  10/04/18 16:16  





  Ofirmev  IVPB  10/06/18 16:17  





  Q6H PRN   





  for T>100F   





     





     





     


 


Doxycycline Hyclate 100 mg/  100 mls @ 100 mls/hr  10/04/18 22:00  10/05/18 

21:25





  Sodium Chloride  IVPB   100 mls/hr





  Q12 JIM   Administration





     





     





  Protocol   





     


 


Metronidazole  500 mg in 100 mls @ 100 mls/hr  10/04/18 22:00  10/06/18 05:07





  Flagyl  IVPB   100 mls/hr





  Q8 JIM   Administration





     





     





  Protocol   





     


 


Cefepime HCl  2 gm in 100 mls @ 100 mls/hr  10/05/18 08:00  10/06/18 05:03





  Maxipime 2gm  IVPB  10/10/18 08:01  100 mls/hr





  Q8 JIM   Administration





     





     





  Protocol   





     


 


Fentanyl Citrate  1,000 mcg in 100 mls @ 10 mls/hr  10/05/18 10:05  10/06/18 

07:30





  Fentanyl Citrate/Sodium Chloride 1 Mg/100 Ml  IV   0 mcg/hr





  .Q10H PRN   0 mls/hr





  TITRATE PER MD ORDER   Titration





     





  Protocol   





  100 MCG/HR   


 


Midazolam 100 mg/100ml in NS  100 mg in 100 mls @ 1 mls/hr  10/05/18 10:06  

10/06/18 07:30





  Midazolam 100 Mg/100ml In Ns  IV   0 mg/hr





  .Q24H PRN   0 mls/hr





  Sedation   Titration





     





  Protocol   





  1 MG/HR   


 


Vancomycin HCl  1 gm in 250 mls @ 167 mls/hr  10/05/18 21:30  10/05/18 21:47





  Vancomycin 1gm  IVPB   167 mls/hr





  Q12H JIM   Administration





     





     





  Protocol   





     


 


Dobutamine HCl/Dextrose  500 mg in 250 mls @ 17.25 mls/hr  10/06/18 10:47  





  Dobutamine/Dextrose 5% 500mg/250ml  IV   





  .[id number] PRN   





  TITRATE PER PROTOCOL   





     





  Protocol   





  5 MCG/KG/MIN   


 


Insulin Human Regular  0 units  10/04/18 13:00  10/06/18 08:23





  Humulin R Med  SC   Not Given





  Q2H JIM   





     





     





  Protocol   





     


 


Magnesium Oxide  400 mg  10/06/18 10:45  





  Mag-Ox  PO  10/07/18 23:59  





  BID JIM   





     





     





     





     


 


Pantoprazole Sodium  40 mg  10/04/18 11:30  10/05/18 09:12





  Protonix Inj  IVP   40 mg





  DAILY JIM   Administration





     





     





     





     














- Patient Studies


Lab Studies: 


                              Microbiology Studies











 10/04/18 23:00 Gram Stain - Final





 Trachasp 


 


 10/04/18 12:45 Blood Culture - Preliminary





 Blood-Venous    NO GROWTH AFTER 24 HOURS


 


 10/04/18 12:33 Blood Culture - Preliminary





 Blood-Venous    NO GROWTH AFTER 24 HOURS








                                   Lab Studies











  10/06/18 10/06/18 10/06/18 Range/Units





  11:08 07:39 06:30 


 


WBC    14.3 H D  (4.5-11.0)  10^3/ul


 


RBC    4.42  (3.5-6.1)  10^6/uL


 


Hgb    13.2  (12.0-16.0)  g/dL


 


Hct    40.3  (36.0-48.0)  %


 


MCV    91.2  (80.0-105.0)  fl


 


MCH    29.9  (25.0-35.0)  pg


 


MCHC    32.8  (31.0-37.0)  g/dl


 


RDW    15.0 H  (11.5-14.5)  %


 


Plt Count    168  (120.0-450.0)  10^3/uL


 


MPV    12.6 H  (7.0-11.0)  fl


 


Gran %    89.2 H  (50.0-68.0)  %


 


Lymph % (Auto)    6.3 L  (22.0-35.0)  %


 


Mono % (Auto)    4.4  (1.0-6.0)  %


 


Eos % (Auto)    0.1 L  (1.5-5.0)  %


 


Baso % (Auto)    0.0  (0.0-3.0)  %


 


Gran #    12.79 H  (1.4-6.5)  


 


Lymph # (Auto)    0.9 L  (1.2-3.4)  


 


Mono # (Auto)    0.6  (0.1-0.6)  


 


Eos # (Auto)    0.0  (0.0-0.7)  


 


Baso # (Auto)    0.00  (0.0-2.0)  K/mm3


 


pCO2     (35-45)  mm/Hg


 


pO2     ()  mm/Hg


 


HCO3     (21-28)  mmol/L


 


ABG pH     (7.35-7.45)  


 


ABG Total CO2     (22-28)  mmol.L


 


ABG O2 Saturation     (95-98)  %


 


ABG O2 Content     (15-23)  ML/dl


 


ABG Base Excess     (-2.0-3.0)  mmol/L


 


ABG Hemoglobin     (11.7-17.4)  g/dL


 


ABG Carboxyhemoglobin     (0.5-1.5)  %


 


POC ABG HHb (Measured)     (0-5)  %


 


ABG Methemoglobin     (0.0-3.0)  %


 


ABG O2 Capacity     (16-24)  mL/dl


 


Hgb O2 Saturation     (95.0-98.0)  %


 


FiO2     %


 


POC Glucose (mg/dL)  152 H  160 H   ()  mg/dL


 


Hemoglobin A1c     (4.2-6.5)  %


 


Magnesium     (1.7-2.2)  mg/dL


 


Lipase     ()  U/L


 


HIV 1&2 Ag/Ab, 4th Gen     (Nonreactive)  


 


Ur L.pneumophila Ag     (NEGATIVE)  


 


Ur Strep pneumoniae Ag     (Not Detected)  














  10/06/18 10/06/18 10/06/18 Range/Units





  06:00 04:25 02:15 


 


WBC     (4.5-11.0)  10^3/ul


 


RBC     (3.5-6.1)  10^6/uL


 


Hgb     (12.0-16.0)  g/dL


 


Hct     (36.0-48.0)  %


 


MCV     (80.0-105.0)  fl


 


MCH     (25.0-35.0)  pg


 


MCHC     (31.0-37.0)  g/dl


 


RDW     (11.5-14.5)  %


 


Plt Count     (120.0-450.0)  10^3/uL


 


MPV     (7.0-11.0)  fl


 


Gran %     (50.0-68.0)  %


 


Lymph % (Auto)     (22.0-35.0)  %


 


Mono % (Auto)     (1.0-6.0)  %


 


Eos % (Auto)     (1.5-5.0)  %


 


Baso % (Auto)     (0.0-3.0)  %


 


Gran #     (1.4-6.5)  


 


Lymph # (Auto)     (1.2-3.4)  


 


Mono # (Auto)     (0.1-0.6)  


 


Eos # (Auto)     (0.0-0.7)  


 


Baso # (Auto)     (0.0-2.0)  K/mm3


 


pCO2  40    (35-45)  mm/Hg


 


pO2  66.0 L    ()  mm/Hg


 


HCO3  25.4    (21-28)  mmol/L


 


ABG pH  7.41    (7.35-7.45)  


 


ABG Total CO2  26.6    (22-28)  mmol.L


 


ABG O2 Saturation  95.3    (95-98)  %


 


ABG O2 Content  18.6    (15-23)  ML/dl


 


ABG Base Excess  0.7    (-2.0-3.0)  mmol/L


 


ABG Hemoglobin  14.2    (11.7-17.4)  g/dL


 


ABG Carboxyhemoglobin  1.7 H    (0.5-1.5)  %


 


POC ABG HHb (Measured)  4.6    (0-5)  %


 


ABG Methemoglobin  0.7    (0.0-3.0)  %


 


ABG O2 Capacity  19.5    (16-24)  mL/dl


 


Hgb O2 Saturation  93.1 L    (95.0-98.0)  %


 


FiO2  50.0    %


 


POC Glucose (mg/dL)   131 H  107  ()  mg/dL


 


Hemoglobin A1c     (4.2-6.5)  %


 


Magnesium     (1.7-2.2)  mg/dL


 


Lipase     ()  U/L


 


HIV 1&2 Ag/Ab, 4th Gen     (Nonreactive)  


 


Ur L.pneumophila Ag     (NEGATIVE)  


 


Ur Strep pneumoniae Ag     (Not Detected)  














  10/06/18 10/06/18 10/05/18 Range/Units





  00:57 00:30 22:37 


 


WBC     (4.5-11.0)  10^3/ul


 


RBC     (3.5-6.1)  10^6/uL


 


Hgb     (12.0-16.0)  g/dL


 


Hct     (36.0-48.0)  %


 


MCV     (80.0-105.0)  fl


 


MCH     (25.0-35.0)  pg


 


MCHC     (31.0-37.0)  g/dl


 


RDW     (11.5-14.5)  %


 


Plt Count     (120.0-450.0)  10^3/uL


 


MPV     (7.0-11.0)  fl


 


Gran %     (50.0-68.0)  %


 


Lymph % (Auto)     (22.0-35.0)  %


 


Mono % (Auto)     (1.0-6.0)  %


 


Eos % (Auto)     (1.5-5.0)  %


 


Baso % (Auto)     (0.0-3.0)  %


 


Gran #     (1.4-6.5)  


 


Lymph # (Auto)     (1.2-3.4)  


 


Mono # (Auto)     (0.1-0.6)  


 


Eos # (Auto)     (0.0-0.7)  


 


Baso # (Auto)     (0.0-2.0)  K/mm3


 


pCO2     (35-45)  mm/Hg


 


pO2     ()  mm/Hg


 


HCO3     (21-28)  mmol/L


 


ABG pH     (7.35-7.45)  


 


ABG Total CO2     (22-28)  mmol.L


 


ABG O2 Saturation     (95-98)  %


 


ABG O2 Content     (15-23)  ML/dl


 


ABG Base Excess     (-2.0-3.0)  mmol/L


 


ABG Hemoglobin     (11.7-17.4)  g/dL


 


ABG Carboxyhemoglobin     (0.5-1.5)  %


 


POC ABG HHb (Measured)     (0-5)  %


 


ABG Methemoglobin     (0.0-3.0)  %


 


ABG O2 Capacity     (16-24)  mL/dl


 


Hgb O2 Saturation     (95.0-98.0)  %


 


FiO2     %


 


POC Glucose (mg/dL)  87   88  ()  mg/dL


 


Hemoglobin A1c     (4.2-6.5)  %


 


Magnesium   1.7   (1.7-2.2)  mg/dL


 


Lipase     ()  U/L


 


HIV 1&2 Ag/Ab, 4th Gen     (Nonreactive)  


 


Ur L.pneumophila Ag     (NEGATIVE)  


 


Ur Strep pneumoniae Ag     (Not Detected)  














  10/05/18 10/05/18 10/05/18 Range/Units





  21:14 19:57 18:00 


 


WBC     (4.5-11.0)  10^3/ul


 


RBC     (3.5-6.1)  10^6/uL


 


Hgb     (12.0-16.0)  g/dL


 


Hct     (36.0-48.0)  %


 


MCV     (80.0-105.0)  fl


 


MCH     (25.0-35.0)  pg


 


MCHC     (31.0-37.0)  g/dl


 


RDW     (11.5-14.5)  %


 


Plt Count     (120.0-450.0)  10^3/uL


 


MPV     (7.0-11.0)  fl


 


Gran %     (50.0-68.0)  %


 


Lymph % (Auto)     (22.0-35.0)  %


 


Mono % (Auto)     (1.0-6.0)  %


 


Eos % (Auto)     (1.5-5.0)  %


 


Baso % (Auto)     (0.0-3.0)  %


 


Gran #     (1.4-6.5)  


 


Lymph # (Auto)     (1.2-3.4)  


 


Mono # (Auto)     (0.1-0.6)  


 


Eos # (Auto)     (0.0-0.7)  


 


Baso # (Auto)     (0.0-2.0)  K/mm3


 


pCO2     (35-45)  mm/Hg


 


pO2     ()  mm/Hg


 


HCO3     (21-28)  mmol/L


 


ABG pH     (7.35-7.45)  


 


ABG Total CO2     (22-28)  mmol.L


 


ABG O2 Saturation     (95-98)  %


 


ABG O2 Content     (15-23)  ML/dl


 


ABG Base Excess     (-2.0-3.0)  mmol/L


 


ABG Hemoglobin     (11.7-17.4)  g/dL


 


ABG Carboxyhemoglobin     (0.5-1.5)  %


 


POC ABG HHb (Measured)     (0-5)  %


 


ABG Methemoglobin     (0.0-3.0)  %


 


ABG O2 Capacity     (16-24)  mL/dl


 


Hgb O2 Saturation     (95.0-98.0)  %


 


FiO2     %


 


POC Glucose (mg/dL)  107  125 H  140 H  ()  mg/dL


 


Hemoglobin A1c     (4.2-6.5)  %


 


Magnesium     (1.7-2.2)  mg/dL


 


Lipase     ()  U/L


 


HIV 1&2 Ag/Ab, 4th Gen     (Nonreactive)  


 


Ur L.pneumophila Ag     (NEGATIVE)  


 


Ur Strep pneumoniae Ag     (Not Detected)  














  10/05/18 10/05/18 10/05/18 Range/Units





  15:58 13:16 12:50 


 


WBC     (4.5-11.0)  10^3/ul


 


RBC     (3.5-6.1)  10^6/uL


 


Hgb     (12.0-16.0)  g/dL


 


Hct     (36.0-48.0)  %


 


MCV     (80.0-105.0)  fl


 


MCH     (25.0-35.0)  pg


 


MCHC     (31.0-37.0)  g/dl


 


RDW     (11.5-14.5)  %


 


Plt Count     (120.0-450.0)  10^3/uL


 


MPV     (7.0-11.0)  fl


 


Gran %     (50.0-68.0)  %


 


Lymph % (Auto)     (22.0-35.0)  %


 


Mono % (Auto)     (1.0-6.0)  %


 


Eos % (Auto)     (1.5-5.0)  %


 


Baso % (Auto)     (0.0-3.0)  %


 


Gran #     (1.4-6.5)  


 


Lymph # (Auto)     (1.2-3.4)  


 


Mono # (Auto)     (0.1-0.6)  


 


Eos # (Auto)     (0.0-0.7)  


 


Baso # (Auto)     (0.0-2.0)  K/mm3


 


pCO2     (35-45)  mm/Hg


 


pO2     ()  mm/Hg


 


HCO3     (21-28)  mmol/L


 


ABG pH     (7.35-7.45)  


 


ABG Total CO2     (22-28)  mmol.L


 


ABG O2 Saturation     (95-98)  %


 


ABG O2 Content     (15-23)  ML/dl


 


ABG Base Excess     (-2.0-3.0)  mmol/L


 


ABG Hemoglobin     (11.7-17.4)  g/dL


 


ABG Carboxyhemoglobin     (0.5-1.5)  %


 


POC ABG HHb (Measured)     (0-5)  %


 


ABG Methemoglobin     (0.0-3.0)  %


 


ABG O2 Capacity     (16-24)  mL/dl


 


Hgb O2 Saturation     (95.0-98.0)  %


 


FiO2     %


 


POC Glucose (mg/dL)  160 H  177 H   ()  mg/dL


 


Hemoglobin A1c     (4.2-6.5)  %


 


Magnesium     (1.7-2.2)  mg/dL


 


Lipase     ()  U/L


 


HIV 1&2 Ag/Ab, 4th Gen     (Nonreactive)  


 


Ur L.pneumophila Ag    Negative  (NEGATIVE)  


 


Ur Strep pneumoniae Ag     (Not Detected)  














  10/05/18 10/05/18 10/05/18 Range/Units





  11:41 10:45 05:30 


 


WBC     (4.5-11.0)  10^3/ul


 


RBC     (3.5-6.1)  10^6/uL


 


Hgb     (12.0-16.0)  g/dL


 


Hct     (36.0-48.0)  %


 


MCV     (80.0-105.0)  fl


 


MCH     (25.0-35.0)  pg


 


MCHC     (31.0-37.0)  g/dl


 


RDW     (11.5-14.5)  %


 


Plt Count     (120.0-450.0)  10^3/uL


 


MPV     (7.0-11.0)  fl


 


Gran %     (50.0-68.0)  %


 


Lymph % (Auto)     (22.0-35.0)  %


 


Mono % (Auto)     (1.0-6.0)  %


 


Eos % (Auto)     (1.5-5.0)  %


 


Baso % (Auto)     (0.0-3.0)  %


 


Gran #     (1.4-6.5)  


 


Lymph # (Auto)     (1.2-3.4)  


 


Mono # (Auto)     (0.1-0.6)  


 


Eos # (Auto)     (0.0-0.7)  


 


Baso # (Auto)     (0.0-2.0)  K/mm3


 


pCO2     (35-45)  mm/Hg


 


pO2     ()  mm/Hg


 


HCO3     (21-28)  mmol/L


 


ABG pH     (7.35-7.45)  


 


ABG Total CO2     (22-28)  mmol.L


 


ABG O2 Saturation     (95-98)  %


 


ABG O2 Content     (15-23)  ML/dl


 


ABG Base Excess     (-2.0-3.0)  mmol/L


 


ABG Hemoglobin     (11.7-17.4)  g/dL


 


ABG Carboxyhemoglobin     (0.5-1.5)  %


 


POC ABG HHb (Measured)     (0-5)  %


 


ABG Methemoglobin     (0.0-3.0)  %


 


ABG O2 Capacity     (16-24)  mL/dl


 


Hgb O2 Saturation     (95.0-98.0)  %


 


FiO2     %


 


POC Glucose (mg/dL)  214 H    ()  mg/dL


 


Hemoglobin A1c    6.5  (4.2-6.5)  %


 


Magnesium     (1.7-2.2)  mg/dL


 


Lipase   89   ()  U/L


 


HIV 1&2 Ag/Ab, 4th Gen     (Nonreactive)  


 


Ur L.pneumophila Ag     (NEGATIVE)  


 


Ur Strep pneumoniae Ag     (Not Detected)  














  10/05/18 10/04/18 Range/Units





  05:30 13:00 


 


WBC    (4.5-11.0)  10^3/ul


 


RBC    (3.5-6.1)  10^6/uL


 


Hgb    (12.0-16.0)  g/dL


 


Hct    (36.0-48.0)  %


 


MCV    (80.0-105.0)  fl


 


MCH    (25.0-35.0)  pg


 


MCHC    (31.0-37.0)  g/dl


 


RDW    (11.5-14.5)  %


 


Plt Count    (120.0-450.0)  10^3/uL


 


MPV    (7.0-11.0)  fl


 


Gran %    (50.0-68.0)  %


 


Lymph % (Auto)    (22.0-35.0)  %


 


Mono % (Auto)    (1.0-6.0)  %


 


Eos % (Auto)    (1.5-5.0)  %


 


Baso % (Auto)    (0.0-3.0)  %


 


Gran #    (1.4-6.5)  


 


Lymph # (Auto)    (1.2-3.4)  


 


Mono # (Auto)    (0.1-0.6)  


 


Eos # (Auto)    (0.0-0.7)  


 


Baso # (Auto)    (0.0-2.0)  K/mm3


 


pCO2    (35-45)  mm/Hg


 


pO2    ()  mm/Hg


 


HCO3    (21-28)  mmol/L


 


ABG pH    (7.35-7.45)  


 


ABG Total CO2    (22-28)  mmol.L


 


ABG O2 Saturation    (95-98)  %


 


ABG O2 Content    (15-23)  ML/dl


 


ABG Base Excess    (-2.0-3.0)  mmol/L


 


ABG Hemoglobin    (11.7-17.4)  g/dL


 


ABG Carboxyhemoglobin    (0.5-1.5)  %


 


POC ABG HHb (Measured)    (0-5)  %


 


ABG Methemoglobin    (0.0-3.0)  %


 


ABG O2 Capacity    (16-24)  mL/dl


 


Hgb O2 Saturation    (95.0-98.0)  %


 


FiO2    %


 


POC Glucose (mg/dL)    ()  mg/dL


 


Hemoglobin A1c    (4.2-6.5)  %


 


Magnesium    (1.7-2.2)  mg/dL


 


Lipase    ()  U/L


 


HIV 1&2 Ag/Ab, 4th Gen  Nonreactive   (Nonreactive)  


 


Ur L.pneumophila Ag    (NEGATIVE)  


 


Ur Strep pneumoniae Ag   Not detected  (Not Detected)  








                         Laboratory Results - last 24 hr











  10/04/18 10/05/18 10/05/18





  13:00 05:30 05:30


 


WBC   


 


RBC   


 


Hgb   


 


Hct   


 


MCV   


 


MCH   


 


MCHC   


 


RDW   


 


Plt Count   


 


MPV   


 


Gran %   


 


Lymph % (Auto)   


 


Mono % (Auto)   


 


Eos % (Auto)   


 


Baso % (Auto)   


 


Gran #   


 


Lymph # (Auto)   


 


Mono # (Auto)   


 


Eos # (Auto)   


 


Baso # (Auto)   


 


pCO2   


 


pO2   


 


HCO3   


 


ABG pH   


 


ABG Total CO2   


 


ABG O2 Saturation   


 


ABG O2 Content   


 


ABG Base Excess   


 


ABG Hemoglobin   


 


ABG Carboxyhemoglobin   


 


POC ABG HHb (Measured)   


 


ABG Methemoglobin   


 


ABG O2 Capacity   


 


Hgb O2 Saturation   


 


FiO2   


 


POC Glucose (mg/dL)   


 


Hemoglobin A1c    6.5


 


Magnesium   


 


Lipase   


 


HIV 1&2 Ag/Ab, 4th Gen   Nonreactive 


 


Ur L.pneumophila Ag   


 


Ur Strep pneumoniae Ag  Not detected  














  10/05/18 10/05/18 10/05/18





  10:45 11:41 12:50


 


WBC   


 


RBC   


 


Hgb   


 


Hct   


 


MCV   


 


MCH   


 


MCHC   


 


RDW   


 


Plt Count   


 


MPV   


 


Gran %   


 


Lymph % (Auto)   


 


Mono % (Auto)   


 


Eos % (Auto)   


 


Baso % (Auto)   


 


Gran #   


 


Lymph # (Auto)   


 


Mono # (Auto)   


 


Eos # (Auto)   


 


Baso # (Auto)   


 


pCO2   


 


pO2   


 


HCO3   


 


ABG pH   


 


ABG Total CO2   


 


ABG O2 Saturation   


 


ABG O2 Content   


 


ABG Base Excess   


 


ABG Hemoglobin   


 


ABG Carboxyhemoglobin   


 


POC ABG HHb (Measured)   


 


ABG Methemoglobin   


 


ABG O2 Capacity   


 


Hgb O2 Saturation   


 


FiO2   


 


POC Glucose (mg/dL)   214 H 


 


Hemoglobin A1c   


 


Magnesium   


 


Lipase  89  


 


HIV 1&2 Ag/Ab, 4th Gen   


 


Ur L.pneumophila Ag    Negative


 


Ur Strep pneumoniae Ag   














  10/05/18 10/05/18 10/05/18





  13:16 15:58 18:00


 


WBC   


 


RBC   


 


Hgb   


 


Hct   


 


MCV   


 


MCH   


 


MCHC   


 


RDW   


 


Plt Count   


 


MPV   


 


Gran %   


 


Lymph % (Auto)   


 


Mono % (Auto)   


 


Eos % (Auto)   


 


Baso % (Auto)   


 


Gran #   


 


Lymph # (Auto)   


 


Mono # (Auto)   


 


Eos # (Auto)   


 


Baso # (Auto)   


 


pCO2   


 


pO2   


 


HCO3   


 


ABG pH   


 


ABG Total CO2   


 


ABG O2 Saturation   


 


ABG O2 Content   


 


ABG Base Excess   


 


ABG Hemoglobin   


 


ABG Carboxyhemoglobin   


 


POC ABG HHb (Measured)   


 


ABG Methemoglobin   


 


ABG O2 Capacity   


 


Hgb O2 Saturation   


 


FiO2   


 


POC Glucose (mg/dL)  177 H  160 H  140 H


 


Hemoglobin A1c   


 


Magnesium   


 


Lipase   


 


HIV 1&2 Ag/Ab, 4th Gen   


 


Ur L.pneumophila Ag   


 


Ur Strep pneumoniae Ag   














  10/05/18 10/05/18 10/05/18





  19:57 21:14 22:37


 


WBC   


 


RBC   


 


Hgb   


 


Hct   


 


MCV   


 


MCH   


 


MCHC   


 


RDW   


 


Plt Count   


 


MPV   


 


Gran %   


 


Lymph % (Auto)   


 


Mono % (Auto)   


 


Eos % (Auto)   


 


Baso % (Auto)   


 


Gran #   


 


Lymph # (Auto)   


 


Mono # (Auto)   


 


Eos # (Auto)   


 


Baso # (Auto)   


 


pCO2   


 


pO2   


 


HCO3   


 


ABG pH   


 


ABG Total CO2   


 


ABG O2 Saturation   


 


ABG O2 Content   


 


ABG Base Excess   


 


ABG Hemoglobin   


 


ABG Carboxyhemoglobin   


 


POC ABG HHb (Measured)   


 


ABG Methemoglobin   


 


ABG O2 Capacity   


 


Hgb O2 Saturation   


 


FiO2   


 


POC Glucose (mg/dL)  125 H  107  88


 


Hemoglobin A1c   


 


Magnesium   


 


Lipase   


 


HIV 1&2 Ag/Ab, 4th Gen   


 


Ur L.pneumophila Ag   


 


Ur Strep pneumoniae Ag   














  10/06/18 10/06/18 10/06/18





  00:30 00:57 02:15


 


WBC   


 


RBC   


 


Hgb   


 


Hct   


 


MCV   


 


MCH   


 


MCHC   


 


RDW   


 


Plt Count   


 


MPV   


 


Gran %   


 


Lymph % (Auto)   


 


Mono % (Auto)   


 


Eos % (Auto)   


 


Baso % (Auto)   


 


Gran #   


 


Lymph # (Auto)   


 


Mono # (Auto)   


 


Eos # (Auto)   


 


Baso # (Auto)   


 


pCO2   


 


pO2   


 


HCO3   


 


ABG pH   


 


ABG Total CO2   


 


ABG O2 Saturation   


 


ABG O2 Content   


 


ABG Base Excess   


 


ABG Hemoglobin   


 


ABG Carboxyhemoglobin   


 


POC ABG HHb (Measured)   


 


ABG Methemoglobin   


 


ABG O2 Capacity   


 


Hgb O2 Saturation   


 


FiO2   


 


POC Glucose (mg/dL)   87  107


 


Hemoglobin A1c   


 


Magnesium  1.7  


 


Lipase   


 


HIV 1&2 Ag/Ab, 4th Gen   


 


Ur L.pneumophila Ag   


 


Ur Strep pneumoniae Ag   














  10/06/18 10/06/18 10/06/18





  04:25 06:00 06:30


 


WBC    14.3 H D


 


RBC    4.42


 


Hgb    13.2


 


Hct    40.3


 


MCV    91.2


 


MCH    29.9


 


MCHC    32.8


 


RDW    15.0 H


 


Plt Count    168


 


MPV    12.6 H


 


Gran %    89.2 H


 


Lymph % (Auto)    6.3 L


 


Mono % (Auto)    4.4


 


Eos % (Auto)    0.1 L


 


Baso % (Auto)    0.0


 


Gran #    12.79 H


 


Lymph # (Auto)    0.9 L


 


Mono # (Auto)    0.6


 


Eos # (Auto)    0.0


 


Baso # (Auto)    0.00


 


pCO2   40 


 


pO2   66.0 L 


 


HCO3   25.4 


 


ABG pH   7.41 


 


ABG Total CO2   26.6 


 


ABG O2 Saturation   95.3 


 


ABG O2 Content   18.6 


 


ABG Base Excess   0.7 


 


ABG Hemoglobin   14.2 


 


ABG Carboxyhemoglobin   1.7 H 


 


POC ABG HHb (Measured)   4.6 


 


ABG Methemoglobin   0.7 


 


ABG O2 Capacity   19.5 


 


Hgb O2 Saturation   93.1 L 


 


FiO2   50.0 


 


POC Glucose (mg/dL)  131 H  


 


Hemoglobin A1c   


 


Magnesium   


 


Lipase   


 


HIV 1&2 Ag/Ab, 4th Gen   


 


Ur L.pneumophila Ag   


 


Ur Strep pneumoniae Ag   














  10/06/18 10/06/18





  07:39 11:08


 


WBC  


 


RBC  


 


Hgb  


 


Hct  


 


MCV  


 


MCH  


 


MCHC  


 


RDW  


 


Plt Count  


 


MPV  


 


Gran %  


 


Lymph % (Auto)  


 


Mono % (Auto)  


 


Eos % (Auto)  


 


Baso % (Auto)  


 


Gran #  


 


Lymph # (Auto)  


 


Mono # (Auto)  


 


Eos # (Auto)  


 


Baso # (Auto)  


 


pCO2  


 


pO2  


 


HCO3  


 


ABG pH  


 


ABG Total CO2  


 


ABG O2 Saturation  


 


ABG O2 Content  


 


ABG Base Excess  


 


ABG Hemoglobin  


 


ABG Carboxyhemoglobin  


 


POC ABG HHb (Measured)  


 


ABG Methemoglobin  


 


ABG O2 Capacity  


 


Hgb O2 Saturation  


 


FiO2  


 


POC Glucose (mg/dL)  160 H  152 H


 


Hemoglobin A1c  


 


Magnesium  


 


Lipase  


 


HIV 1&2 Ag/Ab, 4th Gen  


 


Ur L.pneumophila Ag  


 


Ur Strep pneumoniae Ag  











EKG/Cardiology Studies: 


Cardiology / EKG Studies





10/06/18 09:16


EKG [ELECTROCARDIOGRAM] Stat 


   Comment: 


   Reason For Exam: prolonged QT














Assessment/Plan





- Assessment and Plan (Free Text)


Plan: 


Patient seen and examined on rounds with resident, agree with note with 

following additions/exceptions:


Patient is 57yo female with PMHx of DM2, HTN, COPD, and chronic AFib, mobabdelrahmand sindy zaragoza, presented s/p PEA cardiac arrest in the field, unclear down time, and CPR 

time, intubated


Patient had cardiac cath done, NICM, EF 25%, no stents placed


CT PE protocol negative for PE


Currently intubated sedated, on Fentanyl, Versed, underwent hypothermic protocol


Cardiology, Neurology following, ID following


On exam difficult to assess neurological function due to sedation


Labs, imaging, chart reviewed


CXR today with improvement in pulmonary edema


VEEG today read as seizure activity, will adjust AED accordingly, increase 

Versed drip





Cardiac Arrest


NICM


DM


HTN


COPD


Afib


Morbid Obesity


Pulm edema





Recommend:


- cont with vent support, low tidal volume ventilation,daily ABG, CXR


- Lasix 40mg IV BID


- Cont with Dobutamine drip


- Amio PO


- follow up ECHO


- follow up cardiology


- Versed, Fentanyl


- increase dose of Keppra 1g BID


- follow neurology, VEEG


- Adequate sedation


- FS control


- GI ppx


- DVT ppx


- Monitor in MICU





Prognosis is extremely poor


Family, daughter, son at bedside yesterday and today, updated of clinical 

findings





Critical care time 35 minutes

## 2018-10-06 NOTE — RAD
Date of service: 



10/06/2018



HISTORY:

 Intubated 



COMPARISON:

10/05/2018 



FINDINGS:



LUNGS:

There is improved vascular congestion



PLEURA:

No significant pleural effusion identified, no pneumothorax apparent.



CARDIOVASCULAR:

Mild cardiomegaly



OSSEOUS STRUCTURES:

No significant abnormalities.



VISUALIZED UPPER ABDOMEN:

Normal.



OTHER FINDINGS:

Endotracheal tube and nasogastric tube in satisfactory position



IMPRESSION:

Improved vascular congestion

## 2018-10-06 NOTE — PN
DATE:  10/06/2018



REASON FOR CONSULTATION:  Followup status post code STEMI, status post

collapse, VFib arrest, status post intubated, status post cardiac

catheterization, rule out anoxic encephalopathy.



SUBJECTIVE:  Patient remains on vent, intubated, minimal reflexes.



PHYSICAL EXAMINATION:

VITAL SIGNS:  Temperature afebrile, heart rate 80, blood pressure 107/63.

HEENT:  PERRLA.  Extraocular muscles intact.

NECK:  Supple.  No carotid bruits or thyromegaly.

CHEST:  Clear to auscultation.

HEART:  S1, S2 regular.

ABDOMEN:  Soft.

EXTREMITIES:  Clubbing and cyanosis negative.



LABORATORY DATA:  Blood workup:  WBC 14.3, hemoglobin 13.2, hematocrit

40.3, platelet count 168.  Chemistry shows sodium 130, potassium 3.8,

chloride 97, carbon dioxide 15, anion gap of 18, BUN 18, creatinine 0.7.



IMPRESSION:  A 58-year-old female with history of diabetes, hypertension,

hyperlipidemia, history of possibly nonischemic cardiomyopathy, admitted

after being collapsed.  Status post CPR done and status post cardiac

catheterization done, nonobstructive coronary artery disease limited only

to circ 60-70% stenosis mid segment, severely decreased LV function,

ejection fraction 15-20% on ventilator, rule out anoxic encephalopathy.



RECOMMENDATIONS:  Continue aspirin, continue DVT prophylaxis, increase

Dobutrex to 5.  Initially patient was code chill, now is going to reverse

that.  Continue amiodarone 200 mg.  Once the patient gets extubated, we

will reassess mentally if the patient is salvageable.  Consider

defibrillator.  Discussed with Dr. Levi GOOD.  Overall the patient's

condition is critical.  Long term prognosis is extremely poor.  We will

follow with you.  We will increase Dobutrex to 5 mcg as heart rate and

blood pressure is tolerated.  We will put mag and potassium through the NG

tube.  We will give 40 of potassium stat and mag oxide 400 mg p.o. 3 times

a day.



Thank you, Dr. Prajapati, for providing us the opportunity in taking care of

the patient, Sunshine Gonzalez.





__________________________________________

Booker Peck MD



DD:  10/06/2018 10:33:04

DT:  10/06/2018 11:32:23

Job # [id number]

## 2018-10-06 NOTE — CP.PCM.PN
Subjective





- Date & Time of Evaluation


Date of Evaluation: 10/06/18


Time of Evaluation: 10:50





- Subjective


Subjective: 





Continues to be on the ventilator, unresponsive but has gag reflex. No fevers.





Objective





- Vital Signs/Intake and Output


Vital Signs (last 24 hours): 


                                        











Temp Pulse Resp BP Pulse Ox


 


 95.5 F L  95 H  20   101/46 L  96 


 


 10/06/18 02:00  10/06/18 06:00  10/05/18 06:54  10/06/18 07:53  10/06/18 02:40








Intake and Output: 


                                        











 10/06/18 10/06/18





 06:59 18:59


 


Intake Total 1410 0


 


Output Total 2310 


 


Balance -900 0














- Medications


Medications: 


                               Current Medications





Amiodarone HCl (Cordarone)  400 mg PO TID [facility]


   Stop: 10/07/18 23:59


   Last Admin: 10/05/18 21:06 Dose:  400 mg


Amiodarone HCl (Cordarone)  200 mg PO DAILY [facility]


Aspirin (Aspirin Chewable)  81 mg NG DAILY [facility]


   Last Admin: 10/05/18 19:50 Dose:  81 mg


Atorvastatin Calcium (Lipitor)  80 mg NG DIN [facility]


   Last Admin: 10/05/18 18:06 Dose:  80 mg


Furosemide (Lasix)  40 mg IVP DAILY [facility]


Gemfibrozil (Lopid)  600 mg PO BID [facility]


   Last Admin: 10/05/18 18:07 Dose:  600 mg


Heparin Sodium (Porcine) (Heparin)  5,000 units SC Q8 [facility]; Protocol


   Last Admin: 10/06/18 05:06 Dose:  5,000 units


Dobutamine HCl/Dextrose (Dobutamine/Dextrose 5% 500mg/250ml)  500 mg in 250 mls 

@ 7.62 mls/hr IV .Q24H PRN; Protocol


   PRN Reason: TITRATE PER PROTOCOL


   Last Titration: 10/06/18 07:30 Dose:  0 mcg/kg/min, 0 mls/hr


Heparin Sodium/Sodium Chloride (Heparin 76648 Units/250ml 1/2 Normal Saline)  

25,000 units in 250 mls @ 15.241 mls/hr IV .[id number] [facility]; Protocol


   Last Admin: 10/04/18 11:30 Dose:  Not Given


Levetiracetam (Keppra 500mg Ivpb)  500 mg in 100 mls @ 400 mls/hr IVPB Q12 [facility]


   Last Admin: 10/05/18 21:10 Dose:  400 mls/hr


Cisatracurium Besylate 200 mg/ (Sodium Chloride)  270 mls @ 4.66 mls/hr IV .Q24H

PRN; Protocol


   PRN Reason: TITRATE PER MD ORDER


   Last Titration: 10/06/18 07:30 Dose:  0 mcg/kg/min, 0 mls/hr


Acetaminophen (Ofirmev)  1,000 mg in 100 mls @ 400 mls/hr IVPB Q6H PRN


   PRN Reason: for T>100F


   Stop: 10/06/18 16:17


Doxycycline Hyclate 100 mg/ (Sodium Chloride)  100 mls @ 100 mls/hr IVPB Q12 

[facility]; Protocol


   Last Admin: 10/05/18 21:25 Dose:  100 mls/hr


Metronidazole (Flagyl)  500 mg in 100 mls @ 100 mls/hr IVPB Q8 [facility]; Protocol


   Last Admin: 10/06/18 05:07 Dose:  100 mls/hr


Cefepime HCl (Maxipime 2gm)  2 gm in 100 mls @ 100 mls/hr IVPB Q8 [facility]; Protocol


   Stop: 10/10/18 08:01


   Last Admin: 10/06/18 05:03 Dose:  100 mls/hr


Fentanyl Citrate (Fentanyl Citrate/Sodium Chloride 1 Mg/100 Ml)  1,000 mcg in 

100 mls @ 10 mls/hr IV .Q10H PRN; Protocol


   PRN Reason: TITRATE PER MD ORDER


   Last Titration: 10/06/18 07:30 Dose:  0 mcg/hr, 0 mls/hr


Midazolam 100 mg/100ml in NS (Midazolam 100 Mg/100ml In Ns)  100 mg in 100 mls @

1 mls/hr IV .Q24H PRN; Protocol


   PRN Reason: Sedation


   Last Titration: 10/06/18 07:30 Dose:  0 mg/hr, 0 mls/hr


Vancomycin HCl (Vancomycin 1gm)  1 gm in 250 mls @ 167 mls/hr IVPB Q12H [facility]; 

Protocol


   Last Admin: 10/05/18 21:47 Dose:  167 mls/hr


Insulin Human Regular (Humulin R Med)  0 units SC Q2H [facility]; Protocol


   Last Admin: 10/06/18 08:23 Dose:  Not Given


Pantoprazole Sodium (Protonix Inj)  40 mg IVP DAILY [facility]


   Last Admin: 10/05/18 09:12 Dose:  40 mg











- Labs


Labs: 


                                        





                                 10/06/18 06:30 





                                 10/05/18 05:30 





                                        











PT  12.7 SECONDS (9.4-12.5)  H  10/04/18  09:15    


 


INR  1.10   10/04/18  09:15    


 


APTT  27.3 Seconds (25.1-36.5)   10/04/18  09:15    














- Constitutional


Appears: Chronically Ill, Other (intubated, unresponsive but positive gag 

reflex)





- ENT Exam


Additional comments: 





ET tube in place





- Respiratory Exam


Respiratory Exam: Decreased Breath Sounds





- Cardiovascular Exam


Cardiovascular Exam: +S1, +S2





- GI/Abdominal Exam


GI & Abdominal Exam: Soft.  absent: Tenderness





Assessment and Plan





- Assessment and Plan (Free Text)


Plan: 





Assessment


Systemic inflammatory response syndrome with VDRF due to cardiopulmonary arrest 

R/O acute coronary syndrome, with probable anoxic encephalopathy, R/O severe 

sepsis from aspiration pneumonia


chronic CHF


HTN


DM


COPD


atrial fibrillation


morbid obesity with BMI 40





Plan


continue Vancomycin, Cefepime, Flagyl ,Doxycycline day 2 pending final culture 

results; reviewed CT A/P and CXR


overall prognosis is poor


follow up further recommendations of GI, Neuro, Cardio

## 2018-10-06 NOTE — CARD
--------------- APPROVED REPORT --------------





Date of service: 10/06/2018



EKG Measurement

Heart [id number]

WI [id number]

[id number]

[id number]

[id number]



<Conclusion>

Sinus tachycardia with premature atrial complexes with aberrant 

conduction

Possible Left atrial enlargement

Left ventricular hypertrophy with repolarization abnormality

Marked ST abnormality, possible anterior subendocardial injury

Abnormal ECG

## 2018-10-06 NOTE — CP.PCM.PN
Subjective





- Date & Time of Evaluation


Date of Evaluation: 10/06/18


Time of Evaluation: 13:00





- Subjective


Subjective: 





Mrs. Gonzalez was seen and examined today in the ICU.  I reviewed her EEG results

and discussed the case with her family.  Clinically, the patient had a few 

episodes of facial twitching.  There were no acute events overnight. 





Objective





- Vital Signs/Intake and Output


Vital Signs (last 24 hours): 


                                        











Temp Pulse Resp BP Pulse Ox


 


 100.4 F H  109 H  40 H  100/52 L  95 


 


 10/06/18 17:10  10/06/18 17:10  10/06/18 08:26  10/06/18 17:38  10/06/18 17:10








Intake and Output: 


                                        











 10/06/18 10/06/18





 06:59 18:59


 


Intake Total 1410 938


 


Output Total 2310 1150


 


Balance -900 -212














- Medications


Medications: 


                               Current Medications





Amiodarone HCl (Cordarone)  400 mg PO TID [facility]


   Stop: 10/07/18 23:59


   Last Admin: 10/06/18 16:09 Dose:  400 mg


Amiodarone HCl (Cordarone)  200 mg PO DAILY [facility]


Aspirin (Aspirin Chewable)  81 mg NG DAILY [facility]


   Last Admin: 10/06/18 11:30 Dose:  81 mg


Atorvastatin Calcium (Lipitor)  80 mg NG DIN [facility]


   Last Admin: 10/06/18 17:34 Dose:  80 mg


Furosemide (Lasix)  40 mg IVP BID [facility]


   Last Admin: 10/06/18 17:38 Dose:  40 mg


Gemfibrozil (Lopid)  600 mg PO BID [facility]


   Last Admin: 10/06/18 17:37 Dose:  600 mg


Heparin Sodium (Porcine) (Heparin)  5,000 units SC Q8 [facility]; Protocol


   Last Admin: 10/06/18 13:48 Dose:  5,000 units


Heparin Sodium/Sodium Chloride (Heparin 03037 Units/250ml 1/2 Normal Saline)  

25,000 units in 250 mls @ 15.241 mls/hr IV .[id number] [facility]; Protocol


   Last Admin: 10/04/18 11:30 Dose:  Not Given


Cisatracurium Besylate 200 mg/ (Sodium Chloride)  270 mls @ 4.66 mls/hr IV .Q24H

PRN; Protocol


   PRN Reason: TITRATE PER MD ORDER


   Last Titration: 10/06/18 07:30 Dose:  0 mcg/kg/min, 0 mls/hr


Doxycycline Hyclate 100 mg/ (Sodium Chloride)  100 mls @ 100 mls/hr IVPB Q12 SC

H; Protocol


   Last Admin: 10/06/18 12:56 Dose:  100 mls/hr


Metronidazole (Flagyl)  500 mg in 100 mls @ 100 mls/hr IVPB Q8 [facility]; Protocol


   Last Admin: 10/06/18 13:48 Dose:  100 mls/hr


Cefepime HCl (Maxipime 2gm)  2 gm in 100 mls @ 100 mls/hr IVPB Q8 [facility]; Protocol


   Stop: 10/10/18 08:01


   Last Admin: 10/06/18 17:39 Dose:  100 mls/hr


Fentanyl Citrate (Fentanyl Citrate/Sodium Chloride 1 Mg/100 Ml)  1,000 mcg in 

100 mls @ 10 mls/hr IV .Q10H PRN; Protocol


   PRN Reason: TITRATE PER MD ORDER


   Last Titration: 10/06/18 07:30 Dose:  0 mcg/hr, 0 mls/hr


Vancomycin HCl (Vancomycin 1gm)  1 gm in 250 mls @ 167 mls/hr IVPB Q12H [facility]; 

Protocol


   Last Admin: 10/06/18 12:58 Dose:  167 mls/hr


Dobutamine HCl/Dextrose (Dobutamine/Dextrose 5% 500mg/250ml)  500 mg in 250 mls 

@ 17.25 mls/hr IV .[id number] PRN; Protocol


   PRN Reason: TITRATE PER PROTOCOL


   Last Admin: 10/06/18 11:28 Dose:  5 mcg/kg/min, 17.25 mls/hr


Midazolam 100 mg/100ml in NS (Midazolam 100 Mg/100ml In Ns)  100 mg in 100 mls @

5 mls/hr IV .Q20H PRN; Protocol


   PRN Reason: Sedation


Levetiracetam 1,500 mg/ Sodium (Chloride)  115 mls @ 460 mls/hr IV Q12 [facility]


Insulin Human Regular (Humulin R Med)  0 units SC Q6H [facility]; Protocol


   Last Admin: 10/06/18 17:38 Dose:  Not Given


Magnesium Oxide (Mag-Ox)  400 mg PO BID [facility]


   Stop: 10/07/18 23:59


   Last Admin: 10/06/18 17:36 Dose:  400 mg


Pantoprazole Sodium (Protonix Inj)  40 mg IVP DAILY [facility]


   Last Admin: 10/06/18 10:00 Dose:  40 mg











- Labs


Labs: 


                                        





                                 10/06/18 06:30 





                                 10/06/18 06:30 





                                        











PT  12.7 SECONDS (9.4-12.5)  H  10/04/18  09:15    


 


INR  1.10   10/04/18  09:15    


 


APTT  27.3 Seconds (25.1-36.5)   10/04/18  09:15    














- Constitutional


Appears: Non-toxic





- Head Exam


Head Exam: ATRAUMATIC, NORMAL INSPECTION, NORMOCEPHALIC


Additional comments: 





intubated





- ENT Exam


ENT Exam: Mucous Membranes Moist





- Respiratory Exam


Additional comments: 





on mechanical ventilation





- Cardiovascular Exam


Cardiovascular Exam: Tachycardia





- GI/Abdominal Exam


GI & Abdominal Exam: Distended





- Neurological Exam


Neuro motor strength exam: Left Upper Extremity: 0, Right Upper Extremity: 0, 

Left Lower Extremity: 0, Right Lower Extremity: 0


Additional comments: 





sluggish pupils that are about 2-3 mm.  Slight corneal response, breathing over 

the ventilator.  GCS 5T.  No response to painful stimulus.  Opens eyes 

spontaneously. 





Assessment and Plan


(1) Anoxic brain injury


Assessment & Plan: 


Will repeat CT head and continue conservative management.  Based on the EEG, the

patient has a poor prognosis 


Status: Acute   





(2) Status epilepticus


Assessment & Plan: 


THis is likely due to the severe cortical injury caused by anoxia.  Will 

increase Keppra to 1500 Q12 and increase Versed to 10 mg/hr after a 10 mg bouls.

Repeat EEG tomorrow.


Status: Acute

## 2018-10-07 LAB
ALBUMIN SERPL-MCNC: 3.1 G/DL (ref 3–4.8)
ALBUMIN/GLOB SERPL: 1.1 {RATIO} (ref 1.1–1.8)
ALT SERPL-CCNC: 32 U/L (ref 7–56)
APPEARANCE UR: CLEAR
ARTERIAL BLOOD GAS HEMOGLOBIN: 13.1 G/DL (ref 11.7–17.4)
ARTERIAL BLOOD GAS O2 SAT: 99.2 % (ref 95–98)
ARTERIAL BLOOD GAS PCO2: 39 MM/HG (ref 35–45)
ARTERIAL BLOOD GAS TCO2: 28.3 MMOL.L (ref 22–28)
AST SERPL-CCNC: 43 U/L (ref 14–36)
BASOPHILS # BLD AUTO: 0.01 K/MM3 (ref 0–2)
BASOPHILS NFR BLD: 0.1 % (ref 0–3)
BILIRUB UR-MCNC: NEGATIVE MG/DL
BUN SERPL-MCNC: 24 MG/DL (ref 7–21)
BUN SERPL-MCNC: 28 MG/DL (ref 7–21)
CALCIUM SERPL-MCNC: 8.1 MG/DL (ref 8.4–10.5)
CALCIUM SERPL-MCNC: 8.5 MG/DL (ref 8.4–10.5)
COLOR UR: YELLOW
EOSINOPHIL # BLD: 0 10*3/UL (ref 0–0.7)
EOSINOPHIL NFR BLD: 0.1 % (ref 1.5–5)
EPI CELLS #/AREA URNS HPF: (no result) /HPF (ref 0–5)
ERYTHROCYTE [DISTWIDTH] IN BLOOD BY AUTOMATED COUNT: 15.1 % (ref 11.5–14.5)
GFR NON-AFRICAN AMERICAN: 42
GFR NON-AFRICAN AMERICAN: 51
GLUCOSE UR STRIP-MCNC: NEGATIVE MG/DL
GRANULOCYTES # BLD: 9.42 10*3/UL (ref 1.4–6.5)
GRANULOCYTES NFR BLD: 79.9 % (ref 50–68)
HCO3 BLDA-SCNC: 27.1 MMOL/L (ref 21–28)
HDLC SERPL-MCNC: 41 MG/DL (ref 29–60)
HGB BLD-MCNC: 13.1 G/DL (ref 12–16)
INHALED O2 CONCENTRATION: 60 %
LDLC SERPL-MCNC: 62 MG/DL (ref 0–129)
LEUKOCYTE ESTERASE UR-ACNC: NEGATIVE LEU/UL
LYMPHOCYTES # BLD: 1.2 10*3/UL (ref 1.2–3.4)
LYMPHOCYTES NFR BLD AUTO: 10.5 % (ref 22–35)
MCH RBC QN AUTO: 29.6 PG (ref 25–35)
MCHC RBC AUTO-ENTMCNC: 32.5 G/DL (ref 31–37)
MCV RBC AUTO: 91.2 FL (ref 80–105)
MONOCYTES # BLD AUTO: 1.1 10*3/UL (ref 0.1–0.6)
MONOCYTES NFR BLD: 9.4 % (ref 1–6)
O2 CAP BLDA-SCNC: 18 ML/DL (ref 16–24)
O2 CT BLDA-SCNC: 17.9 ML/DL (ref 15–23)
PH BLDA: 7.45 [PH] (ref 7.35–7.45)
PH UR STRIP: 6 [PH] (ref 4.7–8)
PLATELET # BLD: 178 10^3/UL (ref 120–450)
PMV BLD AUTO: 12.6 FL (ref 7–11)
PO2 BLDA: 88 MM/HG (ref 80–100)
PROT UR STRIP-MCNC: NEGATIVE MG/DL
RBC # BLD AUTO: 4.42 10^6/UL (ref 3.5–6.1)
RBC # UR STRIP: (no result) /UL
SP GR UR STRIP: 1.01 (ref 1–1.03)
UROBILINOGEN UR STRIP-ACNC: 0.2 E.U./DL
WBC # BLD AUTO: 11.8 10^3/UL (ref 4.5–11)
WBC #/AREA URNS HPF: (no result) /HPF (ref 0–6)

## 2018-10-07 RX ADMIN — INSULIN HUMAN SCH: 100 INJECTION, SOLUTION PARENTERAL at 06:30

## 2018-10-07 RX ADMIN — Medication SCH MG: at 10:45

## 2018-10-07 RX ADMIN — METRONIDAZOLE SCH MLS/HR: 500 INJECTION, SOLUTION INTRAVENOUS at 21:34

## 2018-10-07 RX ADMIN — METRONIDAZOLE SCH MLS/HR: 500 INJECTION, SOLUTION INTRAVENOUS at 14:11

## 2018-10-07 RX ADMIN — CEFEPIME SCH MLS/HR: 1 INJECTION, SOLUTION INTRAVENOUS at 21:29

## 2018-10-07 RX ADMIN — INSULIN HUMAN SCH: 100 INJECTION, SOLUTION PARENTERAL at 17:43

## 2018-10-07 RX ADMIN — CEFEPIME SCH MLS/HR: 1 INJECTION, SOLUTION INTRAVENOUS at 05:51

## 2018-10-07 RX ADMIN — METRONIDAZOLE SCH MLS/HR: 500 INJECTION, SOLUTION INTRAVENOUS at 05:49

## 2018-10-07 RX ADMIN — POTASSIUM CHLORIDE SCH MEQ: 1.5 SOLUTION ORAL at 17:56

## 2018-10-07 RX ADMIN — INSULIN HUMAN SCH: 100 INJECTION, SOLUTION PARENTERAL at 00:30

## 2018-10-07 RX ADMIN — POTASSIUM CHLORIDE SCH MEQ: 1.5 SOLUTION ORAL at 10:00

## 2018-10-07 RX ADMIN — DOBUTAMINE HYDROCHLORIDE PRN MLS/HR: 200 INJECTION INTRAVENOUS at 15:55

## 2018-10-07 RX ADMIN — INSULIN HUMAN SCH: 100 INJECTION, SOLUTION PARENTERAL at 12:37

## 2018-10-07 RX ADMIN — DOBUTAMINE HYDROCHLORIDE PRN MLS/HR: 200 INJECTION INTRAVENOUS at 00:10

## 2018-10-07 RX ADMIN — Medication SCH MG: at 17:35

## 2018-10-07 RX ADMIN — CEFEPIME SCH MLS/HR: 1 INJECTION, SOLUTION INTRAVENOUS at 14:11

## 2018-10-07 RX ADMIN — Medication PRN MLS/HR: at 08:00

## 2018-10-07 NOTE — US
HISTORY:

Leg pain and swelling. Evaluate for DVT



PHYSICIAN(S):  Rakesh Mclain MD.



TECHNIQUE:

Duplex sonography and color-flow Doppler with graded compression were 

used to evaluate the deep venous systems of both lower extremities. 

The exam is very limited by body habitus and edema.



FINDINGS:

Hypoechoic acute occlusive thrombus is noted in the left popliteal 

vein.  The left femoral vein and left common femoral vein are patent 

and compressible. 



There is no sonographic evidence for deep venous thrombosis the 

visualized segments of the right lower extremity 



IMPRESSION:

Hypoechoic acute thrombus in the left popliteal vein

## 2018-10-07 NOTE — RAD
Date of service: 



10/07/2018



HISTORY:

 Intubated 



COMPARISON:

10/06/2018 



FINDINGS:



LUNGS:

Endotracheal tube and nasogastric tube in satisfactory position.  No 

focal consolidation



PLEURA:

No significant pleural effusion identified, no pneumothorax apparent.



CARDIOVASCULAR:

Moderate cardiomegaly



OSSEOUS STRUCTURES:

No significant abnormalities.



VISUALIZED UPPER ABDOMEN:

Normal.



OTHER FINDINGS:

None.



IMPRESSION:

Endotracheal tube and nasogastric tube in satisfactory position. No 

focal consolidation

## 2018-10-07 NOTE — CP.PCM.PN
Subjective





- Date & Time of Evaluation


Date of Evaluation: 10/07/18


Time of Evaluation: 08:20





- Subjective


Subjective: 





PGY-1 Medicine Progress Note for Dr. Prajapati's service





Patient seen and examined at bedside. Patient intubated and sedated. 12 point 

ROS limited. Family was at bedside. Discussion was had about possible palliative

care to discuss goals of care.





Objective





- Vital Signs/Intake and Output


Vital Signs (last 24 hours): 


                                        











Temp Pulse Resp BP Pulse Ox


 


 100.6 F H  98 H  20   120/65   96 


 


 10/06/18 18:25  10/07/18 00:10  10/06/18 17:55  10/07/18 00:10  10/06/18 17:55








Intake and Output: 


                                        











 10/06/18 10/07/18





 18:59 06:59


 


Intake Total 958 230


 


Output Total 1150 


 


Balance -192 230














- Medications


Medications: 


                               Current Medications





Amiodarone HCl (Cordarone)  400 mg PO TID [facility]


   Stop: 10/07/18 23:59


   Last Admin: 10/06/18 18:22 Dose:  400 mg


Amiodarone HCl (Cordarone)  200 mg PO DAILY [facility]


Aspirin (Aspirin Chewable)  81 mg NG DAILY [facility]


   Last Admin: 10/06/18 11:30 Dose:  81 mg


Atorvastatin Calcium (Lipitor)  80 mg NG DIN [facility]


   Last Admin: 10/06/18 17:34 Dose:  80 mg


Furosemide (Lasix)  40 mg IVP BID [facility]


   Last Admin: 10/06/18 17:38 Dose:  40 mg


Gemfibrozil (Lopid)  600 mg PO BID [facility]


   Last Admin: 10/06/18 17:37 Dose:  600 mg


Heparin Sodium (Porcine) (Heparin)  5,000 units SC Q8 [facility]; Protocol


   Last Admin: 10/07/18 05:49 Dose:  5,000 units


Heparin Sodium/Sodium Chloride (Heparin 35832 Units/250ml 1/2 Normal Saline)  

25,000 units in 250 mls @ 15.241 mls/hr IV .[id number] [facility]; Protocol


   Last Admin: 10/04/18 11:30 Dose:  Not Given


Cisatracurium Besylate 200 mg/ (Sodium Chloride)  270 mls @ 4.66 mls/hr IV .Q24H

PRN; Protocol


   PRN Reason: TITRATE PER MD ORDER


   Last Titration: 10/06/18 07:30 Dose:  0 mcg/kg/min, 0 mls/hr


Doxycycline Hyclate 100 mg/ (Sodium Chloride)  100 mls @ 100 mls/hr IVPB Q12 

[facility]; Protocol


   Last Admin: 10/06/18 22:06 Dose:  100 mls/hr


Metronidazole (Flagyl)  500 mg in 100 mls @ 100 mls/hr IVPB Q8 [facility]; Protocol


   Last Admin: 10/07/18 05:49 Dose:  100 mls/hr


Cefepime HCl (Maxipime 2gm)  2 gm in 100 mls @ 100 mls/hr IVPB Q8 [facility]; Protocol


   Stop: 10/10/18 08:01


   Last Admin: 10/07/18 05:51 Dose:  100 mls/hr


Vancomycin HCl (Vancomycin 1gm)  1 gm in 250 mls @ 167 mls/hr IVPB Q12H [facility]; 

Protocol


   Last Admin: 10/06/18 22:06 Dose:  167 mls/hr


Dobutamine HCl/Dextrose (Dobutamine/Dextrose 5% 500mg/250ml)  500 mg in 250 mls 

@ 17.25 mls/hr IV .[id number] PRN; Protocol


   PRN Reason: TITRATE PER PROTOCOL


   Last Admin: 10/07/18 00:10 Dose:  5 mcg/kg/min, 17.25 mls/hr


Midazolam 100 mg/100ml in NS (Midazolam 100 Mg/100ml In Ns)  100 mg in 100 mls @

5 mls/hr IV .Q20H PRN; Protocol


   PRN Reason: Sedation


   Last Admin: 10/06/18 15:00 Dose:  5 mg/hr, 5 mls/hr


Levetiracetam 1,500 mg/ Sodium (Chloride)  115 mls @ 460 mls/hr IV Q12 [facility]


   Last Admin: 10/06/18 22:05 Dose:  460 mls/hr


Insulin Human Regular (Humulin R Med)  0 units SC Q6H [facility]; Protocol


   Last Admin: 10/07/18 00:30 Dose:  Not Given


Magnesium Oxide (Mag-Ox)  400 mg PO BID [facility]


   Stop: 10/07/18 23:59


   Last Admin: 10/06/18 17:36 Dose:  400 mg


Pantoprazole Sodium (Protonix Inj)  40 mg IVP DAILY [facility]


   Last Admin: 10/06/18 10:00 Dose:  40 mg











- Labs


Labs: 


                                        





                                 10/07/18 05:30 





                                 10/06/18 06:30 





                                        











PT  12.7 SECONDS (9.4-12.5)  H  10/04/18  09:15    


 


INR  1.10   10/04/18  09:15    


 


APTT  27.3 Seconds (25.1-36.5)   10/04/18  09:15    














- Additional Findings


Additional findings: 





- Constitutional


Appears: Non-toxic, No Acute Distress





- Head Exam


Head Exam: NORMAL INSPECTION, NORMOCEPHALIC





- Eye Exam


Eye Exam: EOMI, Normal appearance.  absent: Nystagmus, Scleral icterus





- ENT Exam


Additional comments: 





W/ ET tube or NG tube





- Respiratory Exam


Respiratory Exam: Clear to Ausculation Bilateral, NORMAL BREATHING PATTERN.  

absent: Rhonchi, Wheezes, Respiratory Distress





- Cardiovascular Exam


Cardiovascular Exam: REGULAR RHYTHM, +S1, +S2





- GI/Abdominal Exam


GI & Abdominal Exam: Soft, Normal Bowel Sounds.  absent: Distended, Firm, 

Guarding, Tenderness


Additional comments: 





serra cather in place





- Extremities Exam


Extremities Exam: Normal Inspection.  absent: Calf Tenderness, Pedal Edema





- Neurological Exam


Neurological Exam: absent: Alert, Awake





- Psychiatric Exam


Psychiatric exam: Normal Affect, Normal Mood





- Skin


Skin Exam: Intact, Normal Color





Assessment and Plan





- Assessment and Plan (Free Text)


Assessment: 





Patient is a 57 yo female with PMH of systolic CHF, HTN, DM, COPD, AFib who 

presents to hospital s/p cardiac arrest. Patient currently intubated/sedated. 

Cardiac cath showed no thrombus, mild nonobstructive CAD only in mid circumflex 

60-70%; EF 15-20%; CT chest showed aspiration pneumonia; PNA studies negative c

urrently; CT head showed acute abnormalities; On keppra for questionable 

seizures


Plan: 





Cardiac Arrest


Unknown etiology


Cardio Consult- Dr. Peck- Cardiac cath performed- showing mild CAD and EF of 10-

15%


Neuro Consult- Dr. Man- Video EEG inconclusive study; anoxic brain injury vs 

siezures (less likely) 


ICU consult- Dr. Danielson- Keppra 500mg for seizure ppx


10-7-18 CT head no acute findings


Patient intubated with ET tube; NG tube; Propofol drip


Dobutamine drip; Amiodarone 400 mg po tid


Aspirin 81 NG


Pending doppler full read





Aspiration Pneumonia


ID consulted- DC IV vanc


10-7-18 - No focal consolidation


10-5-18 Cxray shows resolution of upper lobe infiltrate


Cefepime 2mg (10-5), Flagyl 500mg (10-6), Doxycycline 100mg (10-4) for empiric 

coverage


PNA studies negative 


Bcx, Ucx, Sputum cx negative currently


Leukocytosis trending down; Repeat CMP in AM





Hypertriglycredemia


Lopoid 600mg po bid


Lipitor 80 mg NG din





HTN


coreg 3.125 mg po bid





DM2


ISS low dose


ACHS





Hx of COPD


Patient currently vented on Versed


Resp settings as per ICU





PPx


GI ppx- Protonix 40mg IVP daily


DVT ppx- Heparin 5000 units sc





Medical Management discussed with Dr. Prajapati


PGY-1 Mitzi Franco

## 2018-10-07 NOTE — CP.PCM.PN
Subjective





- Date & Time of Evaluation


Date of Evaluation: 10/07/18


Time of Evaluation: 09:40





- Subjective


Subjective: 





Continues to be on the ventilator, with low grade fevers, has some non-

purposeful movements.





Objective





- Vital Signs/Intake and Output


Vital Signs (last 24 hours): 


                                        











Temp Pulse Resp BP Pulse Ox


 


 100.6 F H  98 H  20   120/65   96 


 


 10/06/18 18:25  10/07/18 00:10  10/06/18 17:55  10/07/18 00:10  10/06/18 17:55








Intake and Output: 


                                        











 10/07/18 10/07/18





 06:59 18:59


 


Intake Total 230 


 


Balance 230 














- Medications


Medications: 


                               Current Medications





Amiodarone HCl (Cordarone)  400 mg PO TID [facility]


   Stop: 10/07/18 23:59


   Last Admin: 10/06/18 18:22 Dose:  400 mg


Amiodarone HCl (Cordarone)  200 mg PO DAILY [facility]


Aspirin (Aspirin Chewable)  81 mg NG DAILY [facility]


   Last Admin: 10/06/18 11:30 Dose:  81 mg


Atorvastatin Calcium (Lipitor)  80 mg NG DIN [facility]


   Last Admin: 10/06/18 17:34 Dose:  80 mg


Furosemide (Lasix)  40 mg IVP BID [facility]


   Last Admin: 10/06/18 17:38 Dose:  40 mg


Gemfibrozil (Lopid)  600 mg PO BID [facility]


   Last Admin: 10/06/18 17:37 Dose:  600 mg


Heparin Sodium (Porcine) (Heparin)  5,000 units SC Q8 [facility]; Protocol


   Last Admin: 10/07/18 05:49 Dose:  5,000 units


Heparin Sodium/Sodium Chloride (Heparin 84698 Units/250ml 1/2 Normal Saline)  

25,000 units in 250 mls @ 15.241 mls/hr IV .[id number] [facility]; Protocol


   Last Admin: 10/04/18 11:30 Dose:  Not Given


Cisatracurium Besylate 200 mg/ (Sodium Chloride)  270 mls @ 4.66 mls/hr IV .Q24H

PRN; Protocol


   PRN Reason: TITRATE PER MD ORDER


   Last Titration: 10/06/18 07:30 Dose:  0 mcg/kg/min, 0 mls/hr


Doxycycline Hyclate 100 mg/ (Sodium Chloride)  100 mls @ 100 mls/hr IVPB Q12 

[facility]; Protocol


   Last Admin: 10/06/18 22:06 Dose:  100 mls/hr


Metronidazole (Flagyl)  500 mg in 100 mls @ 100 mls/hr IVPB Q8 [facility]; Protocol


   Last Admin: 10/07/18 05:49 Dose:  100 mls/hr


Cefepime HCl (Maxipime 2gm)  2 gm in 100 mls @ 100 mls/hr IVPB Q8 [facility]; Protocol


   Stop: 10/10/18 08:01


   Last Admin: 10/07/18 05:51 Dose:  100 mls/hr


Dobutamine HCl/Dextrose (Dobutamine/Dextrose 5% 500mg/250ml)  500 mg in 250 mls 

@ 17.25 mls/hr IV .[id number] [facility]N; Protocol


   PRN Reason: TITRATE PER PROTOCOL


   Last Admin: 10/07/18 00:10 Dose:  5 mcg/kg/min, 17.25 mls/hr


Midazolam 100 mg/100ml in NS (Midazolam 100 Mg/100ml In Ns)  100 mg in 100 mls @

5 mls/hr IV .Q20H [facility]; Protocol


   PRN Reason: Sedation


   Last Admin: 10/06/18 15:00 Dose:  5 mg/hr, 5 mls/hr


Levetiracetam 1,500 mg/ Sodium (Chloride)  115 mls @ 460 mls/hr IV Q12 [facility]


   Last Admin: 10/06/18 22:05 Dose:  460 mls/hr


Insulin Human Regular (Humulin R Med)  0 units SC Q6H [facility]; Protocol


   Last Admin: 10/07/18 00:30 Dose:  Not Given


Magnesium Oxide (Mag-Ox)  400 mg PO BID [facility]


   Stop: 10/07/18 23:59


   Last Admin: 10/06/18 17:36 Dose:  400 mg


Pantoprazole Sodium (Protonix Inj)  40 mg IVP DAILY [facility]


   Last Admin: 10/06/18 10:00 Dose:  40 mg


Potassium Chloride (Potassium Chloride Oral Soln)  40 meq PO Q4H [facility]


   Stop: 10/07/18 15:01











- Labs


Labs: 


                                        





                                 10/07/18 05:30 





                                 10/07/18 05:30 





                                        











PT  12.7 SECONDS (9.4-12.5)  H  10/04/18  09:15    


 


INR  1.10   10/04/18  09:15    


 


APTT  27.3 Seconds (25.1-36.5)   10/04/18  09:15    














- Constitutional


Appears: Other (intubated, poorly responsive)





- ENT Exam


Additional comments: 





ET tube in place





- Respiratory Exam


Respiratory Exam: Decreased Breath Sounds





- Cardiovascular Exam


Cardiovascular Exam: +S1, +S2





- GI/Abdominal Exam


GI & Abdominal Exam: Soft.  absent: Tenderness





Assessment and Plan





- Assessment and Plan (Free Text)


Plan: 





Assessment


Systemic inflammatory response syndrome with VDRF due to cardiopulmonary arrest 

R/O acute coronary syndrome, with probable anoxic encephalopathy, R/O severe 

sepsis from aspiration pneumonia


chronic CHF


HTN


DM


COPD


atrial fibrillation


morbid obesity with BMI 40





Plan


continue Cefepime, Flagyl ,Doxycycline day 3; cultures and MRSA nares are 

negative, will d/c IV Vancomycin; reviewed CT A/P and CXR


overall prognosis is poor


follow up further recommendations of GI, Neuro, Cardio

## 2018-10-07 NOTE — CP.PCM.PN
Subjective





- Date & Time of Evaluation


Date of Evaluation: 10/07/18


Time of Evaluation: 17:56





- Subjective


Subjective: 





Mrs. Gonzalez was seen and examined today at bedside in the ICU.  Clinically, she

appeared significantly worse than yesterday.  She did not have any brainstem 

reflexes and was not responsive to painful stimulus. This was discussed with hasmukh rodriguez. 





Objective





- Vital Signs/Intake and Output


Vital Signs (last 24 hours): 


                                        











Temp Pulse Resp BP Pulse Ox


 


 99.3 F   84   21   150/81   96 


 


 10/07/18 16:50  10/07/18 17:42  10/07/18 08:22  10/07/18 17:42  10/07/18 16:50








Intake and Output: 


                                        











 10/07/18 10/07/18





 06:59 18:59


 


Intake Total 1464 350


 


Output Total 750 1000


 


Balance 714 -650














- Medications


Medications: 


                               Current Medications





Amiodarone HCl (Cordarone)  400 mg PO TID [facility]


   Stop: 10/07/18 23:59


   Last Admin: 10/07/18 17:42 Dose:  400 mg


Amiodarone HCl (Cordarone)  200 mg PO DAILY [facility]


Artificial Tears (Refresh Opth Soln)  0.3 ml OU Q4H PRN


   PRN Reason: Dry eyes


Aspirin (Aspirin Chewable)  81 mg NG DAILY [facility]


   Last Admin: 10/07/18 10:45 Dose:  81 mg


Atorvastatin Calcium (Lipitor)  80 mg NG DIN [facility]


   Last Admin: 10/07/18 16:22 Dose:  80 mg


Carvedilol (Coreg)  3.125 mg PO BID [facility]


   Last Admin: 10/07/18 17:41 Dose:  3.125 mg


Furosemide (Lasix)  40 mg IVP DAILY [facility]


   Last Admin: 10/07/18 10:47 Dose:  40 mg


Gemfibrozil (Lopid)  600 mg PO BID [facility]


   Last Admin: 10/07/18 17:35 Dose:  600 mg


Heparin Sodium (Porcine) (Heparin)  5,000 units SC Q8 [facility]; Protocol


   Last Admin: 10/07/18 14:11 Dose:  5,000 units


Heparin Sodium/Sodium Chloride (Heparin 64610 Units/250ml 1/2 Normal Saline)  

25,000 units in 250 mls @ 15.241 mls/hr IV .[id number] [facility]; Protocol


   Last Admin: 10/04/18 11:30 Dose:  Not Given


Cisatracurium Besylate 200 mg/ (Sodium Chloride)  270 mls @ 4.66 mls/hr IV .Q24H

PRN; Protocol


   PRN Reason: TITRATE PER MD ORDER


   Last Titration: 10/06/18 07:30 Dose:  0 mcg/kg/min, 0 mls/hr


Doxycycline Hyclate 100 mg/ (Sodium Chloride)  100 mls @ 100 mls/hr IVPB Q12 

[facility]; Protocol


   Last Admin: 10/07/18 12:34 Dose:  100 mls/hr


Metronidazole (Flagyl)  500 mg in 100 mls @ 100 mls/hr IVPB Q8 [facility]; Protocol


   Last Admin: 10/07/18 14:11 Dose:  100 mls/hr


Cefepime HCl (Maxipime 2gm)  2 gm in 100 mls @ 100 mls/hr IVPB Q8 [facility]; Protocol


   Stop: 10/10/18 08:01


   Last Admin: 10/07/18 14:11 Dose:  100 mls/hr


Dobutamine HCl/Dextrose (Dobutamine/Dextrose 5% 500mg/250ml)  500 mg in 250 mls 

@ 17.25 mls/hr IV .[id number] PRN; Protocol


   PRN Reason: TITRATE PER PROTOCOL


   Last Admin: 10/07/18 15:55 Dose:  5 mcg/kg/min, 17.25 mls/hr


Midazolam 100 mg/100ml in NS (Midazolam 100 Mg/100ml In Ns)  100 mg in 100 mls @

5 mls/hr IV .Q20H PRN; Protocol


   PRN Reason: Sedation


   Last Admin: 10/07/18 08:00 Dose:  5 mg/hr, 5 mls/hr


Levetiracetam 1,500 mg/ Sodium (Chloride)  115 mls @ 460 mls/hr IV Q12 [facility]


   Last Admin: 10/07/18 10:45 Dose:  460 mls/hr


Insulin Human Regular (Humulin R Med)  0 units SC Q6H [facility]; Protocol


   Last Admin: 10/07/18 17:43 Dose:  Not Given


Lisinopril (Zestril)  2.5 mg PO DAILY [facility]


   Last Admin: 10/07/18 12:31 Dose:  2.5 mg


Magnesium Oxide (Mag-Ox)  400 mg PO BID [facility]


   Stop: 10/07/18 23:59


   Last Admin: 10/07/18 17:35 Dose:  400 mg


Pantoprazole Sodium (Protonix Inj)  40 mg IVP DAILY [facility]


   Last Admin: 10/07/18 10:58 Dose:  40 mg


Spironolactone (Aldactone)  25 mg PO BID [facility]


   Last Admin: 10/07/18 17:35 Dose:  25 mg











- Labs


Labs: 


                                        





                                 10/07/18 05:30 





                                 10/07/18 05:30 





                                        











PT  12.7 SECONDS (9.4-12.5)  H  10/04/18  09:15    


 


INR  1.10   10/04/18  09:15    


 


APTT  27.3 Seconds (25.1-36.5)   10/04/18  09:15    














- Constitutional


Appears: Chronically Ill





- Head Exam


Additional comments: 





intubated, off sedation. 





- Eye Exam


Eye Exam: Conjunctival injection





- Respiratory Exam


Additional comments: 





Not breathing over the ventilator





- Cardiovascular Exam


Cardiovascular Exam: REGULAR RHYTHM, +S1, +S2.  absent: Murmur





- GI/Abdominal Exam


GI & Abdominal Exam: Soft, Normal Bowel Sounds.  absent: Tenderness





- Neurological Exam


Neuro motor strength exam: Left Upper Extremity: 0, Right Upper Extremity: 0, 

Left Lower Extremity: 0, Right Lower Extremity: 0


Additional comments: 





GCS 3T: no pupillary response, no corneal response and not breathing over the 

vent





Assessment and Plan


(1) Anoxic brain injury


Assessment & Plan: 


The patient appears to be nearly brain dead at this time.  I discussed 

confirmatory testing with brain blood flow and EEG.  Will discuss with family 

after results.  


Status: Acute   





(2) Status epilepticus


Assessment & Plan: 


AEDs were increased.  Will repeat EEG. 


Status: Acute

## 2018-10-07 NOTE — CP.CCUPN
<Eliu Neely - Last Filed: 10/07/18 11:02>





CCU Subjective





- Physician Review


Subjective (Free Text): 


Eliu Neely DO, PGY-1 ICU Progress Note for Dr. Alexander


Patient is a 58 year old female with PMH of DM2, HTN, COPD, and chronic AFib who

presented to ED following witnessed syncopal episode and cardiac arrest. The 

initial rhythm was found to be PEA. Compressions were started immediately per 

EMS. Unclear from records exactly how long she was in cardiac arrest prior to E

MS arrival. Per EMS, ROSC was achieved after 2 minutes of CPR. 








CCU Objective





- Vital Signs / Intake & Output


Vital Signs (Last 4 hours): 


Vital Signs











  Temp Pulse Resp BP Pulse Ox


 


 10/07/18 08:45     145/78 


 


 10/07/18 08:44  97.0 F L  75    96


 


 10/07/18 08:40  97.0 F L  79    96


 


 10/07/18 08:30  97.0 F L  79   157/77 H  95


 


 10/07/18 08:22  97.0 F L  78  21   95


 


 10/07/18 08:20  97.0 F L  77  20   96


 


 10/07/18 08:18   76  23  153/87 H  95


 


 10/07/18 07:50  97.2 F L    


 


 10/07/18 07:40  97.3 F L     100


 


 10/07/18 07:30  97.5 F L  77   131/75  99


 


 10/07/18 07:20  97.5 F L  75    100


 


 10/07/18 07:15  97.7 F  75   127/70  99


 


 10/07/18 07:10  97.9 F  76    99


 


 10/07/18 07:00  98.1 F  77   125/69  98


 


 10/07/18 06:50  98.2 F  78    99


 


 10/07/18 06:45  98.4 F  81   116/66  98


 


 10/07/18 06:40  98.4 F  83    99











Intake and Output (Last 8hrs): 


                                 Intake & Output











 10/06/18 10/07/18 10/07/18





 22:59 06:59 14:59


 


Intake Total 958 1464 


 


Output Total 1150 750 


 


Balance -192 714 


 


Weight  245 lb 11.2 oz 


 


Intake:   


 


   230 


 


    Right Antecubital 938  


 


  Oral  0 


 


  Tube Feeding  0 


 


  TPN/PPN  0 


 


  Blood Product  0 


 


  Lipid  0 


 


  Albumin  0 


 


  Other  1234 


 


Output:   


 


  Gastric Amount  50 


 


    Left Nares  50 


 


  Urine 1150 700 


 


    2-way Urethral 1150 700 


 


  Stool  0 


 


  Urine/Stool Mix  0 


 


  Emesis  0 


 


  Oral Regurgitation  0 


 


  Other  0 


 


Other:   


 


  # Voids   


 


    2-way Urethral  0 


 


  # Bowel Movements 0 0 














- Physical Exam


Head: Positive for: Atraumatic, Other (Intubated)


Pupils: Positive for: Non-Reactive, Other (Doll's eyes, gag, corneal, pupillary 

reflexes all absent)


Conjunctiva: Positive for: Normal


Mouth: Positive for: Moist Mucous Membranes


Neck: Negative for: JVD


Respiratory/Chest: Positive for: Clear to Auscultation, Good Air Exchange.  

Negative for: Respiratory Distress, Accessory Muscle Use, Wheezes, Rales, 

Rhonchi


Cardiovascular: Positive for: Regular Rate and Rhythm, Normal S1, S2.  Negative 

for: Murmurs, Rub, Gallop


Abdomen: Negative for: Distention, Mass/Organomegaly


Back: Positive for: Normal Inspection


Upper Extremity: Positive for: NORMAL PULSES.  Negative for: Cyanosis, Edema


Lower Extremity: Positive for: Edema (LE pitting edema improved from yesterday),

NORMAL PULSES


Neurological: Positive for: Other (No spontaneous movements, non-responsive to 

sternal rub or trapezius pinch, no doll's eyes, gag, pupillary, or corneal 

reflexes)


Skin: Positive for: Warm, Dry, Normal Color.  Negative for: Rashes





- Medications


Active Medications: 


Active Medications











Generic Name Dose Route Start Last Admin





  Trade Name Freq  PRN Reason Stop Dose Admin


 


Amiodarone HCl  400 mg  10/05/18 10:00  10/06/18 18:22





  Cordarone  PO  10/07/18 23:59  400 mg





  TID JIM   Administration





     





     





     





     


 


Amiodarone HCl  200 mg  10/08/18 10:00  





  Cordarone  PO   





  DAILY JIM   





     





     





     





     


 


Aspirin  81 mg  10/05/18 06:00  10/06/18 11:30





  Aspirin Chewable  NG   81 mg





  DAILY JIM   Administration





     





     





     





     


 


Atorvastatin Calcium  80 mg  10/04/18 17:00  10/06/18 17:34





  Lipitor  NG   80 mg





  DIN JIM   Administration





     





     





     





     


 


Carvedilol  3.125 mg  10/07/18 10:00  





  Coreg  PO   





  BID JIM   





     





     





     





     


 


Furosemide  40 mg  10/07/18 10:00  





  Lasix  IVP   





  DAILY JIM   





     





     





     





     


 


Gemfibrozil  600 mg  10/05/18 10:15  10/06/18 17:37





  Lopid  PO   600 mg





  BID JIM   Administration





     





     





     





     


 


Heparin Sodium (Porcine)  5,000 units  10/04/18 20:00  10/07/18 05:49





  Heparin  SC   5,000 units





  Q8 JIM   Administration





     





     





  Protocol   





     


 


Heparin Sodium/Sodium Chloride  25,000 units in 250 mls @ 15.241 mls/hr  

10/04/18 11:30  10/04/18 11:30





  Heparin 83323 Units/250ml 1/2 Normal Saline  IV   Not Given





  .[id number] JIM   





     





     





  Protocol   





  12 UNITS/KG/HR   


 


Cisatracurium Besylate 200 mg/  270 mls @ 4.66 mls/hr  10/04/18 16:05  10/06/18 

07:30





  Sodium Chloride  IV   0 mcg/kg/min





  .Q24H PRN   0 mls/hr





  TITRATE PER MD ORDER   Titration





     





  Protocol   





  0.5 MCG/KG/MIN   


 


Doxycycline Hyclate 100 mg/  100 mls @ 100 mls/hr  10/04/18 22:00  10/06/18 

22:06





  Sodium Chloride  IVPB   100 mls/hr





  Q12 JIM   Administration





     





     





  Protocol   





     


 


Metronidazole  500 mg in 100 mls @ 100 mls/hr  10/04/18 22:00  10/07/18 05:49





  Flagyl  IVPB   100 mls/hr





  Q8 JIM   Administration





     





     





  Protocol   





     


 


Cefepime HCl  2 gm in 100 mls @ 100 mls/hr  10/05/18 08:00  10/07/18 05:51





  Maxipime 2gm  IVPB  10/10/18 08:01  100 mls/hr





  Q8 JIM   Administration





     





     





  Protocol   





     


 


Dobutamine HCl/Dextrose  500 mg in 250 mls @ 17.25 mls/hr  10/06/18 10:47  

10/07/18 00:10





  Dobutamine/Dextrose 5% 500mg/250ml  IV   5 mcg/kg/min





  .[id number] PRN   17.25 mls/hr





  TITRATE PER PROTOCOL   Administration





     





  Protocol   





  5 MCG/KG/MIN   


 


Midazolam 100 mg/100ml in NS  100 mg in 100 mls @ 5 mls/hr  10/06/18 11:35  

10/06/18 15:00





  Midazolam 100 Mg/100ml In Ns  IV   5 mg/hr





  .Q20H PRN   5 mls/hr





  Sedation   Administration





     





  Protocol   





  5 MG/HR   


 


Levetiracetam 1,500 mg/ Sodium  115 mls @ 460 mls/hr  10/06/18 22:00  10/06/18 

22:05





  Chloride  IV   460 mls/hr





  Q12 JIM   Administration





     





     





     





     


 


Insulin Human Regular  0 units  10/06/18 18:00  10/07/18 06:30





  Humulin R Med  SC   Not Given





  Q6H [facility]   





     





     





  Protocol   





     


 


Lisinopril  2.5 mg  10/07/18 10:00  





  Zestril  PO   





  DAILY [facility]   





     





     





     





     


 


Magnesium Oxide  400 mg  10/06/18 10:45  10/06/18 17:36





  Mag-Ox  PO  10/07/18 23:59  400 mg





  BID JIM   Administration





     





     





     





     


 


Pantoprazole Sodium  40 mg  10/04/18 11:30  10/06/18 10:00





  Protonix Inj  IVP   40 mg





  DAILY [facility]   Administration





     





     





     





     


 


Potassium Chloride  40 meq  10/07/18 11:00  





  Potassium Chloride Oral Soln  PO  10/07/18 15:01  





  Q4H [facility]   





     





     





     





     


 


Spironolactone  25 mg  10/07/18 10:00  





  Aldactone  PO   





  BID [facility]   





     





     





     





     














- Patient Studies


Lab Studies: 


                              Microbiology Studies











 10/04/18 23:06 MRSA Culture (Admit) - Final





 Nose    MRSA NOT DETECTED


 


 10/05/18 12:50 Urine Culture - Final





 Urine,Catheterized    No Growth (<1,000 CFU/ML)


 


 10/04/18 12:45 Blood Culture - Preliminary





 Blood-Venous    NO GROWTH AFTER 48 HOURS


 


 10/04/18 12:33 Blood Culture - Preliminary





 Blood-Venous    NO GROWTH AFTER 48 HOURS








                                   Lab Studies











  10/07/18 10/07/18 10/07/18 Range/Units





  06:04 05:30 05:30 


 


WBC    11.8 H  (4.5-11.0)  10^3/ul


 


RBC    4.42  (3.5-6.1)  10^6/uL


 


Hgb    13.1  (12.0-16.0)  g/dL


 


Hct    40.3  (36.0-48.0)  %


 


MCV    91.2  (80.0-105.0)  fl


 


MCH    29.6  (25.0-35.0)  pg


 


MCHC    32.5  (31.0-37.0)  g/dl


 


RDW    15.1 H  (11.5-14.5)  %


 


Plt Count    178  (120.0-450.0)  10^3/uL


 


MPV    12.6 H  (7.0-11.0)  fl


 


Gran %    79.9 H  (50.0-68.0)  %


 


Lymph % (Auto)    10.5 L  (22.0-35.0)  %


 


Mono % (Auto)    9.4 H  (1.0-6.0)  %


 


Eos % (Auto)    0.1 L  (1.5-5.0)  %


 


Baso % (Auto)    0.1  (0.0-3.0)  %


 


Gran #    9.42 H  (1.4-6.5)  


 


Lymph # (Auto)    1.2  (1.2-3.4)  


 


Mono # (Auto)    1.1 H  (0.1-0.6)  


 


Eos # (Auto)    0.0  (0.0-0.7)  


 


Baso # (Auto)    0.01  (0.0-2.0)  K/mm3


 


pCO2     (35-45)  mm/Hg


 


pO2     ()  mm/Hg


 


HCO3     (21-28)  mmol/L


 


ABG pH     (7.35-7.45)  


 


ABG Total CO2     (22-28)  mmol.L


 


ABG O2 Saturation     (95-98)  %


 


ABG O2 Content     (15-23)  ML/dl


 


ABG Base Excess     (-2.0-3.0)  mmol/L


 


ABG Hemoglobin     (11.7-17.4)  g/dL


 


ABG Carboxyhemoglobin     (0.5-1.5)  %


 


POC ABG HHb (Measured)     (0-5)  %


 


ABG Methemoglobin     (0.0-3.0)  %


 


ABG O2 Capacity     (16-24)  mL/dl


 


Hgb O2 Saturation     (95.0-98.0)  %


 


FiO2     %


 


Sodium   139   (132-148)  mmol/L


 


Potassium   3.0 L   (3.6-5.0)  mmol/L


 


Chloride   105   ()  mmol/L


 


Carbon Dioxide   28   (21-33)  mmol/L


 


Anion Gap   9 L   (10-20)  


 


BUN   28 H   (7-21)  mg/dL


 


Creatinine   1.3 H   (0.7-1.2)  mg/dl


 


Est GFR (African Amer)   51   


 


Est GFR (Non-Af Amer)   42   


 


POC Glucose (mg/dL)  121 H    ()  mg/dL


 


Random Glucose   117 H   ()  mg/dL


 


Calcium   8.1 L   (8.4-10.5)  mg/dL


 


Phosphorus   3.8   (2.5-4.5)  mg/dL


 


Magnesium   1.9   (1.7-2.2)  mg/dL


 


Total Bilirubin   0.8   (0.2-1.3)  mg/dL


 


AST   43 H   (14-36)  U/L


 


ALT   32   (7-56)  U/L


 


Alkaline Phosphatase   51   ()  U/L


 


Total Protein   5.9   (5.8-8.3)  g/dL


 


Albumin   3.1   (3.0-4.8)  g/dL


 


Globulin   2.8   gm/dL


 


Albumin/Globulin Ratio   1.1   (1.1-1.8)  














  10/07/18 10/06/18 10/06/18 Range/Units





  05:00 23:19 17:33 


 


WBC     (4.5-11.0)  10^3/ul


 


RBC     (3.5-6.1)  10^6/uL


 


Hgb     (12.0-16.0)  g/dL


 


Hct     (36.0-48.0)  %


 


MCV     (80.0-105.0)  fl


 


MCH     (25.0-35.0)  pg


 


MCHC     (31.0-37.0)  g/dl


 


RDW     (11.5-14.5)  %


 


Plt Count     (120.0-450.0)  10^3/uL


 


MPV     (7.0-11.0)  fl


 


Gran %     (50.0-68.0)  %


 


Lymph % (Auto)     (22.0-35.0)  %


 


Mono % (Auto)     (1.0-6.0)  %


 


Eos % (Auto)     (1.5-5.0)  %


 


Baso % (Auto)     (0.0-3.0)  %


 


Gran #     (1.4-6.5)  


 


Lymph # (Auto)     (1.2-3.4)  


 


Mono # (Auto)     (0.1-0.6)  


 


Eos # (Auto)     (0.0-0.7)  


 


Baso # (Auto)     (0.0-2.0)  K/mm3


 


pCO2  39    (35-45)  mm/Hg


 


pO2  88.0    ()  mm/Hg


 


HCO3  27.1    (21-28)  mmol/L


 


ABG pH  7.45    (7.35-7.45)  


 


ABG Total CO2  28.3 H    (22-28)  mmol.L


 


ABG O2 Saturation  99.2 H    (95-98)  %


 


ABG O2 Content  17.9    (15-23)  ML/dl


 


ABG Base Excess  3.0    (-2.0-3.0)  mmol/L


 


ABG Hemoglobin  13.1    (11.7-17.4)  g/dL


 


ABG Carboxyhemoglobin  1.8 H    (0.5-1.5)  %


 


POC ABG HHb (Measured)  0.8    (0-5)  %


 


ABG Methemoglobin  0.6    (0.0-3.0)  %


 


ABG O2 Capacity  18.0    (16-24)  mL/dl


 


Hgb O2 Saturation  96.8    (95.0-98.0)  %


 


FiO2  60.0    %


 


Sodium     (132-148)  mmol/L


 


Potassium     (3.6-5.0)  mmol/L


 


Chloride     ()  mmol/L


 


Carbon Dioxide     (21-33)  mmol/L


 


Anion Gap     (10-20)  


 


BUN     (7-21)  mg/dL


 


Creatinine     (0.7-1.2)  mg/dl


 


Est GFR (African Amer)     


 


Est GFR (Non-Af Amer)     


 


POC Glucose (mg/dL)   117 H  140 H  ()  mg/dL


 


Random Glucose     ()  mg/dL


 


Calcium     (8.4-10.5)  mg/dL


 


Phosphorus     (2.5-4.5)  mg/dL


 


Magnesium     (1.7-2.2)  mg/dL


 


Total Bilirubin     (0.2-1.3)  mg/dL


 


AST     (14-36)  U/L


 


ALT     (7-56)  U/L


 


Alkaline Phosphatase     ()  U/L


 


Total Protein     (5.8-8.3)  g/dL


 


Albumin     (3.0-4.8)  g/dL


 


Globulin     gm/dL


 


Albumin/Globulin Ratio     (1.1-1.8)  














  10/06/18 10/06/18 Range/Units





  11:08 06:30 


 


WBC    (4.5-11.0)  10^3/ul


 


RBC    (3.5-6.1)  10^6/uL


 


Hgb    (12.0-16.0)  g/dL


 


Hct    (36.0-48.0)  %


 


MCV    (80.0-105.0)  fl


 


MCH    (25.0-35.0)  pg


 


MCHC    (31.0-37.0)  g/dl


 


RDW    (11.5-14.5)  %


 


Plt Count    (120.0-450.0)  10^3/uL


 


MPV    (7.0-11.0)  fl


 


Gran %    (50.0-68.0)  %


 


Lymph % (Auto)    (22.0-35.0)  %


 


Mono % (Auto)    (1.0-6.0)  %


 


Eos % (Auto)    (1.5-5.0)  %


 


Baso % (Auto)    (0.0-3.0)  %


 


Gran #    (1.4-6.5)  


 


Lymph # (Auto)    (1.2-3.4)  


 


Mono # (Auto)    (0.1-0.6)  


 


Eos # (Auto)    (0.0-0.7)  


 


Baso # (Auto)    (0.0-2.0)  K/mm3


 


pCO2    (35-45)  mm/Hg


 


pO2    ()  mm/Hg


 


HCO3    (21-28)  mmol/L


 


ABG pH    (7.35-7.45)  


 


ABG Total CO2    (22-28)  mmol.L


 


ABG O2 Saturation    (95-98)  %


 


ABG O2 Content    (15-23)  ML/dl


 


ABG Base Excess    (-2.0-3.0)  mmol/L


 


ABG Hemoglobin    (11.7-17.4)  g/dL


 


ABG Carboxyhemoglobin    (0.5-1.5)  %


 


POC ABG HHb (Measured)    (0-5)  %


 


ABG Methemoglobin    (0.0-3.0)  %


 


ABG O2 Capacity    (16-24)  mL/dl


 


Hgb O2 Saturation    (95.0-98.0)  %


 


FiO2    %


 


Sodium   136  (132-148)  mmol/L


 


Potassium   3.5 L  (3.6-5.0)  mmol/L


 


Chloride   105  ()  mmol/L


 


Carbon Dioxide   18 L  (21-33)  mmol/L


 


Anion Gap   17  (10-20)  


 


BUN   22 H  (7-21)  mg/dL


 


Creatinine   1.0  (0.7-1.2)  mg/dl


 


Est GFR (African Amer)   > 60  


 


Est GFR (Non-Af Amer)   57  


 


POC Glucose (mg/dL)  152 H   ()  mg/dL


 


Random Glucose   132 H  ()  mg/dL


 


Calcium   8.2 L  (8.4-10.5)  mg/dL


 


Phosphorus   4.8 H  (2.5-4.5)  mg/dL


 


Magnesium   1.6 L  (1.7-2.2)  mg/dL


 


Total Bilirubin   1.0  (0.2-1.3)  mg/dL


 


AST   47 H D  (14-36)  U/L


 


ALT   35  (7-56)  U/L


 


Alkaline Phosphatase   51  ()  U/L


 


Total Protein   6.1  (5.8-8.3)  g/dL


 


Albumin   3.1  (3.0-4.8)  g/dL


 


Globulin   2.9  gm/dL


 


Albumin/Globulin Ratio   1.1  (1.1-1.8)  








                         Laboratory Results - last 24 hr











  10/06/18 10/06/18 10/06/18





  06:30 11:08 17:33


 


WBC   


 


RBC   


 


Hgb   


 


Hct   


 


MCV   


 


MCH   


 


MCHC   


 


RDW   


 


Plt Count   


 


MPV   


 


Gran %   


 


Lymph % (Auto)   


 


Mono % (Auto)   


 


Eos % (Auto)   


 


Baso % (Auto)   


 


Gran #   


 


Lymph # (Auto)   


 


Mono # (Auto)   


 


Eos # (Auto)   


 


Baso # (Auto)   


 


pCO2   


 


pO2   


 


HCO3   


 


ABG pH   


 


ABG Total CO2   


 


ABG O2 Saturation   


 


ABG O2 Content   


 


ABG Base Excess   


 


ABG Hemoglobin   


 


ABG Carboxyhemoglobin   


 


POC ABG HHb (Measured)   


 


ABG Methemoglobin   


 


ABG O2 Capacity   


 


Hgb O2 Saturation   


 


FiO2   


 


Sodium  136  


 


Potassium  3.5 L  


 


Chloride  105  


 


Carbon Dioxide  18 L  


 


Anion Gap  17  


 


BUN  22 H  


 


Creatinine  1.0  


 


Est GFR ( Amer)  > 60  


 


Est GFR (Non-Af Amer)  57  


 


POC Glucose (mg/dL)   152 H  140 H


 


Random Glucose  132 H  


 


Calcium  8.2 L  


 


Phosphorus  4.8 H  


 


Magnesium  1.6 L  


 


Total Bilirubin  1.0  


 


AST  47 H D  


 


ALT  35  


 


Alkaline Phosphatase  51  


 


Total Protein  6.1  


 


Albumin  3.1  


 


Globulin  2.9  


 


Albumin/Globulin Ratio  1.1  














  10/06/18 10/07/18 10/07/18





  23:19 05:00 05:30


 


WBC    11.8 H


 


RBC    4.42


 


Hgb    13.1


 


Hct    40.3


 


MCV    91.2


 


MCH    29.6


 


MCHC    32.5


 


RDW    15.1 H


 


Plt Count    178


 


MPV    12.6 H


 


Gran %    79.9 H


 


Lymph % (Auto)    10.5 L


 


Mono % (Auto)    9.4 H


 


Eos % (Auto)    0.1 L


 


Baso % (Auto)    0.1


 


Gran #    9.42 H


 


Lymph # (Auto)    1.2


 


Mono # (Auto)    1.1 H


 


Eos # (Auto)    0.0


 


Baso # (Auto)    0.01


 


pCO2   39 


 


pO2   88.0 


 


HCO3   27.1 


 


ABG pH   7.45 


 


ABG Total CO2   28.3 H 


 


ABG O2 Saturation   99.2 H 


 


ABG O2 Content   17.9 


 


ABG Base Excess   3.0 


 


ABG Hemoglobin   13.1 


 


ABG Carboxyhemoglobin   1.8 H 


 


POC ABG HHb (Measured)   0.8 


 


ABG Methemoglobin   0.6 


 


ABG O2 Capacity   18.0 


 


Hgb O2 Saturation   96.8 


 


FiO2   60.0 


 


Sodium   


 


Potassium   


 


Chloride   


 


Carbon Dioxide   


 


Anion Gap   


 


BUN   


 


Creatinine   


 


Est GFR ( Amer)   


 


Est GFR (Non-Af Amer)   


 


POC Glucose (mg/dL)  117 H  


 


Random Glucose   


 


Calcium   


 


Phosphorus   


 


Magnesium   


 


Total Bilirubin   


 


AST   


 


ALT   


 


Alkaline Phosphatase   


 


Total Protein   


 


Albumin   


 


Globulin   


 


Albumin/Globulin Ratio   














  10/07/18 10/07/18





  05:30 06:04


 


WBC  


 


RBC  


 


Hgb  


 


Hct  


 


MCV  


 


MCH  


 


MCHC  


 


RDW  


 


Plt Count  


 


MPV  


 


Gran %  


 


Lymph % (Auto)  


 


Mono % (Auto)  


 


Eos % (Auto)  


 


Baso % (Auto)  


 


Gran #  


 


Lymph # (Auto)  


 


Mono # (Auto)  


 


Eos # (Auto)  


 


Baso # (Auto)  


 


pCO2  


 


pO2  


 


HCO3  


 


ABG pH  


 


ABG Total CO2  


 


ABG O2 Saturation  


 


ABG O2 Content  


 


ABG Base Excess  


 


ABG Hemoglobin  


 


ABG Carboxyhemoglobin  


 


POC ABG HHb (Measured)  


 


ABG Methemoglobin  


 


ABG O2 Capacity  


 


Hgb O2 Saturation  


 


FiO2  


 


Sodium  139 


 


Potassium  3.0 L 


 


Chloride  105 


 


Carbon Dioxide  28 


 


Anion Gap  9 L 


 


BUN  28 H 


 


Creatinine  1.3 H 


 


Est GFR ( Amer)  51 


 


Est GFR (Non-Af Amer)  42 


 


POC Glucose (mg/dL)   121 H


 


Random Glucose  117 H 


 


Calcium  8.1 L 


 


Phosphorus  3.8 


 


Magnesium  1.9 


 


Total Bilirubin  0.8 


 


AST  43 H 


 


ALT  32 


 


Alkaline Phosphatase  51 


 


Total Protein  5.9 


 


Albumin  3.1 


 


Globulin  2.8 


 


Albumin/Globulin Ratio  1.1 











Fingerstick Blood Sugar Results: 121





Review of Systems





- Review of Systems


Systems not reviewed;Unavailable: Intubated





Critical Care Progress Note





- Ventilator Checklist


Head of Bed 30 Degrees: Yes


Daily Sedation Vacation: Yes


Daily Assessment of Readiness to Wean: Yes


Daily Spontaneous Breathing Trial: Yes


PUD Prophalyxis: Yes


DVT Prophylaxis: Yes





- Vent Settings


MODE:: PRVC





- Extremities/Vascular


Does the Patient have a Central Venous Catheter?: No


Does the Patient need a Central Venous Catheter?: No


Does the Patient have a Williamson Catheter?: Yes


Does the Patient need a Williamson Catheter?: Yes





- Prophylaxis GI


Prophylaxis GI: PPI





- Prophylaxis DVT


Prophylaxis DVT: Heparin SQ





Assessment/Plan





- Assessment and Plan (Free Text)


Assessment: 





57 yo F with PMH of DM2, HTN, COPD, and chronic AFib presented in [facility] via EMS 

with ROSC achieved after 2 minutes of CPR and one dose of epinephrine. Patient 

had spontaneous, seizure-like movements yesterday. Per neurology, EEG activity 

is consistent with severe anoxic brain injury with poor prognosis. 





Plan: 








Neuro:


-Patient is currently non-responsive to sternal rub, trapezius pinch


-No gag, pupillary, corneal, or doll's eyes reflexes


-EEG is consistent with severe anoxic brain injury


-Poor prognosis


-Repeat CT head without acute findings


-Discussed results and prognosis with family yesterday


-Plan is to observe for another 24 hours


-Keppra 1500 mg BID for seizure ppx


-F/u further neurology recs





Cardio:


-Cardiac arrest likely 2/2 arrhythmia, hx of AFib


-Currently RRR, normotensive, no longer requiring pressure support


-PCI shortly after arrival in ED showed no significant stenosis, no stents were 

placed


-LVEF found to be between 15-20% on cath


-Amiodarone, coreg, and dobutamine per cardiology recs


-F/u cardiology recs





Pulm:


-Intubated and sedated with versed alone


-CTA b/l


-CXR improved with less edema


-Vent Settings:  RR 15 PEEP 15 O2 50


-Protective lung ventilation strategy


-Keep head of bed elevated to 30 degrees


-Aspiration pxns


-Stop daily abg, cxr





GI:


-NPO on vent


-NG in place, no suction needed, may use for meds


-Consider tube feedings if needed





/Nephro:


-BUN/Cr stable


-UOP > 4 L yesterday with BID lasix


-Continue to monitor


-Maintain euvolemia





Endocrine:


-Maintain euglycemia


-Patient has hx of DM2


-ISS as needed





ID:


-Leukocytosis down trending


-On empiric cefepime and flagyl 


-ID following, recs appreciated





Heme/Onc:


-H/H stable at 13.1/40.3


-No signs of HD compromise 


-Continue monitoring H/H 





DVT/GI PPX: Protonix and SC heparin


Full Code


Monitor in MICU





Case and plan reviewed and discussed with my attending Dr. Benjamin Neely, DO


[facility] Resident PGY-1





<Christofer Alexander - Last Filed: 10/07/18 12:30>





CCU Objective





- Vital Signs / Intake & Output


Vital Signs (Last 4 hours): 


Vital Signs











  Temp Pulse BP Pulse Ox


 


 10/07/18 10:47    150/80 


 


 10/07/18 10:46   84  150/80 


 


 10/07/18 08:45    145/78 


 


 10/07/18 08:44  97.0 F L  75   96


 


 10/07/18 08:40  97.0 F L  79   96


 


 10/07/18 08:30  97.0 F L  79  157/77 H  95











Intake and Output (Last 8hrs): 


                                 Intake & Output











 10/06/18 10/07/18 10/07/18





 22:59 06:59 14:59


 


Intake Total 958 1464 100


 


Output Total 1150 750 


 


Balance -192 714 100


 


Weight  245 lb 11.2 oz 


 


Intake:   


 


   230 100


 


    Right Antecubital 938  


 


  Oral  0 


 


  Tube Feeding  0 


 


  TPN/PPN  0 


 


  Blood Product  0 


 


  Lipid  0 


 


  Albumin  0 


 


  Other  1234 


 


Output:   


 


  Gastric Amount  50 


 


    Left Nares  50 


 


  Urine 1150 700 


 


    2-way Urethral 1150 700 


 


  Stool  0 


 


  Urine/Stool Mix  0 


 


  Emesis  0 


 


  Oral Regurgitation  0 


 


  Other  0 


 


Other:   


 


  # Voids   


 


    2-way Urethral  0 


 


  # Bowel Movements 0 0 














- Medications


Active Medications: 


Active Medications











Generic Name Dose Route Start Last Admin





  Trade Name Michaelq  PRN Reason Stop Dose Admin


 


Amiodarone HCl  400 mg  10/05/18 10:00  10/07/18 10:46





  Cordarone  PO  10/07/18 23:59  400 mg





  TID JIM   Administration





     





     





     





     


 


Amiodarone HCl  200 mg  10/08/18 10:00  





  Cordarone  PO   





  DAILY JIM   





     





     





     





     


 


Aspirin  81 mg  10/05/18 06:00  10/07/18 10:45





  Aspirin Chewable  NG   81 mg





  DAILY JIM   Administration





     





     





     





     


 


Atorvastatin Calcium  80 mg  10/04/18 17:00  10/06/18 17:34





  Lipitor  NG   80 mg





  DIN JIM   Administration





     





     





     





     


 


Carvedilol  3.125 mg  10/07/18 10:00  





  Coreg  PO   





  BID JIM   





     





     





     





     


 


Furosemide  40 mg  10/07/18 10:00  10/07/18 10:47





  Lasix  IVP   40 mg





  DAILY JIM   Administration





     





     





     





     


 


Gemfibrozil  600 mg  10/05/18 10:15  10/07/18 10:45





  Lopid  PO   600 mg





  BID JIM   Administration





     





     





     





     


 


Heparin Sodium (Porcine)  5,000 units  10/04/18 20:00  10/07/18 05:49





  Heparin  SC   5,000 units





  Q8 JIM   Administration





     





     





  Protocol   





     


 


Heparin Sodium/Sodium Chloride  25,000 units in 250 mls @ 15.241 mls/hr  10/04

/18 11:30  10/04/18 11:30





  Heparin 64218 Units/250ml 1/2 Normal Saline  IV   Not Given





  .[id number] JIM   





     





     





  Protocol   





  12 UNITS/KG/HR   


 


Cisatracurium Besylate 200 mg/  270 mls @ 4.66 mls/hr  10/04/18 16:05  10/06/18 

07:30





  Sodium Chloride  IV   0 mcg/kg/min





  .Q24H PRN   0 mls/hr





  TITRATE PER MD ORDER   Titration





     





  Protocol   





  0.5 MCG/KG/MIN   


 


Doxycycline Hyclate 100 mg/  100 mls @ 100 mls/hr  10/04/18 22:00  10/06/18 

22:06





  Sodium Chloride  IVPB   100 mls/hr





  Q12 JIM   Administration





     





     





  Protocol   





     


 


Metronidazole  500 mg in 100 mls @ 100 mls/hr  10/04/18 22:00  10/07/18 05:49





  Flagyl  IVPB   100 mls/hr





  Q8 JIM   Administration





     





     





  Protocol   





     


 


Cefepime HCl  2 gm in 100 mls @ 100 mls/hr  10/05/18 08:00  10/07/18 05:51





  Maxipime 2gm  IVPB  10/10/18 08:01  100 mls/hr





  Q8 JIM   Administration





     





     





  Protocol   





     


 


Dobutamine HCl/Dextrose  500 mg in 250 mls @ 17.25 mls/hr  10/06/18 10:47  

10/07/18 00:10





  Dobutamine/Dextrose 5% 500mg/250ml  IV   5 mcg/kg/min





  .[id number] PRN   17.25 mls/hr





  TITRATE PER PROTOCOL   Administration





     





  Protocol   





  5 MCG/KG/MIN   


 


Midazolam 100 mg/100ml in NS  100 mg in 100 mls @ 5 mls/hr  10/06/18 11:35  

10/07/18 08:00





  Midazolam 100 Mg/100ml In Ns  IV   5 mg/hr





  .Q20H PRN   5 mls/hr





  Sedation   Administration





     





  Protocol   





  5 MG/HR   


 


Levetiracetam 1,500 mg/ Sodium  115 mls @ 460 mls/hr  10/06/18 22:00  10/07/18 

10:45





  Chloride  IV   460 mls/hr





  Q12 JIM   Administration





     





     





     





     


 


Insulin Human Regular  0 units  10/06/18 18:00  10/07/18 06:30





  Humulin R Med  SC   Not Given





  Q6H [facility]   





     





     





  Protocol   





     


 


Lisinopril  2.5 mg  10/07/18 10:00  





  Zestril  PO   





  DAILY JIM   





     





     





     





     


 


Magnesium Oxide  400 mg  10/06/18 10:45  10/07/18 10:45





  Mag-Ox  PO  10/07/18 23:59  400 mg





  BID JIM   Administration





     





     





     





     


 


Pantoprazole Sodium  40 mg  10/04/18 11:30  10/07/18 10:58





  Protonix Inj  IVP   40 mg





  DAILY JIM   Administration





     





     





     





     


 


Potassium Chloride  40 meq  10/07/18 11:00  





  Potassium Chloride Oral Soln  PO  10/07/18 15:01  





  Q4H JIM   





     





     





     





     


 


Spironolactone  25 mg  10/07/18 10:00  





  Aldactone  PO   





  BID JIM   





     





     





     





     














- Patient Studies


Lab Studies: 


                              Microbiology Studies











 10/04/18 23:00 Gram Stain - Final





 Trachasp Sputum Culture - Final





    NORMAL ORAL KRISTY


 


 10/04/18 23:06 MRSA Culture (Admit) - Final





 Nose    MRSA NOT DETECTED


 


 10/05/18 12:50 Urine Culture - Final





 Urine,Catheterized    No Growth (<1,000 CFU/ML)


 


 10/04/18 12:45 Blood Culture - Preliminary





 Blood-Venous    NO GROWTH AFTER 48 HOURS


 


 10/04/18 12:33 Blood Culture - Preliminary





 Blood-Venous    NO GROWTH AFTER 48 HOURS








                                   Lab Studies











  10/07/18 10/07/18 10/07/18 Range/Units





  11:57 06:04 05:30 


 


WBC     (4.5-11.0)  10^3/ul


 


RBC     (3.5-6.1)  10^6/uL


 


Hgb     (12.0-16.0)  g/dL


 


Hct     (36.0-48.0)  %


 


MCV     (80.0-105.0)  fl


 


MCH     (25.0-35.0)  pg


 


MCHC     (31.0-37.0)  g/dl


 


RDW     (11.5-14.5)  %


 


Plt Count     (120.0-450.0)  10^3/uL


 


MPV     (7.0-11.0)  fl


 


Gran %     (50.0-68.0)  %


 


Lymph % (Auto)     (22.0-35.0)  %


 


Mono % (Auto)     (1.0-6.0)  %


 


Eos % (Auto)     (1.5-5.0)  %


 


Baso % (Auto)     (0.0-3.0)  %


 


Gran #     (1.4-6.5)  


 


Lymph # (Auto)     (1.2-3.4)  


 


Mono # (Auto)     (0.1-0.6)  


 


Eos # (Auto)     (0.0-0.7)  


 


Baso # (Auto)     (0.0-2.0)  K/mm3


 


pCO2     (35-45)  mm/Hg


 


pO2     ()  mm/Hg


 


HCO3     (21-28)  mmol/L


 


ABG pH     (7.35-7.45)  


 


ABG Total CO2     (22-28)  mmol.L


 


ABG O2 Saturation     (95-98)  %


 


ABG O2 Content     (15-23)  ML/dl


 


ABG Base Excess     (-2.0-3.0)  mmol/L


 


ABG Hemoglobin     (11.7-17.4)  g/dL


 


ABG Carboxyhemoglobin     (0.5-1.5)  %


 


POC ABG HHb (Measured)     (0-5)  %


 


ABG Methemoglobin     (0.0-3.0)  %


 


ABG O2 Capacity     (16-24)  mL/dl


 


Hgb O2 Saturation     (95.0-98.0)  %


 


FiO2     %


 


Sodium    139  (132-148)  mmol/L


 


Potassium    3.0 L  (3.6-5.0)  mmol/L


 


Chloride    105  ()  mmol/L


 


Carbon Dioxide    28  (21-33)  mmol/L


 


Anion Gap    9 L  (10-20)  


 


BUN    28 H  (7-21)  mg/dL


 


Creatinine    1.3 H  (0.7-1.2)  mg/dl


 


Est GFR (African Amer)    51  


 


Est GFR (Non-Af Amer)    42  


 


POC Glucose (mg/dL)  132 H  121 H   ()  mg/dL


 


Random Glucose    117 H  ()  mg/dL


 


Calcium    8.1 L  (8.4-10.5)  mg/dL


 


Phosphorus    3.8  (2.5-4.5)  mg/dL


 


Magnesium    1.9  (1.7-2.2)  mg/dL


 


Total Bilirubin    0.8  (0.2-1.3)  mg/dL


 


AST    43 H  (14-36)  U/L


 


ALT    32  (7-56)  U/L


 


Alkaline Phosphatase    51  ()  U/L


 


Total Protein    5.9  (5.8-8.3)  g/dL


 


Albumin    3.1  (3.0-4.8)  g/dL


 


Globulin    2.8  gm/dL


 


Albumin/Globulin Ratio    1.1  (1.1-1.8)  














  10/07/18 10/07/18 10/06/18 Range/Units





  05:30 05:00 23:19 


 


WBC  11.8 H    (4.5-11.0)  10^3/ul


 


RBC  4.42    (3.5-6.1)  10^6/uL


 


Hgb  13.1    (12.0-16.0)  g/dL


 


Hct  40.3    (36.0-48.0)  %


 


MCV  91.2    (80.0-105.0)  fl


 


MCH  29.6    (25.0-35.0)  pg


 


MCHC  32.5    (31.0-37.0)  g/dl


 


RDW  15.1 H    (11.5-14.5)  %


 


Plt Count  178    (120.0-450.0)  10^3/uL


 


MPV  12.6 H    (7.0-11.0)  fl


 


Gran %  79.9 H    (50.0-68.0)  %


 


Lymph % (Auto)  10.5 L    (22.0-35.0)  %


 


Mono % (Auto)  9.4 H    (1.0-6.0)  %


 


Eos % (Auto)  0.1 L    (1.5-5.0)  %


 


Baso % (Auto)  0.1    (0.0-3.0)  %


 


Gran #  9.42 H    (1.4-6.5)  


 


Lymph # (Auto)  1.2    (1.2-3.4)  


 


Mono # (Auto)  1.1 H    (0.1-0.6)  


 


Eos # (Auto)  0.0    (0.0-0.7)  


 


Baso # (Auto)  0.01    (0.0-2.0)  K/mm3


 


pCO2   39   (35-45)  mm/Hg


 


pO2   88.0   ()  mm/Hg


 


HCO3   27.1   (21-28)  mmol/L


 


ABG pH   7.45   (7.35-7.45)  


 


ABG Total CO2   28.3 H   (22-28)  mmol.L


 


ABG O2 Saturation   99.2 H   (95-98)  %


 


ABG O2 Content   17.9   (15-23)  ML/dl


 


ABG Base Excess   3.0   (-2.0-3.0)  mmol/L


 


ABG Hemoglobin   13.1   (11.7-17.4)  g/dL


 


ABG Carboxyhemoglobin   1.8 H   (0.5-1.5)  %


 


POC ABG HHb (Measured)   0.8   (0-5)  %


 


ABG Methemoglobin   0.6   (0.0-3.0)  %


 


ABG O2 Capacity   18.0   (16-24)  mL/dl


 


Hgb O2 Saturation   96.8   (95.0-98.0)  %


 


FiO2   60.0   %


 


Sodium     (132-148)  mmol/L


 


Potassium     (3.6-5.0)  mmol/L


 


Chloride     ()  mmol/L


 


Carbon Dioxide     (21-33)  mmol/L


 


Anion Gap     (10-20)  


 


BUN     (7-21)  mg/dL


 


Creatinine     (0.7-1.2)  mg/dl


 


Est GFR (African Amer)     


 


Est GFR (Non-Af Amer)     


 


POC Glucose (mg/dL)    117 H  ()  mg/dL


 


Random Glucose     ()  mg/dL


 


Calcium     (8.4-10.5)  mg/dL


 


Phosphorus     (2.5-4.5)  mg/dL


 


Magnesium     (1.7-2.2)  mg/dL


 


Total Bilirubin     (0.2-1.3)  mg/dL


 


AST     (14-36)  U/L


 


ALT     (7-56)  U/L


 


Alkaline Phosphatase     ()  U/L


 


Total Protein     (5.8-8.3)  g/dL


 


Albumin     (3.0-4.8)  g/dL


 


Globulin     gm/dL


 


Albumin/Globulin Ratio     (1.1-1.8)  














  10/06/18 10/06/18 Range/Units





  17:33 06:30 


 


WBC    (4.5-11.0)  10^3/ul


 


RBC    (3.5-6.1)  10^6/uL


 


Hgb    (12.0-16.0)  g/dL


 


Hct    (36.0-48.0)  %


 


MCV    (80.0-105.0)  fl


 


MCH    (25.0-35.0)  pg


 


MCHC    (31.0-37.0)  g/dl


 


RDW    (11.5-14.5)  %


 


Plt Count    (120.0-450.0)  10^3/uL


 


MPV    (7.0-11.0)  fl


 


Gran %    (50.0-68.0)  %


 


Lymph % (Auto)    (22.0-35.0)  %


 


Mono % (Auto)    (1.0-6.0)  %


 


Eos % (Auto)    (1.5-5.0)  %


 


Baso % (Auto)    (0.0-3.0)  %


 


Gran #    (1.4-6.5)  


 


Lymph # (Auto)    (1.2-3.4)  


 


Mono # (Auto)    (0.1-0.6)  


 


Eos # (Auto)    (0.0-0.7)  


 


Baso # (Auto)    (0.0-2.0)  K/mm3


 


pCO2    (35-45)  mm/Hg


 


pO2    ()  mm/Hg


 


HCO3    (21-28)  mmol/L


 


ABG pH    (7.35-7.45)  


 


ABG Total CO2    (22-28)  mmol.L


 


ABG O2 Saturation    (95-98)  %


 


ABG O2 Content    (15-23)  ML/dl


 


ABG Base Excess    (-2.0-3.0)  mmol/L


 


ABG Hemoglobin    (11.7-17.4)  g/dL


 


ABG Carboxyhemoglobin    (0.5-1.5)  %


 


POC ABG HHb (Measured)    (0-5)  %


 


ABG Methemoglobin    (0.0-3.0)  %


 


ABG O2 Capacity    (16-24)  mL/dl


 


Hgb O2 Saturation    (95.0-98.0)  %


 


FiO2    %


 


Sodium   136  (132-148)  mmol/L


 


Potassium   3.5 L  (3.6-5.0)  mmol/L


 


Chloride   105  ()  mmol/L


 


Carbon Dioxide   18 L  (21-33)  mmol/L


 


Anion Gap   17  (10-20)  


 


BUN   22 H  (7-21)  mg/dL


 


Creatinine   1.0  (0.7-1.2)  mg/dl


 


Est GFR (African Amer)   > 60  


 


Est GFR (Non-Af Amer)   57  


 


POC Glucose (mg/dL)  140 H   ()  mg/dL


 


Random Glucose   132 H  ()  mg/dL


 


Calcium   8.2 L  (8.4-10.5)  mg/dL


 


Phosphorus   4.8 H  (2.5-4.5)  mg/dL


 


Magnesium   1.6 L  (1.7-2.2)  mg/dL


 


Total Bilirubin   1.0  (0.2-1.3)  mg/dL


 


AST   47 H D  (14-36)  U/L


 


ALT   35  (7-56)  U/L


 


Alkaline Phosphatase   51  ()  U/L


 


Total Protein   6.1  (5.8-8.3)  g/dL


 


Albumin   3.1  (3.0-4.8)  g/dL


 


Globulin   2.9  gm/dL


 


Albumin/Globulin Ratio   1.1  (1.1-1.8)  








                         Laboratory Results - last 24 hr











  10/06/18 10/06/18 10/06/18





  06:30 17:33 23:19


 


WBC   


 


RBC   


 


Hgb   


 


Hct   


 


MCV   


 


MCH   


 


MCHC   


 


RDW   


 


Plt Count   


 


MPV   


 


Gran %   


 


Lymph % (Auto)   


 


Mono % (Auto)   


 


Eos % (Auto)   


 


Baso % (Auto)   


 


Gran #   


 


Lymph # (Auto)   


 


Mono # (Auto)   


 


Eos # (Auto)   


 


Baso # (Auto)   


 


pCO2   


 


pO2   


 


HCO3   


 


ABG pH   


 


ABG Total CO2   


 


ABG O2 Saturation   


 


ABG O2 Content   


 


ABG Base Excess   


 


ABG Hemoglobin   


 


ABG Carboxyhemoglobin   


 


POC ABG HHb (Measured)   


 


ABG Methemoglobin   


 


ABG O2 Capacity   


 


Hgb O2 Saturation   


 


FiO2   


 


Sodium  136  


 


Potassium  3.5 L  


 


Chloride  105  


 


Carbon Dioxide  18 L  


 


Anion Gap  17  


 


BUN  22 H  


 


Creatinine  1.0  


 


Est GFR ( Amer)  > 60  


 


Est GFR (Non-Af Amer)  57  


 


POC Glucose (mg/dL)   140 H  117 H


 


Random Glucose  132 H  


 


Calcium  8.2 L  


 


Phosphorus  4.8 H  


 


Magnesium  1.6 L  


 


Total Bilirubin  1.0  


 


AST  47 H D  


 


ALT  35  


 


Alkaline Phosphatase  51  


 


Total Protein  6.1  


 


Albumin  3.1  


 


Globulin  2.9  


 


Albumin/Globulin Ratio  1.1  














  10/07/18 10/07/18 10/07/18





  05:00 05:30 05:30


 


WBC   11.8 H 


 


RBC   4.42 


 


Hgb   13.1 


 


Hct   40.3 


 


MCV   91.2 


 


MCH   29.6 


 


MCHC   32.5 


 


RDW   15.1 H 


 


Plt Count   178 


 


MPV   12.6 H 


 


Gran %   79.9 H 


 


Lymph % (Auto)   10.5 L 


 


Mono % (Auto)   9.4 H 


 


Eos % (Auto)   0.1 L 


 


Baso % (Auto)   0.1 


 


Gran #   9.42 H 


 


Lymph # (Auto)   1.2 


 


Mono # (Auto)   1.1 H 


 


Eos # (Auto)   0.0 


 


Baso # (Auto)   0.01 


 


pCO2  39  


 


pO2  88.0  


 


HCO3  27.1  


 


ABG pH  7.45  


 


ABG Total CO2  28.3 H  


 


ABG O2 Saturation  99.2 H  


 


ABG O2 Content  17.9  


 


ABG Base Excess  3.0  


 


ABG Hemoglobin  13.1  


 


ABG Carboxyhemoglobin  1.8 H  


 


POC ABG HHb (Measured)  0.8  


 


ABG Methemoglobin  0.6  


 


ABG O2 Capacity  18.0  


 


Hgb O2 Saturation  96.8  


 


FiO2  60.0  


 


Sodium    139


 


Potassium    3.0 L


 


Chloride    105


 


Carbon Dioxide    28


 


Anion Gap    9 L


 


BUN    28 H


 


Creatinine    1.3 H


 


Est GFR ( Amer)    51


 


Est GFR (Non-Af Amer)    42


 


POC Glucose (mg/dL)   


 


Random Glucose    117 H


 


Calcium    8.1 L


 


Phosphorus    3.8


 


Magnesium    1.9


 


Total Bilirubin    0.8


 


AST    43 H


 


ALT    32


 


Alkaline Phosphatase    51


 


Total Protein    5.9


 


Albumin    3.1


 


Globulin    2.8


 


Albumin/Globulin Ratio    1.1














  10/07/18 10/07/18





  06:04 11:57


 


WBC  


 


RBC  


 


Hgb  


 


Hct  


 


MCV  


 


MCH  


 


MCHC  


 


RDW  


 


Plt Count  


 


MPV  


 


Gran %  


 


Lymph % (Auto)  


 


Mono % (Auto)  


 


Eos % (Auto)  


 


Baso % (Auto)  


 


Gran #  


 


Lymph # (Auto)  


 


Mono # (Auto)  


 


Eos # (Auto)  


 


Baso # (Auto)  


 


pCO2  


 


pO2  


 


HCO3  


 


ABG pH  


 


ABG Total CO2  


 


ABG O2 Saturation  


 


ABG O2 Content  


 


ABG Base Excess  


 


ABG Hemoglobin  


 


ABG Carboxyhemoglobin  


 


POC ABG HHb (Measured)  


 


ABG Methemoglobin  


 


ABG O2 Capacity  


 


Hgb O2 Saturation  


 


FiO2  


 


Sodium  


 


Potassium  


 


Chloride  


 


Carbon Dioxide  


 


Anion Gap  


 


BUN  


 


Creatinine  


 


Est GFR ( Amer)  


 


Est GFR (Non-Af Amer)  


 


POC Glucose (mg/dL)  121 H  132 H


 


Random Glucose  


 


Calcium  


 


Phosphorus  


 


Magnesium  


 


Total Bilirubin  


 


AST  


 


ALT  


 


Alkaline Phosphatase  


 


Total Protein  


 


Albumin  


 


Globulin  


 


Albumin/Globulin Ratio  














Assessment/Plan





- Assessment and Plan (Free Text)


Plan: 


Patient seen and examined on rounds with resident, agree with note with 

following additions/exceptions:


Patient is 57yo female with PMHx of DM2, HTN, COPD, and chronic AFib, mobird 

obesity, presented s/p PEA cardiac arrest in the field, unclear down time, and 

CPR time, intubated


Patient had cardiac cath done, NICM, EF 25%, no stents placed


CT PE protocol negative for PE


Currently intubated sedated, on Versed, underwent hypothermic protocol


Cardiology, Neurology following, ID following


Labs, imaging, chart reviewed


CXR today with improvement in pulmonary edema


VEEG today read as seizure activity, adjusted AED accordingly, increased Versed 

drip


Exam is consistent with no cortical function, and minimal brainstem function


Family meeting yesterday held with neurology and family, updates and information

provided, family deciding next course of action





Cardiac Arrest


NICM


DM


HTN


COPD


Afib


Morbid Obesity


Pulm edema





Recommend:


- cont with vent support, low tidal volume ventilation,daily ABG, CXR


- Lasix 40mg IV BID


- Cont with Dobutamine drip


- Amio PO


- follow up ECHO


- follow up cardiology


- Versed, Fentanyl


- increase dose of Keppra 1.5g BID


- follow neurology, VEEG


- Adequate sedation


- FS control


- GI ppx


- DVT ppx


- Monitor in MICU





Prognosis is extremely poor








Critical care time 35 minutes

## 2018-10-07 NOTE — PN
DATE:  10/07/2018



REASON FOR THE CONSULTATION AND FOLLOWUP:  Status post code STEMI, status

post collapse, ventricular fibrillation arrest, status post intubated, rule

out anoxic encephalopathy, status post cardiac catheterization,

nonobstructive coronary artery disease, nonischemic cardiomyopathy.



SUBJECTIVE:  The patient remains intubated.  No significant pharyngeal gag

reflux.



OBJECTIVE:

GENERAL:  Not in any apparent distress, being intubated.  Going for head

CT.

VITAL SIGNS:  Temperature afebrile, heart rate 75, blood pressure 145/78.

HEENT:  PERRLA.  Extraocular muscles intact.

NECK:  Supple.  No carotid bruit.  No thyromegaly.

CHEST:  Clear to auscultation.

HEART:  S1 and S2 regular.

ABDOMEN:  Soft.

EXTREMITIES:  Clubbing and cyanosis negative.



LABORATORY DATA:  Blood workup as follows:  WBC 11.8, hemoglobin 13.1,

hematocrit of 40.3, platelet count 178.  Chemistry shows sodium 139,

potassium 3, chloride 105, carbon dioxide 28, anion gap of 9, BUN 28,

creatinine 1.3.



IMPRESSION:  This is a 58-year-old female with past medical history

significant for cardiomyopathy, admitted after having collapse, ventricular

fibrillation arrest, status post intubated.  _____ code ST-elevation

myocardial infarction was called after CAT scan of the head done.  The

patient was brought to the cath lab and had cardiac catheterization,

nonobstructive coronary artery disease, limited only to the mid circumflex,

60-70% stenosis.  Nonischemic cardiomyopathy.  The patient is poorly

responding to the painful stimuli, possibly anoxic encephalopathy.



RECOMMENDATIONS:  Supplement potassium.  Continue low dose of Dobutrex

because the ejection fraction severely decreased.  Continue p.o.

amiodarone.  Continue deep venous thrombosis prophylaxis.  Continue gentle

diuretics.  We will put Coreg and supplement potassium.  Overall, the

patient's condition is critical.  Long-term prognosis is extremely poor. 

Follow up with neurologist.  We will add heart failure therapy.  If the

patient is okay, we will add Coreg, spironolactone as well as low doses of

ACE inhibitors as blood pressure, heart rate and renal function is

tolerated.  Discussed with the family.  We will cut down the Lasix to once

a day.  Magnesium is 1.9.  Overall, as mentioned, the patient's condition

is critical.  Long-term prognosis is extremely poor.







__________________________________________

Booker Peck MD





DD:  10/07/2018 9:22:56

DT:  10/07/2018 10:19:06

[facility] # [id number]

## 2018-10-07 NOTE — CT
Date of service: 



10/07/2018



PROCEDURE:  CT HEAD WITHOUT CONTRAST.



HISTORY:

F/u cardiac arrest



COMPARISON:

None available.



TECHNIQUE:

Axial computed tomography images were obtained through the head/brain 

without intravenous contrast.  



Radiation dose:



Total exam DLP = 943 mGy-cm.



This CT exam was performed using one or more of the following dose 

reduction techniques: Automated exposure control, adjustment of the 

mA and/or kV according to patient size, and/or use of iterative 

reconstruction technique.



FINDINGS:



HEMORRHAGE:

No intracranial hemorrhage. 



BRAIN:

No mass effect or edema.  Mild chronic microvascular changes.



VENTRICLES:

Unremarkable. No hydrocephalus. 



CALVARIUM:

Unremarkable.



PARANASAL SINUSES:

Unremarkable as visualized. No significant inflammatory changes.



MASTOID AIR CELLS:

Unremarkable as visualized. No inflammatory changes.



OTHER FINDINGS:

None.



IMPRESSION:

No acute intracranial findings

## 2018-10-08 LAB
ALBUMIN SERPL-MCNC: 3.4 G/DL (ref 3–4.8)
ALBUMIN/GLOB SERPL: 1.1 {RATIO} (ref 1.1–1.8)
ALT SERPL-CCNC: 31 U/L (ref 7–56)
AST SERPL-CCNC: 40 U/L (ref 14–36)
BASOPHILS # BLD AUTO: 0.01 K/MM3 (ref 0–2)
BASOPHILS NFR BLD: 0.1 % (ref 0–3)
BUN SERPL-MCNC: 26 MG/DL (ref 7–21)
CALCIUM SERPL-MCNC: 8.8 MG/DL (ref 8.4–10.5)
EOSINOPHIL # BLD: 0 10*3/UL (ref 0–0.7)
EOSINOPHIL NFR BLD: 0.1 % (ref 1.5–5)
ERYTHROCYTE [DISTWIDTH] IN BLOOD BY AUTOMATED COUNT: 15.1 % (ref 11.5–14.5)
GFR NON-AFRICAN AMERICAN: 57
GRANULOCYTES # BLD: 8.34 10*3/UL (ref 1.4–6.5)
GRANULOCYTES NFR BLD: 76.1 % (ref 50–68)
HGB BLD-MCNC: 13.4 G/DL (ref 12–16)
LYMPHOCYTES # BLD: 1.3 10*3/UL (ref 1.2–3.4)
LYMPHOCYTES NFR BLD AUTO: 11.7 % (ref 22–35)
MCH RBC QN AUTO: 29.7 PG (ref 25–35)
MCHC RBC AUTO-ENTMCNC: 32.4 G/DL (ref 31–37)
MCV RBC AUTO: 91.8 FL (ref 80–105)
MONOCYTES # BLD AUTO: 1.3 10*3/UL (ref 0.1–0.6)
MONOCYTES NFR BLD: 12 % (ref 1–6)
PLATELET # BLD: 189 10^3/UL (ref 120–450)
PMV BLD AUTO: 11.9 FL (ref 7–11)
RBC # BLD AUTO: 4.51 10^6/UL (ref 3.5–6.1)
WBC # BLD AUTO: 11 10^3/UL (ref 4.5–11)

## 2018-10-08 RX ADMIN — INSULIN HUMAN SCH: 100 INJECTION, SOLUTION PARENTERAL at 06:06

## 2018-10-08 RX ADMIN — CEFEPIME SCH MLS/HR: 1 INJECTION, SOLUTION INTRAVENOUS at 23:09

## 2018-10-08 RX ADMIN — PROPOFOL PRN MLS/HR: 10 INJECTION, EMULSION INTRAVENOUS at 16:19

## 2018-10-08 RX ADMIN — METRONIDAZOLE SCH MLS/HR: 500 INJECTION, SOLUTION INTRAVENOUS at 22:05

## 2018-10-08 RX ADMIN — Medication PRN MLS/HR: at 00:30

## 2018-10-08 RX ADMIN — CEFEPIME SCH MLS/HR: 1 INJECTION, SOLUTION INTRAVENOUS at 04:59

## 2018-10-08 RX ADMIN — DOBUTAMINE HYDROCHLORIDE PRN MLS/HR: 200 INJECTION INTRAVENOUS at 23:01

## 2018-10-08 RX ADMIN — INSULIN HUMAN SCH: 100 INJECTION, SOLUTION PARENTERAL at 14:49

## 2018-10-08 RX ADMIN — INSULIN HUMAN SCH: 100 INJECTION, SOLUTION PARENTERAL at 00:29

## 2018-10-08 RX ADMIN — DOBUTAMINE HYDROCHLORIDE PRN MLS/HR: 200 INJECTION INTRAVENOUS at 04:55

## 2018-10-08 RX ADMIN — CEFEPIME SCH MLS/HR: 1 INJECTION, SOLUTION INTRAVENOUS at 14:48

## 2018-10-08 RX ADMIN — INSULIN HUMAN SCH: 100 INJECTION, SOLUTION PARENTERAL at 18:09

## 2018-10-08 NOTE — CP.PCM.PN
Subjective





- Date & Time of Evaluation


Date of Evaluation: 10/08/18


Time of Evaluation: 08:30





- Subjective


Subjective: 





Patient is a little more awake today but still does not respond appropriately, 

no fevers, still on the ventilator.





Objective





- Vital Signs/Intake and Output


Vital Signs (last 24 hours): 


                                        











Temp Pulse Resp BP Pulse Ox


 


 99.9 F H  87   20   147/84   99 


 


 10/08/18 05:50  10/08/18 10:54  10/08/18 07:45  10/08/18 10:54  10/08/18 07:45








Intake and Output: 


                                        











 10/08/18 10/08/18





 06:59 18:59


 


Intake Total 2698 64


 


Output Total 400 


 


Balance 2298 64














- Medications


Medications: 


                               Current Medications





Amiodarone HCl (Cordarone)  200 mg PO DAILY [facility]


   Last Admin: 10/08/18 10:53 Dose:  200 mg


Artificial Tears (Refresh Opth Soln)  0.3 ml OU Q4H PRN


   PRN Reason: Dry eyes


   Last Admin: 10/07/18 17:47 Dose:  2 drop


Aspirin (Aspirin Chewable)  81 mg NG DAILY [facility]


   Last Admin: 10/08/18 10:54 Dose:  81 mg


Atorvastatin Calcium (Lipitor)  80 mg NG DIN [facility]


   Last Admin: 10/07/18 16:22 Dose:  80 mg


Carvedilol (Coreg)  3.125 mg PO BID [facility]


   Last Admin: 10/08/18 10:35 Dose:  3.125 mg


Furosemide (Lasix)  40 mg IVP DAILY [facility]


   Last Admin: 10/08/18 10:52 Dose:  40 mg


Gemfibrozil (Lopid)  600 mg PO BID [facility]


   Last Admin: 10/08/18 10:53 Dose:  600 mg


Heparin Sodium (Porcine) (Heparin)  5,000 units SC Q8 [facility]; Protocol


   Last Admin: 10/08/18 06:43 Dose:  5,000 units


Heparin Sodium/Sodium Chloride (Heparin 29129 Units/250ml 1/2 Normal Saline)  

25,000 units in 250 mls @ 15.241 mls/hr IV .[id number] [facility]; Protocol


   Last Admin: 10/04/18 11:30 Dose:  Not Given


Cisatracurium Besylate 200 mg/ (Sodium Chloride)  270 mls @ 4.66 mls/hr IV .Q24H

PRN; Protocol


   PRN Reason: TITRATE PER MD ORDER


   Last Titration: 10/06/18 07:30 Dose:  0 mcg/kg/min, 0 mls/hr


Cefepime HCl (Maxipime 2gm)  2 gm in 100 mls @ 100 mls/hr IVPB Q8 [facility]; Protocol


   Stop: 10/10/18 08:01


   Last Admin: 10/08/18 04:59 Dose:  100 mls/hr


Dobutamine HCl/Dextrose (Dobutamine/Dextrose 5% 500mg/250ml)  500 mg in 250 mls 

@ 17.25 mls/hr IV .[id number] PRN; Protocol


   PRN Reason: TITRATE PER PROTOCOL


   Last Admin: 10/08/18 04:55 Dose:  5 mcg/kg/min, 17.25 mls/hr


Midazolam 100 mg/100ml in NS (Midazolam 100 Mg/100ml In Ns)  100 mg in 100 mls @

5 mls/hr IV .Q20H PRN; Protocol


   PRN Reason: Sedation


   Last Titration: 10/08/18 08:52 Dose:  1.25 mg/hr, 1.25 mls/hr


Levetiracetam 1,500 mg/ Sodium (Chloride)  115 mls @ 460 mls/hr IV Q12 [facility]


   Last Admin: 10/08/18 10:54 Dose:  460 mls/hr


Insulin Human Regular (Humulin R Med)  0 units SC Q6H [facility]; Protocol


   Last Admin: 10/08/18 06:06 Dose:  Not Given


Lisinopril (Zestril)  2.5 mg PO DAILY [facility]


   Last Admin: 10/08/18 10:54 Dose:  2.5 mg


Pantoprazole Sodium (Protonix Inj)  40 mg IVP DAILY [facility]


   Last Admin: 10/08/18 10:52 Dose:  40 mg


Spironolactone (Aldactone)  25 mg PO BID [facility]


   Last Admin: 10/08/18 10:54 Dose:  25 mg











- Labs


Labs: 


                                        





                                 10/08/18 07:15 





                                 10/08/18 07:15 





                                        











PT  12.7 SECONDS (9.4-12.5)  H  10/04/18  09:15    


 


INR  1.10   10/04/18  09:15    


 


APTT  27.3 Seconds (25.1-36.5)   10/04/18  09:15    














- Constitutional


Appears: Chronically Ill, Other (intubated)





- Head Exam


Head Exam: NORMAL INSPECTION





- ENT Exam


Additional comments: 





ET tube in place





- Respiratory Exam


Respiratory Exam: Decreased Breath Sounds





- Cardiovascular Exam


Cardiovascular Exam: +S1, +S2





- GI/Abdominal Exam


GI & Abdominal Exam: Soft.  absent: Tenderness





Assessment and Plan





- Assessment and Plan (Free Text)


Plan: 





Assessment


Systemic inflammatory response syndrome with VDRF due to cardiopulmonary arrest 

R/O acute coronary syndrome, with probable anoxic encephalopathy, R/O severe 

sepsis from aspiration pneumonia


chronic CHF


HTN


DM


COPD


atrial fibrillation


morbid obesity with BMI 40





Plan


continue Cefepime, Flagyl ,Doxycycline day 4; cultures and MRSA nares are negat

arabella, will d/c IV Vancomycin; reviewed CT A/P and CXR - complete 4-7 days of 

antibiotics


overall prognosis is poor


follow up further recommendations of GI, Neuro, Cardio

## 2018-10-08 NOTE — CP.PCM.PN
Subjective





- Date & Time of Evaluation


Date of Evaluation: 10/08/18


Time of Evaluation: 21:08





- Subjective


Subjective: 





It was requested by daughter and  multiple family members who are bedside to 

extubate patient.


Azeb said that she has been in talks with neurologist , she understands the 

poor prognosis.


She signed the consent form.


I spoke to  also .I placed an order to terminally extubate patient.





Objective





- Vital Signs/Intake and Output


Vital Signs (last 24 hours): 


                                        











Temp Pulse Resp BP Pulse Ox


 


 99.9 F H  88   20   209/109 H  98 


 


 10/08/18 16:31  10/08/18 20:15  10/08/18 07:45  10/08/18 18:14  10/08/18 16:31








Intake and Output: 


                                        











 10/08/18 10/09/18





 18:59 06:59


 


Intake Total 104 448


 


Output Total  200


 


Balance 104 248














- Medications


Medications: 


                               Current Medications





Amiodarone HCl (Cordarone)  200 mg PO DAILY [facility]


   Last Admin: 10/08/18 10:53 Dose:  200 mg


Artificial Tears (Refresh Opth Soln)  0.3 ml OU Q4H PRN


   PRN Reason: Dry eyes


   Last Admin: 10/07/18 17:47 Dose:  2 drop


Aspirin (Aspirin Chewable)  81 mg NG DAILY [facility]


   Last Admin: 10/08/18 10:54 Dose:  81 mg


Atorvastatin Calcium (Lipitor)  80 mg NG DIN [facility]


   Last Admin: 10/08/18 18:15 Dose:  80 mg


Carvedilol (Coreg)  3.125 mg PO BID [facility]


   Last Admin: 10/08/18 18:14 Dose:  3.125 mg


Furosemide (Lasix)  40 mg IVP DAILY [facility]


   Last Admin: 10/08/18 10:52 Dose:  40 mg


Gemfibrozil (Lopid)  600 mg PO BID [facility]


   Last Admin: 10/08/18 18:14 Dose:  600 mg


Heparin Sodium (Porcine) (Heparin)  5,000 units SC Q8 [facility]; Protocol


   Last Admin: 10/08/18 14:52 Dose:  5,000 units


Heparin Sodium/Sodium Chloride (Heparin 98534 Units/250ml 1/2 Normal Saline)  

25,000 units in 250 mls @ 15.241 mls/hr IV .[id number] [facility]; Protocol


   Last Admin: 10/04/18 11:30 Dose:  Not Given


Cisatracurium Besylate 200 mg/ (Sodium Chloride)  270 mls @ 4.66 mls/hr IV .Q24H

PRN; Protocol


   PRN Reason: TITRATE PER MD ORDER


   Last Titration: 10/06/18 07:30 Dose:  0 mcg/kg/min, 0 mls/hr


Cefepime HCl (Maxipime 2gm)  2 gm in 100 mls @ 100 mls/hr IVPB Q8 [facility]; Protocol


   Stop: 10/10/18 08:01


   Last Admin: 10/08/18 14:48 Dose:  100 mls/hr


Dobutamine HCl/Dextrose (Dobutamine/Dextrose 5% 500mg/250ml)  500 mg in 250 mls 

@ 17.25 mls/hr IV .[id number] PRN; Protocol


   PRN Reason: TITRATE PER PROTOCOL


   Last Admin: 10/08/18 04:55 Dose:  5 mcg/kg/min, 17.25 mls/hr


Midazolam 100 mg/100ml in NS (Midazolam 100 Mg/100ml In Ns)  100 mg in 100 mls @

5 mls/hr IV .Q20H PRN; Protocol


   PRN Reason: Sedation


   Last Titration: 10/08/18 08:52 Dose:  1.25 mg/hr, 1.25 mls/hr


Levetiracetam 1,500 mg/ Sodium (Chloride)  115 mls @ 460 mls/hr IV Q12 [facility]


   Last Admin: 10/08/18 10:54 Dose:  460 mls/hr


Doxycycline Hyclate 100 mg/ (Sodium Chloride)  100 mls @ 100 mls/hr IVPB Q12 

[facility]; Protocol


Metronidazole (Flagyl)  500 mg in 100 mls @ 100 mls/hr IVPB Q8 [facility]; Protocol


Propofol (Diprivan)  1,000 mg in 100 mls @ 3.402 mls/hr IV .Q24H PRN; Protocol


   PRN Reason: TITRATE PER MD ORDER


   Last Titration: 10/08/18 18:10 Dose:  10 mcg/kg/min, 6.804 mls/hr


Insulin Human Regular (Humulin R Med)  0 units SC Q6H [facility]; Protocol


   Last Admin: 10/08/18 18:09 Dose:  Not Given


Lisinopril (Zestril)  2.5 mg PO DAILY [facility]


   Last Admin: 10/08/18 10:54 Dose:  2.5 mg


Pantoprazole Sodium (Protonix Inj)  40 mg IVP DAILY [facility]


   Last Admin: 10/08/18 10:52 Dose:  40 mg


Spironolactone (Aldactone)  25 mg PO BID [facility]


   Last Admin: 10/08/18 18:14 Dose:  25 mg











- Labs


Labs: 


                                        





                                 10/08/18 07:15 





                                 10/08/18 07:15 





                                        











PT  12.7 SECONDS (9.4-12.5)  H  10/04/18  09:15    


 


INR  1.10   10/04/18  09:15    


 


APTT  27.3 Seconds (25.1-36.5)   10/04/18  09:15

## 2018-10-08 NOTE — CP.PCM.PN
Subjective





- Date & Time of Evaluation


Date of Evaluation: 10/08/18


Time of Evaluation: 17:58





- Subjective


Subjective: 





Pt seen and examined in the ICU. Pt intubated. 





Objective





- Vital Signs/Intake and Output


Vital Signs (last 24 hours): 


                                        











Temp Pulse Resp BP Pulse Ox


 


 99.9 F H  96 H  20   151/98 H  98 


 


 10/08/18 16:31  10/08/18 16:31  10/08/18 07:45  10/08/18 16:31  10/08/18 16:31








Intake and Output: 


                                        











 10/08/18 10/08/18





 06:59 18:59


 


Intake Total 2698 64


 


Output Total 400 


 


Balance 2298 64














- Medications


Medications: 


                               Current Medications





Amiodarone HCl (Cordarone)  200 mg PO DAILY [facility]


   Last Admin: 10/08/18 10:53 Dose:  200 mg


Artificial Tears (Refresh Opth Soln)  0.3 ml OU Q4H PRN


   PRN Reason: Dry eyes


   Last Admin: 10/07/18 17:47 Dose:  2 drop


Aspirin (Aspirin Chewable)  81 mg NG DAILY [facility]


   Last Admin: 10/08/18 10:54 Dose:  81 mg


Atorvastatin Calcium (Lipitor)  80 mg NG DIN [facility]


   Last Admin: 10/07/18 16:22 Dose:  80 mg


Carvedilol (Coreg)  3.125 mg PO BID [facility]


   Last Admin: 10/08/18 10:35 Dose:  3.125 mg


Furosemide (Lasix)  40 mg IVP DAILY [facility]


   Last Admin: 10/08/18 10:52 Dose:  40 mg


Gemfibrozil (Lopid)  600 mg PO BID [facility]


   Last Admin: 10/08/18 10:53 Dose:  600 mg


Heparin Sodium (Porcine) (Heparin)  5,000 units SC Q8 [facility]; Protocol


   Last Admin: 10/08/18 14:52 Dose:  5,000 units


Heparin Sodium/Sodium Chloride (Heparin 56664 Units/250ml 1/2 Normal Saline)  

25,000 units in 250 mls @ 15.241 mls/hr IV .[id number] [facility]; Protocol


   Last Admin: 10/04/18 11:30 Dose:  Not Given


Cisatracurium Besylate 200 mg/ (Sodium Chloride)  270 mls @ 4.66 mls/hr IV .Q24H

PRN; Protocol


   PRN Reason: TITRATE PER MD ORDER


   Last Titration: 10/06/18 07:30 Dose:  0 mcg/kg/min, 0 mls/hr


Cefepime HCl (Maxipime 2gm)  2 gm in 100 mls @ 100 mls/hr IVPB Q8 [facility]; Protocol


   Stop: 10/10/18 08:01


   Last Admin: 10/08/18 14:48 Dose:  100 mls/hr


Dobutamine HCl/Dextrose (Dobutamine/Dextrose 5% 500mg/250ml)  500 mg in 250 mls 

@ 17.25 mls/hr IV .[id number] PRN; Protocol


   PRN Reason: TITRATE PER PROTOCOL


   Last Admin: 10/08/18 04:55 Dose:  5 mcg/kg/min, 17.25 mls/hr


Midazolam 100 mg/100ml in NS (Midazolam 100 Mg/100ml In Ns)  100 mg in 100 mls @

5 mls/hr IV .Q20H PRN; Protocol


   PRN Reason: Sedation


   Last Titration: 10/08/18 08:52 Dose:  1.25 mg/hr, 1.25 mls/hr


Levetiracetam 1,500 mg/ Sodium (Chloride)  115 mls @ 460 mls/hr IV Q12 [facility]


   Last Admin: 10/08/18 10:54 Dose:  460 mls/hr


Doxycycline Hyclate 100 mg/ (Sodium Chloride)  100 mls @ 100 mls/hr IVPB Q12 

[facility]; Protocol


Metronidazole (Flagyl)  500 mg in 100 mls @ 100 mls/hr IVPB Q8 [facility]; Protocol


Propofol (Diprivan)  1,000 mg in 100 mls @ 3.402 mls/hr IV .Q24H [facility]; Protocol


   PRN Reason: TITRATE PER MD ORDER


   Last Admin: 10/08/18 16:19 Dose:  5 mcg/kg/min, 3.402 mls/hr


Insulin Human Regular (Humulin R Med)  0 units SC Q6H [facility]; Protocol


   Last Admin: 10/08/18 14:49 Dose:  Not Given


Lisinopril (Zestril)  2.5 mg PO DAILY [facility]


   Last Admin: 10/08/18 10:54 Dose:  2.5 mg


Pantoprazole Sodium (Protonix Inj)  40 mg IVP DAILY [facility]


   Last Admin: 10/08/18 10:52 Dose:  40 mg


Spironolactone (Aldactone)  25 mg PO BID [facility]


   Last Admin: 10/08/18 10:54 Dose:  25 mg











- Labs


Labs: 


                                        





                                 10/08/18 07:15 





                                 10/08/18 07:15 





                                        











PT  12.7 SECONDS (9.4-12.5)  H  10/04/18  09:15    


 


INR  1.10   10/04/18  09:15    


 


APTT  27.3 Seconds (25.1-36.5)   10/04/18  09:15    














- Constitutional


Appears: No Acute Distress





- Head Exam


Head Exam: ATRAUMATIC, NORMOCEPHALIC





- Respiratory Exam


Respiratory Exam: Clear to Ausculation Bilateral, NORMAL BREATHING PATTERN





Assessment and Plan





- Assessment and Plan (Free Text)


Assessment: 





Pt is a 59 yo female with PMH of systolic CHF, HTN, DM, COPD, AFib who presents 

to hospital s/p cardiac arrest. Patient currently intubated/sedated. Cardiac 

cath showed no thrombus, mild nonobstructive CAD only in mid circumflex 60-70%; 

EF 15-20%; CT chest showed aspiration pneumonia; PNA studies negative currently;

CT head showed acute abnormalities; On keppra for questionable seizures


Plan: 





Cardiac Arrest


- 10-7-18 CT head no acute findings


- ET tube, NG tube, Propofol drip


- Dobutamine drip 


- Amiodarone 400 mg po tid


- Aspirin 81 NG


- Doppler: hypoechoic acute thrombus in left popliteal vein


- Cardio Consult- Dr. Peck- Cardiac cath performed- showing mild CAD and EF of 

10-15%


- Neuro Consult- Dr. Man- Video EEG inconclusive study; anoxic brain injury vs 

siezures (less likely) 


- ICU consult- Dr. Danielson- Keppra 500mg for seizure ppx


- Pallitive care: plan to contact family to discuss goals of care





Aspiration Pneumonia


10-5-18 Cxray shows resolution of upper lobe infiltrate


Cefepime 2mg (10-5)


Flagyl 500mg (10-6)


Doxycycline 100mg (10-4)


Leukocytosis resolved


ID consulted- DC IV vanc





Hypertriglycredemia


Lopoid 600mg po bid


Lipitor 80 mg NG din





HTN


coreg 3.125 mg po bid





DM2


ISS low dose


ACHS





Hx of COPD


- Patient currently intubated





PPx


GI ppx- Protonix 40mg IVP daily


DVT ppx- Heparin 5000 units sc








Pt seen, examined, assessment and plan discussed with Dr Jaxson Diaz PGY1

## 2018-10-08 NOTE — CP.PCM.PN
<Maggie Garza - Last Filed: 10/08/18 15:37>





Subjective





- Date & Time of Evaluation


Date of Evaluation: 10/08/18


Time of Evaluation: 09:11





- Subjective


Subjective: 


Maggie Garza, PGY2, Neurology Progress Note for Dr Jimenez:





Patient seen and examined at bedside. No acute events overnight. Patient off 

Versed drip today around 8 AM. Patient noted to have + hand and eye twitching 

movements. + Corneals reflex, responds to sternal rub, pain (upper extremities 

and left lower extremity), no gag reflex, mildly breathing over the vent, pupils

dilated but reactive to light. Spot EEG obtained, which showed status 

epilepticus. Discussed extensively with patient's family regarding patient's 

prognosis. All questions answered.





ROS unable to obtain due to patient's condition.  





Objective





- Vital Signs/Intake and Output


Vital Signs (last 24 hours): 


                                        











Temp Pulse Resp BP Pulse Ox


 


 99.9 F H  87   21   142/90   97 


 


 10/08/18 05:50  10/08/18 05:50  10/07/18 08:22  10/08/18 05:30  10/08/18 05:50








Intake and Output: 


                                        











 10/08/18 10/08/18





 06:59 18:59


 


Intake Total 2698 64


 


Output Total 400 


 


Balance 2298 64














- Medications


Medications: 


                               Current Medications





Amiodarone HCl (Cordarone)  200 mg PO DAILY [facility]


Artificial Tears (Refresh Opth Soln)  0.3 ml OU Q4H PRN


   PRN Reason: Dry eyes


   Last Admin: 10/07/18 17:47 Dose:  2 drop


Aspirin (Aspirin Chewable)  81 mg NG DAILY [facility]


   Last Admin: 10/07/18 10:45 Dose:  81 mg


Atorvastatin Calcium (Lipitor)  80 mg NG DIN [facility]


   Last Admin: 10/07/18 16:22 Dose:  80 mg


Carvedilol (Coreg)  3.125 mg PO BID [facility]


   Last Admin: 10/07/18 17:41 Dose:  3.125 mg


Furosemide (Lasix)  40 mg IVP DAILY [facility]


   Last Admin: 10/07/18 10:47 Dose:  40 mg


Gemfibrozil (Lopid)  600 mg PO BID [facility]


   Last Admin: 10/07/18 17:35 Dose:  600 mg


Heparin Sodium (Porcine) (Heparin)  5,000 units SC Q8 [facility]; Protocol


   Last Admin: 10/08/18 06:43 Dose:  5,000 units


Heparin Sodium/Sodium Chloride (Heparin 41285 Units/250ml 1/2 Normal Saline)  

25,000 units in 250 mls @ 15.241 mls/hr IV .[id number] [facility]; Protocol


   Last Admin: 10/04/18 11:30 Dose:  Not Given


Cisatracurium Besylate 200 mg/ (Sodium Chloride)  270 mls @ 4.66 mls/hr IV .Q24H

PRN; Protocol


   PRN Reason: TITRATE PER MD ORDER


   Last Titration: 10/06/18 07:30 Dose:  0 mcg/kg/min, 0 mls/hr


Cefepime HCl (Maxipime 2gm)  2 gm in 100 mls @ 100 mls/hr IVPB Q8 [facility]; Protocol


   Stop: 10/10/18 08:01


   Last Admin: 10/08/18 04:59 Dose:  100 mls/hr


Dobutamine HCl/Dextrose (Dobutamine/Dextrose 5% 500mg/250ml)  500 mg in 250 mls 

@ 17.25 mls/hr IV .[id number] PRN; Protocol


   PRN Reason: TITRATE PER PROTOCOL


   Last Admin: 10/08/18 04:55 Dose:  5 mcg/kg/min, 17.25 mls/hr


Midazolam 100 mg/100ml in NS (Midazolam 100 Mg/100ml In Ns)  100 mg in 100 mls @

5 mls/hr IV .Q20H PRN; Protocol


   PRN Reason: Sedation


   Last Titration: 10/08/18 08:52 Dose:  1.25 mg/hr, 1.25 mls/hr


Levetiracetam 1,500 mg/ Sodium (Chloride)  115 mls @ 460 mls/hr IV Q12 [facility]


   Last Admin: 10/07/18 21:27 Dose:  460 mls/hr


Insulin Human Regular (Humulin R Med)  0 units SC Q6H [facility]; Protocol


   Last Admin: 10/08/18 06:06 Dose:  Not Given


Lisinopril (Zestril)  2.5 mg PO DAILY [facility]


   Last Admin: 10/07/18 12:31 Dose:  2.5 mg


Pantoprazole Sodium (Protonix Inj)  40 mg IVP DAILY [facility]


   Last Admin: 10/07/18 10:58 Dose:  40 mg


Spironolactone (Aldactone)  25 mg PO BID [facility]


   Last Admin: 10/07/18 17:35 Dose:  25 mg











- Labs


Labs: 


                                        





                                 10/08/18 07:15 





                                 10/08/18 07:15 





                                        











PT  12.7 SECONDS (9.4-12.5)  H  10/04/18  09:15    


 


INR  1.10   10/04/18  09:15    


 


APTT  27.3 Seconds (25.1-36.5)   10/04/18  09:15    














- Constitutional


Appears: Toxic, Chronically Ill





- Head Exam


Head Exam: ATRAUMATIC, NORMOCEPHALIC





- Eye Exam


Pupil Exam: absent: Fixed


Additional comments: 





pupils 4+ dilated, briskly reactive to light





- Respiratory Exam


Respiratory Exam: Decreased Breath Sounds, Rhonchi





- Cardiovascular Exam


Cardiovascular Exam: +S1, +S2.  absent: Murmur





- GI/Abdominal Exam


GI & Abdominal Exam: Soft, Normal Bowel Sounds.  absent: Rigid





- Extremities Exam


Extremities Exam: Normal Capillary Refill.  absent: Full ROM, Pedal Edema





- Neurological Exam


Neurological Exam: Altered, Reflexes Normal


Additional comments: 





Responds to painful stimuli. + corneals, breathing over the vent, pupils 

dilated, but briskly reactive. No gag, dolls eyes.





- Skin


Skin Exam: Warm





Assessment and Plan





- Assessment and Plan (Free Text)


Assessment: 





58 year old female with PMH of systolic CHF with EF 15-20%, HTN, DM, COPD, AFib,

presents s/p cardiac arrest. Cardiac cath showed nonobstructive CAD. Neurology 

consulted for anoxic brain injury:





- Spot EEG today showed status epilepticus


- C/w Keppra 1500 mg IV q12 for now


- Discussed prognosis with family extensively, would like to have family come in

tonight, leaning towards terminal extubation/comfort care.


- Poor prognosis





Case discussed with Dr Jimenez.





<Leandro Jimenez - Last Filed: 10/10/18 17:43>





Objective





- Vital Signs/Intake and Output


Vital Signs (last 24 hours): 


                                        











Temp Pulse Resp BP Pulse Ox


 


 99.9 F H  55 L  17   80/39 L  76 L


 


 10/09/18 07:59  10/09/18 07:44  10/09/18 07:57  10/09/18 07:00  10/09/18 07:35











- Labs


Labs: 


                                        





                                 10/08/18 07:15 





                                 10/09/18 05:40 





                                        











PT  12.7 SECONDS (9.4-12.5)  H  10/04/18  09:15    


 


INR  1.10   10/04/18  09:15    


 


APTT  27.3 Seconds (25.1-36.5)   10/04/18  09:15    














Assessment and Plan


(1) Anoxic brain injury


Status: Acute   





(2) Status epilepticus


Status: Acute   





Attending/Attestation





- Attestation


I have personally seen and examined this patient.: Yes


I have fully participated in the care of the patient.: Yes


I have reviewed all pertinent clinical information, including history, physical 

exam and plan: Yes


Notes (Text): 





10/10/18 17:43


I agree with the assessment and plan.  As mentioned, the prognosis is poor; 

however, we will continue to treat the active seizures.

## 2018-10-08 NOTE — CP.PCM.CON
History of Present Illness





- History of Present Illness


History of Present Illness: 





Palliative consult requested by  


Reason: Goals of care 





58m year old female with history of CHF, HTN, COPD and A fib  who on 10/4 became

unresponsive at home. EMS called and found the patient in PEA> ACL> intubated > 

ROSC in 2 minutes. EKG showed ST elevations. Remains intubated, no gag or 

pupillary reflexes,off sedation.GCS 3 





Head CT's on 10/4 and 10/7 showed no acute abnormalities.


Chest x ray 10/4: ASH atelectasis and consolidation with volume loss, no PE


EEG 10/4: Flat no cerebral activity.


Doppler lower extremities 10/7: Hypoechoic acute thrombus in left popliteal vein





PMHx:COPD, HTN, DM, hypothyroidism, systolic CHF, dilated cardiomyopathy, TIA


PSH: C sections x4, appendectomy, tonsillectomy





Family History: Mother > DM, .





Social History: Former heavy smoker, no alcohol or drug use.





Advance Care Planning: The patient does not have an Advanced Directive





Review of Systems: Unable to obtain, unresponsive, intubated





Past Patient History





- Infectious Disease


Hx of Infectious Diseases: None





- Tetanus Immunizations


Tetanus Immunization: Unknown





- Past Social History


Smoking Status: Former Smoker





- CARDIAC


Hx Congestive Heart Failure: Yes


Hx Hypertension: Yes





- PULMONARY


Hx Respiratory Disorders: Yes (USED TO SMOKE CIGARETTES 3 CIG A DAY FOR 15 YRS.)





- NEUROLOGICAL


HX Cerebrovascular Accident: Yes (TIA)





- HEENT


Hx HEENT Problems: No





- RENAL


Hx Chronic Kidney Disease: No





- ENDOCRINE/METABOLIC


Hx Diabetes Mellitus Type 2: Yes


Hx Hypothyroidism: Yes





- HEMATOLOGICAL/ONCOLOGICAL


Hx Blood Disorders: No





- INTEGUMENTARY


Hx Dermatological Problems: No





- MUSCULOSKELETAL/RHEUMATOLOGICAL


Hx Arthritis: Yes





- GASTROINTESTINAL


Hx Gastrointestinal Disorders: Yes (APPENDECTOMY,TONSILLECTOMY)


Hx Gall Bladder Disease: Yes (CHOLEYCYSTECTOMY)





- GENITOURINARY/GYNECOLOGICAL


Hx Genitourinary Disorders: No





- PSYCHIATRIC


Hx Psychophysiologic Disorder: Yes


Hx Anxiety: Yes


Hx Depression: Yes


Hx Substance Use: No





- SURGICAL HISTORY


Hx Appendectomy: Yes


Hx Cholecystectomy: Yes





Meds


Allergies/Adverse Reactions: 


                                    Allergies











Allergy/AdvReac Type Severity Reaction Status Date / Time


 


No Known Allergies Allergy   Verified 10/04/18 14:47














- Medications


Medications: 


                               Current Medications





Amiodarone HCl (Cordarone)  200 mg PO DAILY JIM


   Last Admin: 10/08/18 10:53 Dose:  200 mg


Artificial Tears (Refresh Opth Soln)  0.3 ml OU Q4H PRN


   PRN Reason: Dry eyes


   Last Admin: 10/07/18 17:47 Dose:  2 drop


Aspirin (Aspirin Chewable)  81 mg NG DAILY [facility]


   Last Admin: 10/08/18 10:54 Dose:  81 mg


Atorvastatin Calcium (Lipitor)  80 mg NG DIN [facility]


   Last Admin: 10/07/18 16:22 Dose:  80 mg


Carvedilol (Coreg)  3.125 mg PO BID [facility]


   Last Admin: 10/08/18 10:35 Dose:  3.125 mg


Furosemide (Lasix)  40 mg IVP DAILY [facility]


   Last Admin: 10/08/18 10:52 Dose:  40 mg


Gemfibrozil (Lopid)  600 mg PO BID [facility]


   Last Admin: 10/08/18 10:53 Dose:  600 mg


Heparin Sodium (Porcine) (Heparin)  5,000 units SC Q8 JIM; Protocol


   Last Admin: 10/08/18 14:52 Dose:  5,000 units


Heparin Sodium/Sodium Chloride (Heparin 47075 Units/250ml 1/2 Normal Saline)  

25,000 units in 250 mls @ 15.241 mls/hr IV .[id number] JIM; Protocol


   Last Admin: 10/04/18 11:30 Dose:  Not Given


Cisatracurium Besylate 200 mg/ (Sodium Chloride)  270 mls @ 4.66 mls/hr IV .Q24H

PRN; Protocol


   PRN Reason: TITRATE PER MD ORDER


   Last Titration: 10/06/18 07:30 Dose:  0 mcg/kg/min, 0 mls/hr


Cefepime HCl (Maxipime 2gm)  2 gm in 100 mls @ 100 mls/hr IVPB Q8 JIM; Protocol


   Stop: 10/10/18 08:01


   Last Admin: 10/08/18 14:48 Dose:  100 mls/hr


Dobutamine HCl/Dextrose (Dobutamine/Dextrose 5% 500mg/250ml)  500 mg in 250 mls 

@ 17.25 mls/hr IV .[id number] PRN; Protocol


   PRN Reason: TITRATE PER PROTOCOL


   Last Admin: 10/08/18 04:55 Dose:  5 mcg/kg/min, 17.25 mls/hr


Midazolam 100 mg/100ml in NS (Midazolam 100 Mg/100ml In Ns)  100 mg in 100 mls @

5 mls/hr IV .Q20H PRN; Protocol


   PRN Reason: Sedation


   Last Titration: 10/08/18 08:52 Dose:  1.25 mg/hr, 1.25 mls/hr


Levetiracetam 1,500 mg/ Sodium (Chloride)  115 mls @ 460 mls/hr IV Q12 JIM


   Last Admin: 10/08/18 10:54 Dose:  460 mls/hr


Doxycycline Hyclate 100 mg/ (Sodium Chloride)  100 mls @ 100 mls/hr IVPB Q12 

JIM; Protocol


Metronidazole (Flagyl)  500 mg in 100 mls @ 100 mls/hr IVPB Q8 JIM; Protocol


Insulin Human Regular (Humulin R Med)  0 units SC Q6H JIM; Protocol


   Last Admin: 10/08/18 14:49 Dose:  Not Given


Lisinopril (Zestril)  2.5 mg PO DAILY [facility]


   Last Admin: 10/08/18 10:54 Dose:  2.5 mg


Pantoprazole Sodium (Protonix Inj)  40 mg IVP DAILY [facility]


   Last Admin: 10/08/18 10:52 Dose:  40 mg


Spironolactone (Aldactone)  25 mg PO BID [facility]


   Last Admin: 10/08/18 10:54 Dose:  25 mg











Physical Exam





- Constitutional


Appears: No Acute Distress


Additional comments: 





obese





- Head Exam


Head Exam: NORMOCEPHALIC





- ENT Exam


ENT Exam: Mucous Membranes Moist





- Respiratory Exam


Respiratory Exam: Decreased Breath Sounds, Rhonchi





- Cardiovascular Exam


Cardiovascular Exam: Tachycardia, Irregular Rhythm, +S1, +S2





- GI/Abdominal Exam


GI & Abdominal Exam: Normal Bowel Sounds, Soft





- Skin


Skin Exam: Dry, Pallor





- Additional Findings


Additional findings: 





palliative performance scale rating 10%





Results





- Vital Signs


Recent Vital Signs: 


                                Last Vital Signs











Temp  99.9 F H  10/08/18 05:50


 


Pulse  87   10/08/18 10:54


 


Resp  20   10/08/18 07:45


 


BP  147/84   10/08/18 10:54


 


Pulse Ox  99   10/08/18 07:45














- Labs


Result Diagrams: 


                                 10/08/18 07:15





                                 10/09/18 05:40


Labs: 


                         Laboratory Results - last 24 hr











  10/07/18 10/07/18 10/07/18





  16:00 18:04 23:49


 


WBC   


 


RBC   


 


Hgb   


 


Hct   


 


MCV   


 


MCH   


 


MCHC   


 


RDW   


 


Plt Count   


 


MPV   


 


Gran %   


 


Lymph % (Auto)   


 


Mono % (Auto)   


 


Eos % (Auto)   


 


Baso % (Auto)   


 


Gran #   


 


Lymph # (Auto)   


 


Mono # (Auto)   


 


Eos # (Auto)   


 


Baso # (Auto)   


 


Sodium   140 


 


Potassium   3.6 


 


Chloride   104 


 


Carbon Dioxide   30 


 


Anion Gap   10 


 


BUN   24 H 


 


Creatinine   1.1 


 


Est GFR ( Amer)   > 60 


 


Est GFR (Non-Af Amer)   51 


 


POC Glucose (mg/dL)  111 H   121 H


 


Random Glucose   122 H 


 


Calcium   8.5 


 


Phosphorus   


 


Magnesium   


 


Total Bilirubin   


 


AST   


 


ALT   


 


Alkaline Phosphatase   


 


Total Protein   


 


Albumin   


 


Globulin   


 


Albumin/Globulin Ratio   


 


Triglycerides   96 


 


Cholesterol   130 


 


LDL Cholesterol Direct   62 


 


HDL Cholesterol   41 














  10/08/18 10/08/18 10/08/18





  06:06 07:15 07:15


 


WBC    11.0


 


RBC    4.51


 


Hgb    13.4


 


Hct    41.4


 


MCV    91.8


 


MCH    29.7


 


MCHC    32.4


 


RDW    15.1 H


 


Plt Count    189


 


MPV    11.9 H


 


Gran %    76.1 H


 


Lymph % (Auto)    11.7 L


 


Mono % (Auto)    12.0 H


 


Eos % (Auto)    0.1 L


 


Baso % (Auto)    0.1


 


Gran #    8.34 H


 


Lymph # (Auto)    1.3


 


Mono # (Auto)    1.3 H


 


Eos # (Auto)    0.0


 


Baso # (Auto)    0.01


 


Sodium   142 


 


Potassium   4.2 


 


Chloride   109 H 


 


Carbon Dioxide   27 


 


Anion Gap   10 


 


BUN   26 H 


 


Creatinine   1.0 


 


Est GFR ( Amer)   > 60 


 


Est GFR (Non-Af Amer)   57 


 


POC Glucose (mg/dL)  115 H  


 


Random Glucose   114 H 


 


Calcium   8.8 


 


Phosphorus   3.1 


 


Magnesium   2.4 H 


 


Total Bilirubin   0.6 


 


AST   40 H 


 


ALT   31 


 


Alkaline Phosphatase   51 


 


Total Protein   6.5 


 


Albumin   3.4 


 


Globulin   3.1 


 


Albumin/Globulin Ratio   1.1 


 


Triglycerides   


 


Cholesterol   


 


LDL Cholesterol Direct   


 


HDL Cholesterol   














  10/08/18





  14:40


 


WBC 


 


RBC 


 


Hgb 


 


Hct 


 


MCV 


 


MCH 


 


MCHC 


 


RDW 


 


Plt Count 


 


MPV 


 


Gran % 


 


Lymph % (Auto) 


 


Mono % (Auto) 


 


Eos % (Auto) 


 


Baso % (Auto) 


 


Gran # 


 


Lymph # (Auto) 


 


Mono # (Auto) 


 


Eos # (Auto) 


 


Baso # (Auto) 


 


Sodium 


 


Potassium 


 


Chloride 


 


Carbon Dioxide 


 


Anion Gap 


 


BUN 


 


Creatinine 


 


Est GFR ( Amer) 


 


Est GFR (Non-Af Amer) 


 


POC Glucose (mg/dL)  129 H


 


Random Glucose 


 


Calcium 


 


Phosphorus 


 


Magnesium 


 


Total Bilirubin 


 


AST 


 


ALT 


 


Alkaline Phosphatase 


 


Total Protein 


 


Albumin 


 


Globulin 


 


Albumin/Globulin Ratio 


 


Triglycerides 


 


Cholesterol 


 


LDL Cholesterol Direct 


 


HDL Cholesterol 














Assessment & Plan





- Assessment and Plan (Free Text)


Assessment: 





58 year old female with dilated cardiomyopathy, PAT, HTN, systolic CHF who is 

s/p cardiopulmonary arrest and anoxic brain injury 


Brain flow study showing anoxic brain injury consistent with brain death . EEG's

abnormal> GCS 3T, no gag or corneal reflexes, does not react to pain 





  EEG pending.Family not at bedside, in light of brain flow studies/poor prognos

is, Will contact them to discuss goals of care.





Spoke with Abigail(daughter) by phone, states she understands patients medical 

situation and extremely poor prognosis. Family coming to hospital later this 

evening, will make decision once all together. Psychosocial support provided. 

Spiritual support offered, but declined at this time





Time spent in goals of care discussion with family,15 minutes


Plan: 





Respiratory failure: Intubated, SA02 >95%,daily ABG's





Cardio: EF 20 %, Continue Cordarone, Dobutamine, Coreg, Lasix, 

Spirinalactone,ASA.





Neuro: EEG pending, past EEG's abnormal secondary to severe hypoxic brain 

injury. 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 





DVT: Continue Heparin





SIRS/sepsis: Continue Vancomycin,Doxycycline, Flagyl. ID recs





Goals of care and advance care planning

## 2018-10-08 NOTE — CP.CCUPN
CCU Subjective





- Physician Review


Subjective (Free Text): 


Eliu Neely DO, PGY-1 ICU Progress Note for Dr. Alexander


Patient is a 58 year old female with PMH of DM2, HTN, COPD, and chronic AFib who

presented to ED following witnessed syncopal episode and cardiac arrest. The 

initial rhythm was found to be PEA. Compressions were started immediately per 

EMS. Unclear from records exactly how long she was in cardiac arrest prior to 

EMS arrival. Per EMS, ROSC was achieved after 2 minutes of CPR. 


Overnight, patient has remained stable on vent with same settings. She is still 

not requiring pressor support. 








CCU Objective





- Vital Signs / Intake & Output


Intake and Output (Last 8hrs): 


                                 Intake & Output











 10/07/18 10/08/18 10/08/18





 22:59 06:59 14:59


 


Intake Total 615 2698 64


 


Output Total 700 400 


 


Balance -85 2298 64


 


Weight  250 lb 


 


Intake:   


 


   1464 64


 


    Left Wrist 615  


 


    Right Antecubital  1114 


 


  Oral  0 


 


  Tube Feeding  0 


 


  TPN/PPN  0 


 


  Blood Product  0 


 


  Lipid  0 


 


  Albumin  0 


 


  Other  1234 


 


Output:   


 


  Urine 700 400 


 


    2-way Urethral 700 400 


 


  Stool  0 


 


  Emesis  0 


 


  Oral Regurgitation  0 


 


  Other  0 


 


Other:   


 


  # Bowel Movements 0 0 














- Physical Exam


Head: Positive for: Atraumatic, Other (Intubated)


Pupils: Positive for: Non-Reactive, Other (eye twitching noted on exam, doll's 

eyes and pupillary reflexes absent, slight gag reflex elicited by RN)


Conjunctiva: Positive for: Normal


Mouth: Positive for: Moist Mucous Membranes


Neck: Negative for: JVD


Respiratory/Chest: Positive for: Clear to Auscultation, Good Air Exchange.  

Negative for: Respiratory Distress, Accessory Muscle Use, Wheezes, Rales, 

Rhonchi


Cardiovascular: Positive for: Regular Rate and Rhythm, Normal S1, S2.  Negative 

for: Murmurs, Rub, Gallop


Abdomen: Negative for: Distention, Mass/Organomegaly


Back: Positive for: Normal Inspection


Upper Extremity: Positive for: NORMAL PULSES.  Negative for: Cyanosis, Edema


Lower Extremity: Positive for: Edema (trace LE edema improved from prior exams),

NORMAL PULSES


Neurological: Positive for: Other (non responsive to sternal rub and trapezius 

pinch, doll's eyes pupillary reflexes absent, slight gag reflex elicited by RN)


Skin: Positive for: Warm, Dry, Normal Color.  Negative for: Rashes





- Medications


Active Medications: 


Active Medications











Generic Name Dose Route Start Last Admin





  Trade Name Freq  PRN Reason Stop Dose Admin


 


Amiodarone HCl  200 mg  10/08/18 10:00  





  Cordarone  PO   





  DAILY JIM   





     





     





     





     


 


Artificial Tears  0.3 ml  10/07/18 13:18  10/07/18 17:47





  Refresh Opth Soln  OU   2 drop





  Q4H PRN   Administration





  Dry eyes   





     





     





     


 


Aspirin  81 mg  10/05/18 06:00  10/07/18 10:45





  Aspirin Chewable  NG   81 mg





  DAILY JIM   Administration





     





     





     





     


 


Atorvastatin Calcium  80 mg  10/04/18 17:00  10/07/18 16:22





  Lipitor  NG   80 mg





  DIN JIM   Administration





     





     





     





     


 


Carvedilol  3.125 mg  10/07/18 10:00  10/07/18 17:41





  Coreg  PO   3.125 mg





  BID JIM   Administration





     





     





     





     


 


Furosemide  40 mg  10/07/18 10:00  10/07/18 10:47





  Lasix  IVP   40 mg





  DAILY JIM   Administration





     





     





     





     


 


Gemfibrozil  600 mg  10/05/18 10:15  10/07/18 17:35





  Lopid  PO   600 mg





  BID JIM   Administration





     





     





     





     


 


Heparin Sodium (Porcine)  5,000 units  10/04/18 20:00  10/08/18 06:43





  Heparin  SC   5,000 units





  Q8 JIM   Administration





     





     





  Protocol   





     


 


Heparin Sodium/Sodium Chloride  25,000 units in 250 mls @ 15.241 mls/hr  

10/04/18 11:30  10/04/18 11:30





  Heparin 16023 Units/250ml 1/2 Normal Saline  IV   Not Given





  .[id number] JIM   





     





     





  Protocol   





  12 UNITS/KG/HR   


 


Cisatracurium Besylate 200 mg/  270 mls @ 4.66 mls/hr  10/04/18 16:05  10/06/18 

07:30





  Sodium Chloride  IV   0 mcg/kg/min





  .Q24H PRN   0 mls/hr





  TITRATE PER MD ORDER   Titration





     





  Protocol   





  0.5 MCG/KG/MIN   


 


Cefepime HCl  2 gm in 100 mls @ 100 mls/hr  10/05/18 08:00  10/08/18 04:59





  Maxipime 2gm  IVPB  10/10/18 08:01  100 mls/hr





  Q8 JIM   Administration





     





     





  Protocol   





     


 


Dobutamine HCl/Dextrose  500 mg in 250 mls @ 17.25 mls/hr  10/06/18 10:47  

10/08/18 04:55





  Dobutamine/Dextrose 5% 500mg/250ml  IV   5 mcg/kg/min





  .[id number] PRN   17.25 mls/hr





  TITRATE PER PROTOCOL   Administration





     





  Protocol   





  5 MCG/KG/MIN   


 


Midazolam 100 mg/100ml in NS  100 mg in 100 mls @ 5 mls/hr  10/06/18 11:35  

10/08/18 08:52





  Midazolam 100 Mg/100ml In Ns  IV   1.25 mg/hr





  .Q20H PRN   1.25 mls/hr





  Sedation   Titration





     





  Protocol   





  5 MG/HR   


 


Levetiracetam 1,500 mg/ Sodium  115 mls @ 460 mls/hr  10/06/18 22:00  10/07/18 

21:27





  Chloride  IV   460 mls/hr





  Q12 JIM   Administration





     





     





     





     


 


Insulin Human Regular  0 units  10/06/18 18:00  10/08/18 06:06





  Humulin R Med  SC   Not Given





  Q6H JIM   





     





     





  Protocol   





     


 


Lisinopril  2.5 mg  10/07/18 10:00  10/07/18 12:31





  Zestril  PO   2.5 mg





  DAILY JIM   Administration





     





     





     





     


 


Pantoprazole Sodium  40 mg  10/04/18 11:30  10/07/18 10:58





  Protonix Inj  IVP   40 mg





  DAILY JIM   Administration





     





     





     





     


 


Spironolactone  25 mg  10/07/18 10:00  10/07/18 17:35





  Aldactone  PO   25 mg





  BID JIM   Administration





     





     





     





     














- Patient Studies


Lab Studies: 


                              Microbiology Studies











 10/07/18 13:46 Urine Culture - Final





 Urine,Catheterized    No Growth (<1,000 CFU/ML)


 


 10/04/18 12:45 Blood Culture - Preliminary





 Blood-Venous    NO GROWTH AFTER 3 DAYS


 


 10/04/18 12:33 Blood Culture - Preliminary





 Blood-Venous    NO GROWTH AFTER 3 DAYS


 


 10/04/18 23:00 Gram Stain - Final





 Trachasp Sputum Culture - Final





    NORMAL ORAL KRISTY








                                   Lab Studies











  10/08/18 10/08/18 10/08/18 Range/Units





  07:15 07:15 06:06 


 


WBC  11.0    (4.5-11.0)  10^3/ul


 


RBC  4.51    (3.5-6.1)  10^6/uL


 


Hgb  13.4    (12.0-16.0)  g/dL


 


Hct  41.4    (36.0-48.0)  %


 


MCV  91.8    (80.0-105.0)  fl


 


MCH  29.7    (25.0-35.0)  pg


 


MCHC  32.4    (31.0-37.0)  g/dl


 


RDW  15.1 H    (11.5-14.5)  %


 


Plt Count  189    (120.0-450.0)  10^3/uL


 


MPV  11.9 H    (7.0-11.0)  fl


 


Gran %  76.1 H    (50.0-68.0)  %


 


Lymph % (Auto)  11.7 L    (22.0-35.0)  %


 


Mono % (Auto)  12.0 H    (1.0-6.0)  %


 


Eos % (Auto)  0.1 L    (1.5-5.0)  %


 


Baso % (Auto)  0.1    (0.0-3.0)  %


 


Gran #  8.34 H    (1.4-6.5)  


 


Lymph # (Auto)  1.3    (1.2-3.4)  


 


Mono # (Auto)  1.3 H    (0.1-0.6)  


 


Eos # (Auto)  0.0    (0.0-0.7)  


 


Baso # (Auto)  0.01    (0.0-2.0)  K/mm3


 


Sodium   142   (132-148)  mmol/L


 


Potassium   4.2   (3.6-5.0)  mmol/L


 


Chloride   109 H   ()  mmol/L


 


Carbon Dioxide   27   (21-33)  mmol/L


 


Anion Gap   10   (10-20)  


 


BUN   26 H   (7-21)  mg/dL


 


Creatinine   1.0   (0.7-1.2)  mg/dl


 


Est GFR (African Amer)   > 60   


 


Est GFR (Non-Af Amer)   57   


 


POC Glucose (mg/dL)    115 H  ()  mg/dL


 


Random Glucose   114 H   ()  mg/dL


 


Calcium   8.8   (8.4-10.5)  mg/dL


 


Phosphorus   3.1   (2.5-4.5)  mg/dL


 


Magnesium   2.4 H   (1.7-2.2)  mg/dL


 


Total Bilirubin   0.6   (0.2-1.3)  mg/dL


 


AST   40 H   (14-36)  U/L


 


ALT   31   (7-56)  U/L


 


Alkaline Phosphatase   51   ()  U/L


 


Total Protein   6.5   (5.8-8.3)  g/dL


 


Albumin   3.4   (3.0-4.8)  g/dL


 


Globulin   3.1   gm/dL


 


Albumin/Globulin Ratio   1.1   (1.1-1.8)  


 


Triglycerides     ()  mg/dL


 


Cholesterol     (130-200)  mg/dL


 


LDL Cholesterol Direct     (0-129)  mg/dL


 


HDL Cholesterol     (29-60)  mg/dL


 


Urine Color     (YELLOW)  


 


Urine Appearance     (CLEAR)  


 


Urine pH     (4.7-8.0)  


 


Ur Specific Gravity     (1.005-1.035)  


 


Urine Protein     (<30 mg/dL)  mg/dL


 


Urine Glucose (UA)     (NEGATIVE)  mg/dL


 


Urine Ketones     (NEGATIVE)  mg/dL


 


Urine Blood     (NEGATIVE)  


 


Urine Nitrate     (NEGATIVE)  


 


Urine Bilirubin     (NEGATIVE)  


 


Urine Urobilinogen     (<1 E.U./dL)  E.U./dL


 


Ur Leukocyte Esterase     (NEGATIVE)  Alaina/uL


 


Urine RBC     (0-2)  /hpf


 


Urine WBC     (0-6)  /hpf


 


Ur Epithelial Cells     (0-5)  /hpf


 


Influenza Typ A,B (EIA)     (NEGATIVE)  














  10/07/18 10/07/18 10/07/18 Range/Units





  23:49 18:04 16:00 


 


WBC     (4.5-11.0)  10^3/ul


 


RBC     (3.5-6.1)  10^6/uL


 


Hgb     (12.0-16.0)  g/dL


 


Hct     (36.0-48.0)  %


 


MCV     (80.0-105.0)  fl


 


MCH     (25.0-35.0)  pg


 


MCHC     (31.0-37.0)  g/dl


 


RDW     (11.5-14.5)  %


 


Plt Count     (120.0-450.0)  10^3/uL


 


MPV     (7.0-11.0)  fl


 


Gran %     (50.0-68.0)  %


 


Lymph % (Auto)     (22.0-35.0)  %


 


Mono % (Auto)     (1.0-6.0)  %


 


Eos % (Auto)     (1.5-5.0)  %


 


Baso % (Auto)     (0.0-3.0)  %


 


Gran #     (1.4-6.5)  


 


Lymph # (Auto)     (1.2-3.4)  


 


Mono # (Auto)     (0.1-0.6)  


 


Eos # (Auto)     (0.0-0.7)  


 


Baso # (Auto)     (0.0-2.0)  K/mm3


 


Sodium   140   (132-148)  mmol/L


 


Potassium   3.6   (3.6-5.0)  mmol/L


 


Chloride   104   ()  mmol/L


 


Carbon Dioxide   30   (21-33)  mmol/L


 


Anion Gap   10   (10-20)  


 


BUN   24 H   (7-21)  mg/dL


 


Creatinine   1.1   (0.7-1.2)  mg/dl


 


Est GFR (African Amer)   > 60   


 


Est GFR (Non-Af Amer)   51   


 


POC Glucose (mg/dL)  121 H   111 H  ()  mg/dL


 


Random Glucose   122 H   ()  mg/dL


 


Calcium   8.5   (8.4-10.5)  mg/dL


 


Phosphorus     (2.5-4.5)  mg/dL


 


Magnesium     (1.7-2.2)  mg/dL


 


Total Bilirubin     (0.2-1.3)  mg/dL


 


AST     (14-36)  U/L


 


ALT     (7-56)  U/L


 


Alkaline Phosphatase     ()  U/L


 


Total Protein     (5.8-8.3)  g/dL


 


Albumin     (3.0-4.8)  g/dL


 


Globulin     gm/dL


 


Albumin/Globulin Ratio     (1.1-1.8)  


 


Triglycerides   96   ()  mg/dL


 


Cholesterol   130   (130-200)  mg/dL


 


LDL Cholesterol Direct   62   (0-129)  mg/dL


 


HDL Cholesterol   41   (29-60)  mg/dL


 


Urine Color     (YELLOW)  


 


Urine Appearance     (CLEAR)  


 


Urine pH     (4.7-8.0)  


 


Ur Specific Gravity     (1.005-1.035)  


 


Urine Protein     (<30 mg/dL)  mg/dL


 


Urine Glucose (UA)     (NEGATIVE)  mg/dL


 


Urine Ketones     (NEGATIVE)  mg/dL


 


Urine Blood     (NEGATIVE)  


 


Urine Nitrate     (NEGATIVE)  


 


Urine Bilirubin     (NEGATIVE)  


 


Urine Urobilinogen     (<1 E.U./dL)  E.U./dL


 


Ur Leukocyte Esterase     (NEGATIVE)  Alaina/uL


 


Urine RBC     (0-2)  /hpf


 


Urine WBC     (0-6)  /hpf


 


Ur Epithelial Cells     (0-5)  /hpf


 


Influenza Typ A,B (EIA)     (NEGATIVE)  














  10/07/18 10/07/18 10/07/18 Range/Units





  13:46 13:46 11:57 


 


WBC     (4.5-11.0)  10^3/ul


 


RBC     (3.5-6.1)  10^6/uL


 


Hgb     (12.0-16.0)  g/dL


 


Hct     (36.0-48.0)  %


 


MCV     (80.0-105.0)  fl


 


MCH     (25.0-35.0)  pg


 


MCHC     (31.0-37.0)  g/dl


 


RDW     (11.5-14.5)  %


 


Plt Count     (120.0-450.0)  10^3/uL


 


MPV     (7.0-11.0)  fl


 


Gran %     (50.0-68.0)  %


 


Lymph % (Auto)     (22.0-35.0)  %


 


Mono % (Auto)     (1.0-6.0)  %


 


Eos % (Auto)     (1.5-5.0)  %


 


Baso % (Auto)     (0.0-3.0)  %


 


Gran #     (1.4-6.5)  


 


Lymph # (Auto)     (1.2-3.4)  


 


Mono # (Auto)     (0.1-0.6)  


 


Eos # (Auto)     (0.0-0.7)  


 


Baso # (Auto)     (0.0-2.0)  K/mm3


 


Sodium     (132-148)  mmol/L


 


Potassium     (3.6-5.0)  mmol/L


 


Chloride     ()  mmol/L


 


Carbon Dioxide     (21-33)  mmol/L


 


Anion Gap     (10-20)  


 


BUN     (7-21)  mg/dL


 


Creatinine     (0.7-1.2)  mg/dl


 


Est GFR (African Amer)     


 


Est GFR (Non-Af Amer)     


 


POC Glucose (mg/dL)    132 H  ()  mg/dL


 


Random Glucose     ()  mg/dL


 


Calcium     (8.4-10.5)  mg/dL


 


Phosphorus     (2.5-4.5)  mg/dL


 


Magnesium     (1.7-2.2)  mg/dL


 


Total Bilirubin     (0.2-1.3)  mg/dL


 


AST     (14-36)  U/L


 


ALT     (7-56)  U/L


 


Alkaline Phosphatase     ()  U/L


 


Total Protein     (5.8-8.3)  g/dL


 


Albumin     (3.0-4.8)  g/dL


 


Globulin     gm/dL


 


Albumin/Globulin Ratio     (1.1-1.8)  


 


Triglycerides     ()  mg/dL


 


Cholesterol     (130-200)  mg/dL


 


LDL Cholesterol Direct     (0-129)  mg/dL


 


HDL Cholesterol     (29-60)  mg/dL


 


Urine Color  Yellow    (YELLOW)  


 


Urine Appearance  Clear    (CLEAR)  


 


Urine pH  6.0    (4.7-8.0)  


 


Ur Specific Gravity  1.010    (1.005-1.035)  


 


Urine Protein  Negative    (<30 mg/dL)  mg/dL


 


Urine Glucose (UA)  Negative    (NEGATIVE)  mg/dL


 


Urine Ketones  Negative    (NEGATIVE)  mg/dL


 


Urine Blood  Moderate H    (NEGATIVE)  


 


Urine Nitrate  Negative    (NEGATIVE)  


 


Urine Bilirubin  Negative    (NEGATIVE)  


 


Urine Urobilinogen  0.2    (<1 E.U./dL)  E.U./dL


 


Ur Leukocyte Esterase  Negative    (NEGATIVE)  Alaina/uL


 


Urine RBC  5 - 10    (0-2)  /hpf


 


Urine WBC  0 - 2    (0-6)  /hpf


 


Ur Epithelial Cells  0 - 2    (0-5)  /hpf


 


Influenza Typ A,B (EIA)   Negative for flu a/b   (NEGATIVE)  








                         Laboratory Results - last 24 hr











  10/07/18 10/07/18 10/07/18





  11:57 13:46 13:46


 


WBC   


 


RBC   


 


Hgb   


 


Hct   


 


MCV   


 


MCH   


 


MCHC   


 


RDW   


 


Plt Count   


 


MPV   


 


Gran %   


 


Lymph % (Auto)   


 


Mono % (Auto)   


 


Eos % (Auto)   


 


Baso % (Auto)   


 


Gran #   


 


Lymph # (Auto)   


 


Mono # (Auto)   


 


Eos # (Auto)   


 


Baso # (Auto)   


 


Sodium   


 


Potassium   


 


Chloride   


 


Carbon Dioxide   


 


Anion Gap   


 


BUN   


 


Creatinine   


 


Est GFR ( Amer)   


 


Est GFR (Non-Af Amer)   


 


POC Glucose (mg/dL)  132 H  


 


Random Glucose   


 


Calcium   


 


Phosphorus   


 


Magnesium   


 


Total Bilirubin   


 


AST   


 


ALT   


 


Alkaline Phosphatase   


 


Total Protein   


 


Albumin   


 


Globulin   


 


Albumin/Globulin Ratio   


 


Triglycerides   


 


Cholesterol   


 


LDL Cholesterol Direct   


 


HDL Cholesterol   


 


Urine Color    Yellow


 


Urine Appearance    Clear


 


Urine pH    6.0


 


Ur Specific Gravity    1.010


 


Urine Protein    Negative


 


Urine Glucose (UA)    Negative


 


Urine Ketones    Negative


 


Urine Blood    Moderate H


 


Urine Nitrate    Negative


 


Urine Bilirubin    Negative


 


Urine Urobilinogen    0.2


 


Ur Leukocyte Esterase    Negative


 


Urine RBC    5 - 10


 


Urine WBC    0 - 2


 


Ur Epithelial Cells    0 - 2


 


Influenza Typ A,B (EIA)   Negative for flu a/b 














  10/07/18 10/07/18 10/07/18





  16:00 18:04 23:49


 


WBC   


 


RBC   


 


Hgb   


 


Hct   


 


MCV   


 


MCH   


 


MCHC   


 


RDW   


 


Plt Count   


 


MPV   


 


Gran %   


 


Lymph % (Auto)   


 


Mono % (Auto)   


 


Eos % (Auto)   


 


Baso % (Auto)   


 


Gran #   


 


Lymph # (Auto)   


 


Mono # (Auto)   


 


Eos # (Auto)   


 


Baso # (Auto)   


 


Sodium   140 


 


Potassium   3.6 


 


Chloride   104 


 


Carbon Dioxide   30 


 


Anion Gap   10 


 


BUN   24 H 


 


Creatinine   1.1 


 


Est GFR ( Amer)   > 60 


 


Est GFR (Non-Af Amer)   51 


 


POC Glucose (mg/dL)  111 H   121 H


 


Random Glucose   122 H 


 


Calcium   8.5 


 


Phosphorus   


 


Magnesium   


 


Total Bilirubin   


 


AST   


 


ALT   


 


Alkaline Phosphatase   


 


Total Protein   


 


Albumin   


 


Globulin   


 


Albumin/Globulin Ratio   


 


Triglycerides   96 


 


Cholesterol   130 


 


LDL Cholesterol Direct   62 


 


HDL Cholesterol   41 


 


Urine Color   


 


Urine Appearance   


 


Urine pH   


 


Ur Specific Gravity   


 


Urine Protein   


 


Urine Glucose (UA)   


 


Urine Ketones   


 


Urine Blood   


 


Urine Nitrate   


 


Urine Bilirubin   


 


Urine Urobilinogen   


 


Ur Leukocyte Esterase   


 


Urine RBC   


 


Urine WBC   


 


Ur Epithelial Cells   


 


Influenza Typ A,B (EIA)   














  10/08/18 10/08/18 10/08/18





  06:06 07:15 07:15


 


WBC    11.0


 


RBC    4.51


 


Hgb    13.4


 


Hct    41.4


 


MCV    91.8


 


MCH    29.7


 


MCHC    32.4


 


RDW    15.1 H


 


Plt Count    189


 


MPV    11.9 H


 


Gran %    76.1 H


 


Lymph % (Auto)    11.7 L


 


Mono % (Auto)    12.0 H


 


Eos % (Auto)    0.1 L


 


Baso % (Auto)    0.1


 


Gran #    8.34 H


 


Lymph # (Auto)    1.3


 


Mono # (Auto)    1.3 H


 


Eos # (Auto)    0.0


 


Baso # (Auto)    0.01


 


Sodium   142 


 


Potassium   4.2 


 


Chloride   109 H 


 


Carbon Dioxide   27 


 


Anion Gap   10 


 


BUN   26 H 


 


Creatinine   1.0 


 


Est GFR ( Amer)   > 60 


 


Est GFR (Non-Af Amer)   57 


 


POC Glucose (mg/dL)  115 H  


 


Random Glucose   114 H 


 


Calcium   8.8 


 


Phosphorus   3.1 


 


Magnesium   2.4 H 


 


Total Bilirubin   0.6 


 


AST   40 H 


 


ALT   31 


 


Alkaline Phosphatase   51 


 


Total Protein   6.5 


 


Albumin   3.4 


 


Globulin   3.1 


 


Albumin/Globulin Ratio   1.1 


 


Triglycerides   


 


Cholesterol   


 


LDL Cholesterol Direct   


 


HDL Cholesterol   


 


Urine Color   


 


Urine Appearance   


 


Urine pH   


 


Ur Specific Gravity   


 


Urine Protein   


 


Urine Glucose (UA)   


 


Urine Ketones   


 


Urine Blood   


 


Urine Nitrate   


 


Urine Bilirubin   


 


Urine Urobilinogen   


 


Ur Leukocyte Esterase   


 


Urine RBC   


 


Urine WBC   


 


Ur Epithelial Cells   


 


Influenza Typ A,B (EIA)   











Fingerstick Blood Sugar Results: 115





Review of Systems





- Review of Systems


Systems not reviewed;Unavailable: Intubated





Critical Care Progress Note





- Ventilator Checklist


Head of Bed 30 Degrees: Yes


Daily Sedation Vacation: Yes


Daily Assessment of Readiness to Wean: Yes


Daily Spontaneous Breathing Trial: Yes


PUD Prophalyxis: Yes


DVT Prophylaxis: Yes





- Vent Settings


MODE:: PRVC


TIDAL VOLUME:: 400





Assessment/Plan





- Assessment and Plan (Free Text)


Assessment: 





57 yo F with PMH of DM2, HTN, COPD, and chronic AFib presented in [facility] via EMS 

with ROSC achieved after 2 minutes of CPR and one dose of epinephrine. She is 

s/p cooling protocol. Per neurology, EEG activity is consistent with severe 

anoxic brain injury with poor prognosis. 





Plan: 








Neuro:


-Patient is currently non-responsive to sternal rub, trapezius pinch


-No gag, pupillary, corneal, or doll's eyes reflexes


-EEG is consistent with severe anoxic brain injury


-Poor prognosis


-Repeat CT head without acute findings


-Keppra 1500 mg BID for seizure ppx


-Cerebral flow study showed activity only in the brainstem, not cerebral cortex 

which may explain why she has some brainstem reflexes


-Repeat EEG pending per neurology recs


-F/u further neurology recs





Cardio:


-Cardiac arrest likely 2/2 arrhythmia, hx of AFib


-Currently RRR, normotensive, no longer requiring pressure support


-PCI shortly after arrival in ED showed no significant stenosis, no stents were 

placed


-LVEF found to be between 15-20% on cath


-Amiodarone, coreg, and dobutamine per cardiology recs


-F/u cardiology recs





Pulm:


-Intubated and sedated with versed alone


-CTA b/l


-CXR improved with less edema


-Vent Settings:  RR 15 PEEP 15 O2 50


-Protective lung ventilation strategy


-Keep head of bed elevated to 30 degrees


-Aspiration pxns


-Continue daily ABG, CXR





GI:


-NPO on vent


-NG in place, no suction needed, may use for meds


-Consider tube feedings if needed





/Nephro:


-BUN/Cr stable


-UOP > 2 L yesterday


-Lasix changed to 40 mg daily


-Continue to monitor


-Maintain euvolemia





Endocrine:


-Maintain euglycemia


-Patient has hx of DM2


-ISS as needed





ID:


-Leukocytosis down trending


-On empiric cefepime and flagyl 


-ID following, recs appreciated





Heme/Onc:


-H/H stable at 13.4/41.4


-No signs of HD compromise 


-Continue monitoring H/H 





DVT/GI PPX: Protonix and SC heparin


Full Code


Monitor in MICU





Case and plan reviewed and discussed with my attending Dr. Jimenez Neely, DO


[facility] Resident PGY-1

## 2018-10-08 NOTE — PN
DATE:  10/08/2018



REASON FOR THE CONSULTATION AND FOLLOWUP:  Status post code STEMI, status

post collapse, ventricular arrest, status post intubated, rule out anoxic

encephalopathy, status post cardiac catheterization, nonobstructive

coronary artery disease, nonischemic cardiomyopathy.



SUBJECTIVE:  The patient remains on the vent. Today, nurse mentioned that

the patient had some pharyngeal gag reflex on suction.



PHYSICAL EXAMINATION:

VITAL SIGNS:  Temperature 99, heart rate 87, blood pressure 142/90.

HEENT:  PERRLA.  Extraocular muscles intact.

NECK:  Supple.  No carotid bruit.  No thyromegaly.

CHEST:  Clear to auscultation.

HEART:  S1 and S2 regular.

ABDOMEN:  Soft.

EXTREMITIES:  Clubbing and cyanosis negative.



LABORATORY DATA:  Blood workup as follows:  WBC 11, hemoglobin 13.4,

hematocrit 41.4, platelet count of 189.  Chemistry shows sodium 142,

potassium 4.2, chloride 109, carbon dioxide 27, anion gap of 10, BUN 26,

creatinine 1.



IMPRESSION:  A 58-year-old female with past medical history significant for

cardiomyopathy, admitted after having collapse, ventricular tachycardia,

ventricular fibrillation arrest, status post cardiopulmonary resuscitation,

status post emergent cardiac catheterization done for code ST-elevation

myocardial infarction.  Found to be nonobstructive coronary artery disease,

limited only to mid circumflex, 60% stenosis, severely decreased left

ventricular function.  The patient is still on the vent.  Neurologically,

initially the patient was not responding, but today, they said that the

patient has some gag reflex.



RECOMMENDATION:  Continue amiodarone p.o.  Continue vent management. 

Continue aspirin.  Continue spironolactone.  Continue Coreg.  The patient

is on dobutamine.  Continue Lasix.  Dobutamine for increased pump activity

because the patient has severely decreased LV function.  Continue ACE

inhibitor, heart failure therapy.  Overall, the patient's condition is

critical.  Prognosis is extremely guarded.  Rule out anoxic encephalopathy

further as neuro workup and brain flow, follow up the brain flow study.  We

will follow with you.



Thank you, Dr. Prajapati, for providing us opportunity in taking care of the

patient, Sunshine Rivera.







__________________________________________

Booker Peck MD



DD:  10/08/2018 10:19:50

DT:  10/08/2018 10:48:21

[facility] # [id number]

## 2018-10-08 NOTE — NM
Date of service: 



10/07/2018



PROCEDURE:  Cerebral blood flow



HISTORY:

non-responsive, likely anoxic brain injury



COMPARISON:

CT 10/07/2018



TECHNIQUE:

24.2 mCi of technetium 99 M pertechnetate were injected and 

sequential imaging was performed



FINDINGS:

There is no significant blood flow seen to the brain.  Normal 

activity in the salivary glands and nasal region can be seen.  

Clinical correlation is recommended



The report concurs with the preliminary [facility] report



IMPRESSION:

Findings compatible with anoxic and brain death.

## 2018-10-09 VITALS
TEMPERATURE: 99.9 F | OXYGEN SATURATION: 76 % | SYSTOLIC BLOOD PRESSURE: 80 MMHG | DIASTOLIC BLOOD PRESSURE: 39 MMHG | RESPIRATION RATE: 17 BRPM | HEART RATE: 55 BPM

## 2018-10-09 LAB
ALBUMIN SERPL-MCNC: 3.9 G/DL (ref 3–4.8)
ALBUMIN/GLOB SERPL: 1.1 {RATIO} (ref 1.1–1.8)
ALT SERPL-CCNC: 32 U/L (ref 7–56)
AST SERPL-CCNC: 54 U/L (ref 14–36)
BUN SERPL-MCNC: 37 MG/DL (ref 7–21)
CALCIUM SERPL-MCNC: 9.3 MG/DL (ref 8.4–10.5)
GFR NON-AFRICAN AMERICAN: 29

## 2018-10-09 RX ADMIN — CEFEPIME SCH MLS/HR: 1 INJECTION, SOLUTION INTRAVENOUS at 05:39

## 2018-10-09 RX ADMIN — METRONIDAZOLE SCH MLS/HR: 500 INJECTION, SOLUTION INTRAVENOUS at 05:38

## 2018-10-09 RX ADMIN — PROPOFOL PRN MLS/HR: 10 INJECTION, EMULSION INTRAVENOUS at 05:36

## 2018-10-09 NOTE — CP.PCM.DIS
Provider





- Provider


Date of Admission: 


10/04/18 10:59





Attending physician: 


Booker Prajapati MD





Time Spent in preparation of Discharge (in minutes): 30





Diagnosis





- Discharge Diagnosis


(1) Anoxic brain damage


Status: Acute   Priority: High   





(2) Congestive heart failure


Status: Chronic   Priority: High   





(3) Diabetes


Status: Chronic   Priority: Medium   





(4) COPD (chronic obstructive pulmonary disease)


Status: Chronic   Priority: Medium   





Hospital Course





- Lab Results


Lab Results: 


                                  Micro Results





10/04/18 12:45   Blood-Venous   Blood Culture - Final


                            NO GROWTH AFTER 5 DAYS


10/04/18 12:45   Blood-Venous   Gram Stain - Final


                            TEST NOT PERFORMED


10/04/18 12:33   Blood-Venous   Blood Culture - Final


                            NO GROWTH AFTER 5 DAYS


10/04/18 12:33   Blood-Venous   Gram Stain - Final


                            TEST NOT PERFORMED


10/07/18 19:00   Sputum Induced   Gram Stain - Final


10/07/18 13:46   Urine,Catheterized   Urine Culture - Final


                            No Growth (<1,000 CFU/ML)


10/04/18 23:00   Trachasp   Gram Stain - Final


10/04/18 23:00   Trachasp   Sputum Culture - Final


                            NORMAL ORAL KRISTY


10/04/18 23:06   Nose   MRSA Culture (Admit) - Final


                            MRSA NOT DETECTED


10/05/18 12:50   Urine,Catheterized   Urine Culture - Final


                            No Growth (<1,000 CFU/ML)





                             Most Recent Lab Values











WBC  11.0 10^3/ul (4.5-11.0)   10/08/18  07:15    


 


RBC  4.51 10^6/uL (3.5-6.1)   10/08/18  07:15    


 


Hgb  13.4 g/dL (12.0-16.0)   10/08/18  07:15    


 


Hct  41.4 % (36.0-48.0)   10/08/18  07:15    


 


MCV  91.8 fl (80.0-105.0)   10/08/18  07:15    


 


MCH  29.7 pg (25.0-35.0)   10/08/18  07:15    


 


MCHC  32.4 g/dl (31.0-37.0)   10/08/18  07:15    


 


RDW  15.1 % (11.5-14.5)  H  10/08/18  07:15    


 


Plt Count  189 10^3/uL (120.0-450.0)   10/08/18  07:15    


 


MPV  11.9 fl (7.0-11.0)  H  10/08/18  07:15    


 


Gran %  76.1 % (50.0-68.0)  H  10/08/18  07:15    


 


Lymph % (Auto)  11.7 % (22.0-35.0)  L  10/08/18  07:15    


 


Mono % (Auto)  12.0 % (1.0-6.0)  H  10/08/18  07:15    


 


Eos % (Auto)  0.1 % (1.5-5.0)  L  10/08/18  07:15    


 


Baso % (Auto)  0.1 % (0.0-3.0)   10/08/18  07:15    


 


Gran #  8.34  (1.4-6.5)  H  10/08/18  07:15    


 


Lymph # (Auto)  1.3  (1.2-3.4)   10/08/18  07:15    


 


Mono # (Auto)  1.3  (0.1-0.6)  H  10/08/18  07:15    


 


Eos # (Auto)  0.0  (0.0-0.7)   10/08/18  07:15    


 


Baso # (Auto)  0.01 K/mm3 (0.0-2.0)   10/08/18  07:15    


 


Neutrophils % (Manual)  89 % (50.0-70.0)  H  10/05/18  05:30    


 


Band Neutrophils %  3 % (0-2)  H  10/05/18  05:30    


 


Lymphocytes % (Manual)  7 % (22.0-35.0)  L  10/05/18  05:30    


 


Monocytes % (Manual)  1 % (1.0-6.0)   10/05/18  05:30    


 


Platelet Evaluation  Normal  (NORMAL)   10/05/18  05:30    


 


PT  12.7 SECONDS (9.4-12.5)  H  10/04/18  09:15    


 


INR  1.10   10/04/18  09:15    


 


APTT  27.3 Seconds (25.1-36.5)   10/04/18  09:15    


 


pCO2  39 mm/Hg (35-45)   10/07/18  05:00    


 


pO2  88.0 mm/Hg ()   10/07/18  05:00    


 


HCO3  27.1 mmol/L (21-28)   10/07/18  05:00    


 


ABG pH  7.45  (7.35-7.45)   10/07/18  05:00    


 


ABG Total CO2  28.3 mmol.L (22-28)  H  10/07/18  05:00    


 


ABG O2 Saturation  99.2 % (95-98)  H  10/07/18  05:00    


 


ABG O2 Content  17.9 ML/dl (15-23)   10/07/18  05:00    


 


ABG Base Excess  3.0 mmol/L (-2.0-3.0)   10/07/18  05:00    


 


ABG Hemoglobin  13.1 g/dL (11.7-17.4)   10/07/18  05:00    


 


ABG Carboxyhemoglobin  1.8 % (0.5-1.5)  H  10/07/18  05:00    


 


POC ABG HHb (Measured)  0.8 % (0-5)   10/07/18  05:00    


 


ABG Methemoglobin  0.6 % (0.0-3.0)   10/07/18  05:00    


 


ABG O2 Capacity  18.0 mL/dl (16-24)   10/07/18  05:00    


 


ABG Potassium  3.3 mmol/L (3.6-5.2)  L  10/04/18  16:15    


 


VBG pH  7.39  (7.32-7.43)   10/04/18  16:50    


 


VBG pCO2  47.0  (40-60)   10/04/18  16:50    


 


VBG HCO3  28.5 mmol/l (21-28)  H  10/04/18  16:50    


 


VBG Total CO2  29.9 mmol.L (22-28)  H  10/04/18  16:50    


 


VBG O2 Sat (Calc)  89.4 % (40-65)  H  10/04/18  16:50    


 


VBG Base Excess  2.8 mmol/L (0.0-2.0)  H  10/04/18  16:50    


 


VBG Potassium  3.6 mmol/L (3.6-5.2)   10/04/18  16:50    


 


Hgb O2 Saturation  96.8 % (95.0-98.0)   10/07/18  05:00    


 


Sodium  138.0 mmol/L (132-148)   10/04/18  16:50    


 


Chloride  101.0 mmol/L ()   10/04/18  16:50    


 


Glucose  136 mg/dl ()  H  10/04/18  16:50    


 


Lactate  1.6 mmol/L (0.7-2.1)   10/04/18  16:50    


 


Mechanical Rate  30   10/04/18  16:15    


 


FiO2  60.0 %  10/07/18  05:00    


 


Tidal Volume  400   10/04/18  16:15    


 


PEEP  10   10/04/18  16:15    


 


Sodium  148 mmol/L (132-148)   10/09/18  05:40    


 


Potassium  5.2 mmol/L (3.6-5.0)  H  10/09/18  05:40    


 


Chloride  108 mmol/L ()  H  10/09/18  05:40    


 


Carbon Dioxide  32 mmol/L (21-33)   10/09/18  05:40    


 


Anion Gap  14  (10-20)   10/09/18  05:40    


 


BUN  37 mg/dL (7-21)  H  10/09/18  05:40    


 


Creatinine  1.8 mg/dl (0.7-1.2)  H  10/09/18  05:40    


 


Est GFR ( Amer)  35   10/09/18  05:40    


 


Est GFR (Non-Af Amer)  29   10/09/18  05:40    


 


POC Glucose (mg/dL)  141 mg/dL ()  H  10/08/18  22:11    


 


Random Glucose  143 mg/dL ()  H  10/09/18  05:40    


 


Hemoglobin A1c  6.5 % (4.2-6.5)   10/05/18  05:30    


 


Calcium  9.3 mg/dL (8.4-10.5)   10/09/18  05:40    


 


Phosphorus  3.1 mg/dL (2.5-4.5)   10/08/18  07:15    


 


Magnesium  2.4 mg/dL (1.7-2.2)  H  10/08/18  07:15    


 


Total Bilirubin  0.5 mg/dL (0.2-1.3)   10/09/18  05:40    


 


AST  54 U/L (14-36)  H D 10/09/18  05:40    


 


ALT  32 U/L (7-56)   10/09/18  05:40    


 


Alkaline Phosphatase  54 U/L ()   10/09/18  05:40    


 


Troponin I  0.08 ng/mL D 10/04/18  16:50    


 


NT-Pro-B Natriuret Pep  5280 pg/mL (0-450)  H  10/04/18  09:15    


 


Total Protein  7.5 g/dL (5.8-8.3)   10/09/18  05:40    


 


Albumin  3.9 g/dL (3.0-4.8)   10/09/18  05:40    


 


Globulin  3.6 gm/dL  10/09/18  05:40    


 


Albumin/Globulin Ratio  1.1  (1.1-1.8)   10/09/18  05:40    


 


Triglycerides  96 mg/dL ()   10/07/18  18:04    


 


Cholesterol  130 mg/dL (130-200)   10/07/18  18:04    


 


LDL Cholesterol Direct  62 mg/dL (0-129)   10/07/18  18:04    


 


HDL Cholesterol  41 mg/dL (29-60)   10/07/18  18:04    


 


Lipase  89 U/L ()   10/05/18  10:45    


 


Procalcitonin  0.22 NG/ML (0.19-0.49)   10/04/18  16:50    


 


Arterial Blood Potassium  3.3 mmol/L (3.6-5.2)  L  10/04/18  16:15    


 


Venous Blood Potassium  3.6 mmol/L (3.6-5.2)   10/04/18  16:50    


 


Urine Color  Yellow  (YELLOW)   10/07/18  13:46    


 


Urine Appearance  Clear  (CLEAR)   10/07/18  13:46    


 


Urine pH  6.0  (4.7-8.0)   10/07/18  13:46    


 


Ur Specific Gravity  1.010  (1.005-1.035)   10/07/18  13:46    


 


Urine Protein  Negative mg/dL (<30 mg/dL)   10/07/18  13:46    


 


Urine Glucose (UA)  Negative mg/dL (NEGATIVE)   10/07/18  13:46    


 


Urine Ketones  Negative mg/dL (NEGATIVE)   10/07/18  13:46    


 


Urine Blood  Moderate  (NEGATIVE)  H  10/07/18  13:46    


 


Urine Nitrate  Negative  (NEGATIVE)   10/07/18  13:46    


 


Urine Bilirubin  Negative  (NEGATIVE)   10/07/18  13:46    


 


Urine Urobilinogen  0.2 E.U./dL (<1 E.U./dL)   10/07/18  13:46    


 


Ur Leukocyte Esterase  Negative Alaina/uL (NEGATIVE)   10/07/18  13:46    


 


Urine RBC  5 - 10 /hpf (0-2)   10/07/18  13:46    


 


Urine WBC  0 - 2 /hpf (0-6)   10/07/18  13:46    


 


Ur Epithelial Cells  0 - 2 /hpf (0-5)   10/07/18  13:46    


 


Amorphous Sediment  Few   10/04/18  11:21    


 


Urine Bacteria  Many  (NEG)   10/04/18  11:21    


 


Hyaline Casts  0 - 2 /hpf  10/04/18  11:21    


 


Fine Granular Casts  0 - 2 /hpf (0-2)   10/04/18  11:21    


 


Coarse Granular Casts  Trace /hpf (0-2)  H  10/04/18  11:21    


 


Urine Other  Uyeast   10/04/18  11:21    


 


Digoxin  < 0.4 ng/mL (0.8-2.0)  L  10/04/18  16:50    


 


HIV 1&2 Ag/Ab, 4th Gen  Nonreactive  (Nonreactive)   10/05/18  05:30    


 


Influenza Typ A,B (EIA)  Negative for flu a/b  (NEGATIVE)   10/07/18  13:46    


 


Ur L.pneumophila Ag  Negative  (NEGATIVE)   10/05/18  12:50    


 


Ur Strep pneumoniae Ag  Not detected  (Not Detected)   10/04/18  13:00    














- Hospital Course


Hospital Course: 





Pt is a 57 yo female with PMH of systolic CHF, HTN, DM, COPD, AFib who presents 

to hospital s/p cardiac arrest. Patient was sedated/intubated. Patient was at 

home when she experienced chest pain and called EMS. By the time of arrival of 

EMS patient was noted to be pulseless and diaphoretic. Patient was found to have

PEA. Patient was given 4 aspirins and CPR was started. ROSC was obtained after 2

minutes of chest compressions and patient was intubated in the field. No 

epinephrine was obtained. Pt was found to have had an anoxic brain injury 

secondary to cardiac arrest. Head CT showed acute cerebral abnormalities as well

as aspiration pneumonia. The decision was made to terminally extubate the pt 

last night while in the ICU. Pt subsequently  this morning. 





- Date & Time of H&P


Date of H&P: 10/09/18


Time of H&P: 13:41





Discharge Exam





- Head Exam


Head Exam: NORMOCEPHALIC





Discharge Plan





- Follow Up Plan


Condition: GOOD


Disposition: HOME/ ROUTINE

## 2018-10-09 NOTE — CP.PCM.PRO
Pronouncement of Death Note





- Clinical Findings


Physical Exam: No Response Verbal/Painful Stimuli, Absent Heart & Breath Sounds,

No Pupillary Light Reflex, No Corneal Reflex, Pupils Fixed & Dilated, Absence of

Vital Signs





- Pronouncement Time


Time of Pronouncement of Death: 08:00





- Notifications


Pronouncement Notifications: Family Notified, Atending Notified





- [place]


[place]ELLEED[place] Number: [id number]

## 2018-10-10 NOTE — PCM.EEG
Electroencephalogram Report





- Electroencephalogram Report


Procedure Date: 10/09/18


Interpretation: Indication; Coma.  Technical Information: This was a  16-channel

EEG, 1-channel EKG , performed using an [employer] machine., electrodes were applied 

according to the 10/20 international placement system, impedances were less than

5 K Ohm.  There was no awake architecture seen, the EEG showed  ongoing 1 to 1.5

Hz,  generalized epileptifornm paroxysmal discharges (GPEDs) in a periodic 

fashion. , however clear ictal activity was not seen.  No awake/sleep 

architecture.  No HV or Photic performed.


Impression: This was an abnormal routine   EEG,  due to the presence of:  1-Se

rusty  background slowing/attenuation .  2-GPEds (periodic generalzied interictal

epileptifrom discharges).  INTERPRETATION:  This  findings are  in keeping with 

a  sever  non specific diffuse    disturbance of cortical activity. This is in 

keeping with a diffuse gray matter dysfunction. The findings do not suggest a 

specific etiology.  Findings, given the history,  are consistent with myoclonic 

status epilepticus.